# Patient Record
Sex: FEMALE | Race: WHITE | NOT HISPANIC OR LATINO | Employment: FULL TIME | URBAN - METROPOLITAN AREA
[De-identification: names, ages, dates, MRNs, and addresses within clinical notes are randomized per-mention and may not be internally consistent; named-entity substitution may affect disease eponyms.]

---

## 2017-01-25 ENCOUNTER — OFFICE VISIT (OUTPATIENT)
Dept: INTERNAL MEDICINE | Facility: CLINIC | Age: 57
End: 2017-01-25
Payer: COMMERCIAL

## 2017-01-25 VITALS
SYSTOLIC BLOOD PRESSURE: 158 MMHG | DIASTOLIC BLOOD PRESSURE: 86 MMHG | HEIGHT: 64 IN | WEIGHT: 187.19 LBS | HEART RATE: 73 BPM | BODY MASS INDEX: 31.96 KG/M2

## 2017-01-25 DIAGNOSIS — G47.00 INSOMNIA, UNSPECIFIED TYPE: Primary | ICD-10-CM

## 2017-01-25 DIAGNOSIS — F41.9 ANXIETY AND DEPRESSION: ICD-10-CM

## 2017-01-25 DIAGNOSIS — F32.A ANXIETY AND DEPRESSION: ICD-10-CM

## 2017-01-25 PROCEDURE — 99999 PR PBB SHADOW E&M-EST. PATIENT-LVL III: CPT | Mod: PBBFAC,,, | Performed by: INTERNAL MEDICINE

## 2017-01-25 PROCEDURE — 99213 OFFICE O/P EST LOW 20 MIN: CPT | Mod: S$GLB,,, | Performed by: INTERNAL MEDICINE

## 2017-01-25 PROCEDURE — 3079F DIAST BP 80-89 MM HG: CPT | Mod: S$GLB,,, | Performed by: INTERNAL MEDICINE

## 2017-01-25 PROCEDURE — 3077F SYST BP >= 140 MM HG: CPT | Mod: S$GLB,,, | Performed by: INTERNAL MEDICINE

## 2017-01-25 PROCEDURE — 1159F MED LIST DOCD IN RCRD: CPT | Mod: S$GLB,,, | Performed by: INTERNAL MEDICINE

## 2017-01-25 RX ORDER — LORAZEPAM 1 MG/1
1 TABLET ORAL NIGHTLY
Qty: 30 TABLET | Refills: 1 | Status: SHIPPED | OUTPATIENT
Start: 2017-01-25 | End: 2017-03-24 | Stop reason: SDUPTHER

## 2017-01-25 NOTE — PROGRESS NOTES
Subjective:       Patient ID: Marielena Tracy is a 56 y.o. female.    Chief Complaint: Anxiety (discuss anxiety medication)    HPI Comments: Patient here to discuss anxiety and insomnia.  56-year-old female with history of intermittent anxiety and depression who states she has had increasingly difficulty sleeping in part she believes because she has anxiety and her mind is racing at the time of trying to go to bed.  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  She says she was told that she is not personally liable for anything but she said the company assigned her an  and she says that has her anxious.  At this point she said the Ambien 5 mg is not helping with sleep.  She was looking for options.  While I do not want her on medication long-term with an important work case coming up I think it is reasonable to try a benzodiazepine medication.  We discussed risks benefits and side effects and she is willing to try it in lieu of Ambien.     Anxiety   Symptoms include nervous/anxious behavior. Patient reports no chest pain, shortness of breath or suicidal ideas.         Review of Systems   Constitutional: Negative for appetite change, chills and fever.   HENT: Negative for sore throat.    Respiratory: Negative for cough, shortness of breath and wheezing.    Cardiovascular: Negative for chest pain and leg swelling.   Gastrointestinal: Negative for abdominal pain, constipation and diarrhea.   Genitourinary: Negative for difficulty urinating.   Musculoskeletal: Negative for neck pain and neck stiffness.   Skin: Negative for rash.   Psychiatric/Behavioral: Positive for dysphoric mood and sleep disturbance. Negative for self-injury and suicidal ideas. The patient is nervous/anxious.        Objective:      Physical Exam   Constitutional: She is oriented to person, place, and time. She appears well-developed and well-nourished. No distress.   HENT:    Head: Normocephalic and atraumatic.   Mouth/Throat: No oropharyngeal exudate.   Eyes: No scleral icterus.   Neck: Normal range of motion. Neck supple.   Cardiovascular: Normal rate and regular rhythm.    No murmur heard.  Pulmonary/Chest: Effort normal and breath sounds normal. She has no wheezes.   Musculoskeletal: She exhibits no tenderness.   Neurological: She is alert and oriented to person, place, and time.   Skin: No rash noted.   Psychiatric:   Mood is somewhat anxious.  She is somewhat fidgety during the exam.  Minimally anxious about this upcoming trip and court case.  No suicidal homicidal ideation       Assessment:       1. Insomnia, unspecified type    2. Anxiety and depression        Plan:       Marielena was seen today for anxiety.    Diagnoses and all orders for this visit:    Insomnia, unspecified type    Anxiety and depression    Other orders  -     lorazepam (ATIVAN) 1 MG tablet; Take 1 tablet (1 mg total) by mouth every evening.        patient will update me in a few days as to her progress of getting more restful sleep.  Depending on how she is doing after this trip for work we will consider tapering and stopping the meds or seeing a sleep specialist

## 2017-01-25 NOTE — MR AVS SNAPSHOT
[facility]  [address]  [place] [address]  Phone: [phone]  Fax: [phone]                  Marielena Tracy   2017 1:15 PM   Office Visit    Description:  Female : 1960   Provider:  Delroy Gregory MD   Department:  [facility] Medicine           Reason for Visit     Anxiety           Diagnoses this Visit        Comments    Insomnia, unspecified type    -  Primary     Anxiety and depression                To Do List           Goals (5 Years of Data)     None       These Medications        Disp Refills Start End    lorazepam (ATIVAN) 1 MG tablet 30 tablet 1 2017     Take 1 tablet (1 mg total) by mouth every evening. - Oral    Pharmacy: [facility]/pharmacy #[id number] - [place], [place] - [address]  #: [phone]         [facility]     [facility] Nurse Care [facility] -  Assistance  Registered nurses in the [facility] provide clinical advisement, health education, appointment booking, and other advisory services.  Call for this free service at [phone].             Medications           Message regarding Medications     Verify the changes and/or additions to your medication regime listed below are the same as discussed with your clinician today.  If any of these changes or additions are incorrect, please notify your healthcare provider.        START taking these NEW medications        Refills    lorazepam (ATIVAN) 1 MG tablet 1    Sig: Take 1 tablet (1 mg total) by mouth every evening.    Class: Normal    Route: Oral      STOP taking these medications     zolpidem (AMBIEN) 5 MG Tab TAKE 1 TABLET (5 MG TOTAL) BY MOUTH EVERY EVENING.           Verify that the below list of medications is an accurate representation of the medications you are currently taking.  If none reported, the list may be blank. If incorrect, please contact your healthcare provider. Carry this list with you in case of emergency.          "  Current Medications     aspirin (ECOTRIN) 81 MG EC tablet Take 81 mg by mouth once daily.    CALCIUM CARBONATE/VITAMIN D3 (VITAMIN D-3 ORAL) Take 1,000 mg by mouth once daily.    citalopram (CELEXA) 20 MG tablet Take 1 tablet (20 mg total) by mouth once daily.    clobetasol 0.05% (TEMOVATE) 0.05 % Oint AAA bid    fish oil-omega-3 fatty acids 300-1,000 mg capsule Take 2 g by mouth once daily.    fluocinonide (LIDEX) 0.05 % external solution AAA scalp qd - bid prn pruritus    simvastatin (ZOCOR) 40 MG tablet Take 1 tablet (40 mg total) by mouth once daily.    triamcinolone acetonide 0.1% (KENALOG) 0.1 % ointment AAA bid    urea 50 % Crea Apply 1 application topically once daily. Apply to affected area    folic acid (FOLVITE) 1 MG tablet 1 po qd - do not take on same day as taking methotrexate    latanoprost 0.005 % ophthalmic solution Place 1 drop into both eyes every evening.    lorazepam (ATIVAN) 1 MG tablet Take 1 tablet (1 mg total) by mouth every evening.    methotrexate 2.5 MG Tab TAKE 6 TABLETS (15 MG TOTAL) BY MOUTH EVERY 7 DAYS.    peg-electrolyte soln (TRILYTE WITH FLAVOR PACKETS) 420 gram SolR Take 4,000 mLs by mouth as directed.           Clinical Reference Information           Vital Signs - Last Recorded  Most recent update: 1/25/2017  1:21 PM by Sonal Teran MA    BP Pulse Ht Wt BMI    (!) 158/86 73 5' 4" (1.626 m) 84.9 kg (187 lb 3.2 oz) 32.13 kg/m2      Blood Pressure          Most Recent Value    BP  (!)  158/86      Allergies as of 1/25/2017     Sulfa (Sulfonamide Antibiotics)      Immunizations Administered on Date of Encounter - 1/25/2017     None      "

## 2017-01-30 ENCOUNTER — PATIENT MESSAGE (OUTPATIENT)
Dept: INTERNAL MEDICINE | Facility: CLINIC | Age: 57
End: 2017-01-30

## 2017-02-16 ENCOUNTER — PATIENT MESSAGE (OUTPATIENT)
Dept: DERMATOLOGY | Facility: CLINIC | Age: 57
End: 2017-02-16

## 2017-03-24 RX ORDER — LORAZEPAM 1 MG/1
TABLET ORAL
Qty: 30 TABLET | Refills: 1 | Status: SHIPPED | OUTPATIENT
Start: 2017-03-24 | End: 2017-05-26 | Stop reason: SDUPTHER

## 2017-05-03 ENCOUNTER — OFFICE VISIT (OUTPATIENT)
Dept: INTERNAL MEDICINE | Facility: CLINIC | Age: 57
End: 2017-05-03
Payer: COMMERCIAL

## 2017-05-03 VITALS
HEIGHT: 64 IN | HEART RATE: 78 BPM | DIASTOLIC BLOOD PRESSURE: 84 MMHG | SYSTOLIC BLOOD PRESSURE: 120 MMHG | BODY MASS INDEX: 32.74 KG/M2 | WEIGHT: 191.81 LBS

## 2017-05-03 DIAGNOSIS — E78.5 HYPERLIPIDEMIA, UNSPECIFIED HYPERLIPIDEMIA TYPE: ICD-10-CM

## 2017-05-03 DIAGNOSIS — F41.9 ANXIETY AND DEPRESSION: ICD-10-CM

## 2017-05-03 DIAGNOSIS — Z00.00 ROUTINE PHYSICAL EXAMINATION: Primary | ICD-10-CM

## 2017-05-03 DIAGNOSIS — Z12.31 ENCOUNTER FOR SCREENING MAMMOGRAM FOR MALIGNANT NEOPLASM OF BREAST: ICD-10-CM

## 2017-05-03 DIAGNOSIS — I10 ESSENTIAL HYPERTENSION: ICD-10-CM

## 2017-05-03 DIAGNOSIS — F32.A ANXIETY AND DEPRESSION: ICD-10-CM

## 2017-05-03 PROCEDURE — 3079F DIAST BP 80-89 MM HG: CPT | Mod: S$GLB,,, | Performed by: INTERNAL MEDICINE

## 2017-05-03 PROCEDURE — 99999 PR PBB SHADOW E&M-EST. PATIENT-LVL IV: CPT | Mod: PBBFAC,,, | Performed by: INTERNAL MEDICINE

## 2017-05-03 PROCEDURE — 99396 PREV VISIT EST AGE 40-64: CPT | Mod: S$GLB,,, | Performed by: INTERNAL MEDICINE

## 2017-05-03 PROCEDURE — 3074F SYST BP LT 130 MM HG: CPT | Mod: S$GLB,,, | Performed by: INTERNAL MEDICINE

## 2017-05-03 RX ORDER — CITALOPRAM 40 MG/1
40 TABLET, FILM COATED ORAL DAILY
Qty: 30 TABLET | Refills: 12 | Status: SHIPPED | OUTPATIENT
Start: 2017-05-03 | End: 2017-07-14 | Stop reason: SDUPTHER

## 2017-05-03 NOTE — MR AVS SNAPSHOT
[facility]  [address]  [place] [address]  Phone: [phone]  Fax: [phone]                  Marielena Tracy   5/3/2017 3:00 PM   Office Visit    Description:  Female : 1960   Provider:  Delroy Gregory MD   Department:  [facility] - Internal Medicine           Reason for Visit     Annual Exam           Diagnoses this Visit        Comments    Routine physical examination    -  Primary     Hyperlipidemia, unspecified hyperlipidemia type         Essential hypertension         Anxiety and depression         Encounter for screening mammogram for malignant neoplasm of breast                To Do List           Goals (5 Years of Data)     None       These Medications        Disp Refills Start End    citalopram (CELEXA) 40 MG tablet 30 tablet 12 5/3/2017     Take 1 tablet (40 mg total) by mouth once daily. - Oral    Pharmacy: [facility]/pharmacy #[id number] - [place], [place] - [address].  #: [phone]         [facility]     [facility] Nurse Care Line -  Assistance  Unless otherwise directed by your provider, please contact [facility], our nurse care line that is available for  assistance.     Registered nurses in the [facility] Center provide: appointment scheduling, clinical advisement, health education, and other advisory services.  Call: [phone] (toll free)               Medications           Message regarding Medications     Verify the changes and/or additions to your medication regime listed below are the same as discussed with your clinician today.  If any of these changes or additions are incorrect, please notify your healthcare provider.        CHANGE how you are taking these medications     Start Taking Instead of    citalopram (CELEXA) 40 MG tablet citalopram (CELEXA) 20 MG tablet    Dosage:  Take 1 tablet (40 mg total) by mouth once daily. Dosage:  Take 1 tablet (20 mg total) by mouth once daily.    Reason for  "Change:  Reorder       STOP taking these medications     peg-electrolyte soln (TRILYTE WITH FLAVOR PACKETS) 420 gram SolR Take 4,000 mLs by mouth as directed.           Verify that the below list of medications is an accurate representation of the medications you are currently taking.  If none reported, the list may be blank. If incorrect, please contact your healthcare provider. Carry this list with you in case of emergency.           Current Medications     aspirin (ECOTRIN) 81 MG EC tablet Take 81 mg by mouth once daily.    CALCIUM CARBONATE/VITAMIN D3 (VITAMIN D-3 ORAL) Take 1,000 mg by mouth once daily.    citalopram (CELEXA) 40 MG tablet Take 1 tablet (40 mg total) by mouth once daily.    clobetasol 0.05% (TEMOVATE) 0.05 % Oint AAA bid    fish oil-omega-3 fatty acids 300-1,000 mg capsule Take 2 g by mouth once daily.    fluocinonide (LIDEX) 0.05 % external solution AAA scalp qd - bid prn pruritus    folic acid (FOLVITE) 1 MG tablet 1 po qd - do not take on same day as taking methotrexate    latanoprost 0.005 % ophthalmic solution Place 1 drop into both eyes every evening.    lorazepam (ATIVAN) 1 MG tablet TAKE 1 TABLET (1 MG TOTAL) BY MOUTH EVERY EVENING.    methotrexate 2.5 MG Tab TAKE 6 TABLETS (15 MG TOTAL) BY MOUTH EVERY 7 DAYS.    simvastatin (ZOCOR) 40 MG tablet Take 1 tablet (40 mg total) by mouth once daily.    triamcinolone acetonide 0.1% (KENALOG) 0.1 % ointment AAA bid    urea 50 % Crea Apply 1 application topically once daily. Apply to affected area           Clinical Reference Information           Your Vitals Were     BP Pulse Height Weight BMI    120/84 78 5' 4" (1.626 m) 87 kg (191 lb 12.8 oz) 32.92 kg/m2      Blood Pressure          Most Recent Value    BP  120/84      Allergies as of 5/3/2017     Sulfa (Sulfonamide Antibiotics)      Immunizations Administered on Date of Encounter - 5/3/2017     None      Orders Placed During Today's Visit     Future Labs/Procedures Expected by Expires    CBC " auto differential  5/3/2017 5/3/2018    Comprehensive metabolic panel  5/3/2017 5/3/2018    Lipid panel  5/3/2017 5/3/2018    Mammo Digital Screening Bilat with CAD  5/3/2017 7/4/2018    TSH  5/3/2017 5/3/2018    Vitamin D  5/3/2017 (Approximate) 8/1/2017      Language Assistance Services     ATTENTION: Language assistance services are available, free of charge. Please call [phone].      ATENCIÓN: Si habla darrynañol, tiene a purcell disposición servicios gratuitos de asistencia lingüística. Llame al [phone].     CHÚ Ý: N?u b?n nói Ti?ng Vi?t, có các d?ch v? h? tr? ngôn ng? mi?n phí dành cho b?n. G?i s? [phone].         [facility] complies with applicable Federal civil rights laws and does not discriminate on the basis of race, color, national origin, age, disability, or sex.
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
## 2017-05-19 ENCOUNTER — HOSPITAL ENCOUNTER (OUTPATIENT)
Dept: RADIOLOGY | Facility: HOSPITAL | Age: 57
Discharge: HOME | End: 2017-05-19
Attending: INTERNAL MEDICINE
Payer: COMMERCIAL

## 2017-05-19 DIAGNOSIS — Z12.31 ENCOUNTER FOR SCREENING MAMMOGRAM FOR MALIGNANT NEOPLASM OF BREAST: ICD-10-CM

## 2017-05-19 PROCEDURE — 77067 SCR MAMMO BI INCL CAD: CPT | Mod: 26,,, | Performed by: RADIOLOGY

## 2017-05-19 PROCEDURE — 77067 SCR MAMMO BI INCL CAD: CPT | Mod: TC

## 2017-05-24 ENCOUNTER — PATIENT MESSAGE (OUTPATIENT)
Dept: INTERNAL MEDICINE | Facility: CLINIC | Age: 57
End: 2017-05-24

## 2017-05-26 ENCOUNTER — PATIENT MESSAGE (OUTPATIENT)
Dept: INTERNAL MEDICINE | Facility: CLINIC | Age: 57
End: 2017-05-26

## 2017-05-26 RX ORDER — LORAZEPAM 1 MG/1
TABLET ORAL
Qty: 30 TABLET | Refills: 1 | Status: SHIPPED | OUTPATIENT
Start: 2017-05-26 | End: 2017-07-24 | Stop reason: SDUPTHER

## 2017-06-05 RX ORDER — SIMVASTATIN 40 MG/1
40 TABLET, FILM COATED ORAL DAILY
Qty: 90 TABLET | Refills: 1 | Status: SHIPPED | OUTPATIENT
Start: 2017-06-05 | End: 2017-12-02 | Stop reason: SDUPTHER

## 2017-07-14 RX ORDER — CITALOPRAM 40 MG/1
40 TABLET, FILM COATED ORAL DAILY
Qty: 90 TABLET | Refills: 3 | Status: SHIPPED | OUTPATIENT
Start: 2017-07-14 | End: 2018-05-28 | Stop reason: SDUPTHER

## 2017-07-24 RX ORDER — LORAZEPAM 1 MG/1
TABLET ORAL
Qty: 30 TABLET | Refills: 1 | Status: SHIPPED | OUTPATIENT
Start: 2017-07-24 | End: 2017-09-20 | Stop reason: SDUPTHER

## 2017-08-07 ENCOUNTER — PATIENT MESSAGE (OUTPATIENT)
Dept: INTERNAL MEDICINE | Facility: CLINIC | Age: 57
End: 2017-08-07

## 2017-09-20 RX ORDER — LORAZEPAM 1 MG/1
TABLET ORAL
Qty: 30 TABLET | Refills: 1 | Status: SHIPPED | OUTPATIENT
Start: 2017-09-20 | End: 2017-11-20 | Stop reason: SDUPTHER

## 2017-11-20 RX ORDER — LORAZEPAM 1 MG/1
TABLET ORAL
Qty: 30 TABLET | Refills: 1 | Status: SHIPPED | OUTPATIENT
Start: 2017-11-20 | End: 2018-01-20 | Stop reason: SDUPTHER

## 2017-12-03 RX ORDER — SIMVASTATIN 40 MG/1
40 TABLET, FILM COATED ORAL DAILY
Qty: 90 TABLET | Refills: 1 | Status: SHIPPED | OUTPATIENT
Start: 2017-12-03 | End: 2018-05-27 | Stop reason: SDUPTHER

## 2018-01-20 RX ORDER — LORAZEPAM 1 MG/1
TABLET ORAL
Qty: 30 TABLET | Refills: 1 | Status: SHIPPED | OUTPATIENT
Start: 2018-01-20 | End: 2018-03-21 | Stop reason: SDUPTHER

## 2018-03-22 RX ORDER — LORAZEPAM 1 MG/1
TABLET ORAL
Qty: 30 TABLET | Refills: 0 | Status: SHIPPED | OUTPATIENT
Start: 2018-03-22 | End: 2018-04-19 | Stop reason: SDUPTHER

## 2018-04-19 RX ORDER — LORAZEPAM 1 MG/1
TABLET ORAL
Qty: 30 TABLET | Refills: 0 | Status: SHIPPED | OUTPATIENT
Start: 2018-04-19 | End: 2018-05-28 | Stop reason: SDUPTHER

## 2018-05-16 ENCOUNTER — PATIENT MESSAGE (OUTPATIENT)
Dept: INTERNAL MEDICINE | Facility: CLINIC | Age: 58
End: 2018-05-16

## 2018-05-16 RX ORDER — LORAZEPAM 1 MG/1
TABLET ORAL
Qty: 30 TABLET | Refills: 0 | OUTPATIENT
Start: 2018-05-16

## 2018-05-16 NOTE — TELEPHONE ENCOUNTER
Refill of requested medication(s) is not appropriate. It has been a year since she has been seen.  Please assist patient with making an appointment.

## 2018-05-27 RX ORDER — SIMVASTATIN 40 MG/1
40 TABLET, FILM COATED ORAL DAILY
Qty: 90 TABLET | Refills: 1 | Status: SHIPPED | OUTPATIENT
Start: 2018-05-27 | End: 2018-05-28 | Stop reason: SDUPTHER

## 2018-05-28 ENCOUNTER — PATIENT MESSAGE (OUTPATIENT)
Dept: INTERNAL MEDICINE | Facility: CLINIC | Age: 58
End: 2018-05-28

## 2018-05-28 ENCOUNTER — HOSPITAL ENCOUNTER (OUTPATIENT)
Dept: RADIOLOGY | Facility: HOSPITAL | Age: 58
Discharge: HOME | End: 2018-05-28
Attending: INTERNAL MEDICINE
Payer: COMMERCIAL

## 2018-05-28 ENCOUNTER — OFFICE VISIT (OUTPATIENT)
Dept: INTERNAL MEDICINE | Facility: CLINIC | Age: 58
End: 2018-05-28
Payer: COMMERCIAL

## 2018-05-28 VITALS
OXYGEN SATURATION: 96 % | DIASTOLIC BLOOD PRESSURE: 71 MMHG | BODY MASS INDEX: 32.9 KG/M2 | HEIGHT: 64 IN | HEART RATE: 85 BPM | WEIGHT: 192.69 LBS | SYSTOLIC BLOOD PRESSURE: 128 MMHG

## 2018-05-28 VITALS — HEIGHT: 64 IN | WEIGHT: 192 LBS | BODY MASS INDEX: 32.78 KG/M2

## 2018-05-28 DIAGNOSIS — Z12.31 ENCOUNTER FOR SCREENING MAMMOGRAM FOR MALIGNANT NEOPLASM OF BREAST: ICD-10-CM

## 2018-05-28 DIAGNOSIS — F41.9 ANXIETY AND DEPRESSION: ICD-10-CM

## 2018-05-28 DIAGNOSIS — I10 ESSENTIAL HYPERTENSION: ICD-10-CM

## 2018-05-28 DIAGNOSIS — Z00.00 ROUTINE PHYSICAL EXAMINATION: Primary | ICD-10-CM

## 2018-05-28 DIAGNOSIS — E78.5 HYPERLIPIDEMIA, UNSPECIFIED HYPERLIPIDEMIA TYPE: ICD-10-CM

## 2018-05-28 DIAGNOSIS — H40.9 GLAUCOMA, UNSPECIFIED GLAUCOMA TYPE, UNSPECIFIED LATERALITY: ICD-10-CM

## 2018-05-28 DIAGNOSIS — F32.A ANXIETY AND DEPRESSION: ICD-10-CM

## 2018-05-28 PROCEDURE — 77067 SCR MAMMO BI INCL CAD: CPT | Mod: 26,,, | Performed by: RADIOLOGY

## 2018-05-28 PROCEDURE — 77067 SCR MAMMO BI INCL CAD: CPT | Mod: TC

## 2018-05-28 PROCEDURE — 99999 PR PBB SHADOW E&M-EST. PATIENT-LVL III: CPT | Mod: PBBFAC,,, | Performed by: INTERNAL MEDICINE

## 2018-05-28 PROCEDURE — 99396 PREV VISIT EST AGE 40-64: CPT | Mod: S$GLB,,, | Performed by: INTERNAL MEDICINE

## 2018-05-28 PROCEDURE — 3078F DIAST BP <80 MM HG: CPT | Mod: CPTII,S$GLB,, | Performed by: INTERNAL MEDICINE

## 2018-05-28 PROCEDURE — 3074F SYST BP LT 130 MM HG: CPT | Mod: CPTII,S$GLB,, | Performed by: INTERNAL MEDICINE

## 2018-05-28 RX ORDER — LORAZEPAM 1 MG/1
TABLET ORAL
Qty: 30 TABLET | Refills: 5 | Status: SHIPPED | OUTPATIENT
Start: 2018-05-28 | End: 2018-11-13 | Stop reason: SDUPTHER

## 2018-05-28 RX ORDER — SIMVASTATIN 40 MG/1
40 TABLET, FILM COATED ORAL DAILY
Qty: 90 TABLET | Refills: 4 | Status: SHIPPED | OUTPATIENT
Start: 2018-05-28 | End: 2018-11-15 | Stop reason: SDUPTHER

## 2018-05-28 RX ORDER — CITALOPRAM 40 MG/1
40 TABLET, FILM COATED ORAL DAILY
Qty: 90 TABLET | Refills: 4 | Status: SHIPPED | OUTPATIENT
Start: 2018-05-28 | End: 2019-05-10 | Stop reason: SDUPTHER

## 2018-05-28 NOTE — PROGRESS NOTES
Subjective:       Patient ID: Marielena Tracy is a 57 y.o. female.    Chief Complaint: Medication Refill    HPI:  Patient here exam.  She is here follow-up dyslipidemia and borderline D. She make a vision check follow-up and gyn.  She is up-to-date with other screening.  We will assist labs and mammogram we discussed habit-forming nature benzodiazepine medications.  She understanding has not escalated the dose.  She states skin symptoms seemed to stem from anxiety and when well controlled those are under control.  She denies any changes in family history.      Medication Refill   Associated symptoms include a rash. Pertinent negatives include no abdominal pain, arthralgias, chest pain, chills, coughing, fatigue, fever, headaches, joint swelling, nausea, neck pain, sore throat, vomiting or weakness.     Review of Systems   Constitutional: Negative for activity change, chills, fatigue, fever and unexpected weight change.   HENT: Negative for hearing loss, nosebleeds, rhinorrhea, sinus pain, sore throat and trouble swallowing.    Eyes: Negative for pain, discharge and visual disturbance.   Respiratory: Negative for apnea, cough, chest tightness, shortness of breath and wheezing.    Cardiovascular: Negative for chest pain, palpitations and leg swelling.   Gastrointestinal: Negative for abdominal pain, blood in stool, constipation, diarrhea, nausea and vomiting.   Endocrine: Negative for polydipsia and polyuria.   Genitourinary: Negative for difficulty urinating, dysuria, frequency, hematuria and menstrual problem.   Musculoskeletal: Negative for arthralgias, joint swelling, neck pain and neck stiffness.   Skin: Positive for rash. Negative for wound.   Neurological: Negative for dizziness, weakness and headaches.   Hematological: Negative for adenopathy.   Psychiatric/Behavioral: Negative for confusion and dysphoric mood. The patient is nervous/anxious.        Objective:      Physical Exam   Constitutional: She is  oriented to person, place, and time. She appears well-developed and well-nourished.   Overweight female     HENT:   Head: Normocephalic and atraumatic.   Right Ear: Tympanic membrane, external ear and ear canal normal.   Left Ear: Tympanic membrane, external ear and ear canal normal.   Nose: Nose normal.   Mouth/Throat: Oropharynx is clear and moist and mucous membranes are normal. No oropharyngeal exudate.   Eyes: Conjunctivae and EOM are normal. Pupils are equal, round, and reactive to light. Right eye exhibits no discharge. Left eye exhibits no discharge.   Neck: Normal range of motion. Neck supple. No JVD present. No thyromegaly present.   Cardiovascular: Normal rate, regular rhythm, normal heart sounds and intact distal pulses.    No murmur heard.  Pulmonary/Chest: Effort normal and breath sounds normal. No respiratory distress. She has no wheezes. She has no rales.   Abdominal: Soft. Bowel sounds are normal. She exhibits no mass. There is no tenderness.   Musculoskeletal: Normal range of motion. She exhibits no edema or tenderness.   Lymphadenopathy:     She has no cervical adenopathy.        Right: No supraclavicular adenopathy present.        Left: No supraclavicular adenopathy present.   Neurological: She is alert and oriented to person, place, and time. She has normal reflexes. No cranial nerve deficit.   Skin: No rash noted.   Psychiatric: She has a normal mood and affect. She does not exhibit a depressed mood.   Vitals reviewed.      Assessment:       1. Routine physical examination    2. Essential hypertension    3. Hyperlipidemia, unspecified hyperlipidemia type    4. Glaucoma, unspecified glaucoma type, unspecified laterality    5. Encounter for screening mammogram for malignant neoplasm of breast    6. Anxiety and depression        Plan:       Marielena was seen today for medication refill.    Diagnoses and all orders for this visit:    Routine physical examination  -     Lipid panel; Future  -      Comprehensive metabolic panel; Future  -     Vitamin D; Future    Essential hypertension  -     Lipid panel; Future  -     Comprehensive metabolic panel; Future  -     Vitamin D; Future    Hyperlipidemia, unspecified hyperlipidemia type  -     Lipid panel; Future  -     Comprehensive metabolic panel; Future  -     Vitamin D; Future    Glaucoma, unspecified glaucoma type, unspecified laterality  -     Lipid panel; Future  -     Comprehensive metabolic panel; Future  -     Vitamin D; Future    Encounter for screening mammogram for malignant neoplasm of breast  -     Mammo Digital Screening Bilat with CAD; Future    Anxiety and depression    Other orders  -     LORazepam (ATIVAN) 1 MG tablet; TAKE 1 TABLET (1 MG TOTAL) BY MOUTH EVERY EVENING.  -     citalopram (CELEXA) 40 MG tablet; Take 1 tablet (40 mg total) by mouth once daily.  -     simvastatin (ZOCOR) 40 MG tablet; Take 1 tablet (40 mg total) by mouth once daily.

## 2018-08-06 ENCOUNTER — PATIENT MESSAGE (OUTPATIENT)
Dept: INTERNAL MEDICINE | Facility: CLINIC | Age: 58
End: 2018-08-06

## 2018-08-14 ENCOUNTER — TELEPHONE (OUTPATIENT)
Dept: OBSTETRICS AND GYNECOLOGY | Facility: CLINIC | Age: 58
End: 2018-08-14

## 2018-08-14 ENCOUNTER — OFFICE VISIT (OUTPATIENT)
Dept: OBSTETRICS AND GYNECOLOGY | Facility: CLINIC | Age: 58
End: 2018-08-14
Payer: COMMERCIAL

## 2018-08-14 VITALS
BODY MASS INDEX: 32.52 KG/M2 | WEIGHT: 190.5 LBS | SYSTOLIC BLOOD PRESSURE: 126 MMHG | DIASTOLIC BLOOD PRESSURE: 80 MMHG | HEIGHT: 64 IN

## 2018-08-14 DIAGNOSIS — N95.2 VAGINAL ATROPHY: ICD-10-CM

## 2018-08-14 DIAGNOSIS — Z78.0 POSTMENOPAUSE: ICD-10-CM

## 2018-08-14 DIAGNOSIS — N94.10 DYSPAREUNIA IN FEMALE: ICD-10-CM

## 2018-08-14 DIAGNOSIS — Z01.419 WELL WOMAN EXAM WITH ROUTINE GYNECOLOGICAL EXAM: Primary | ICD-10-CM

## 2018-08-14 PROCEDURE — 99386 PREV VISIT NEW AGE 40-64: CPT | Mod: S$GLB,,, | Performed by: NURSE PRACTITIONER

## 2018-08-14 PROCEDURE — 99999 PR PBB SHADOW E&M-EST. PATIENT-LVL III: CPT | Mod: PBBFAC,,, | Performed by: NURSE PRACTITIONER

## 2018-08-14 PROCEDURE — 88175 CYTOPATH C/V AUTO FLUID REDO: CPT

## 2018-08-14 PROCEDURE — 3074F SYST BP LT 130 MM HG: CPT | Mod: CPTII,S$GLB,, | Performed by: NURSE PRACTITIONER

## 2018-08-14 PROCEDURE — 3079F DIAST BP 80-89 MM HG: CPT | Mod: CPTII,S$GLB,, | Performed by: NURSE PRACTITIONER

## 2018-08-29 ENCOUNTER — OFFICE VISIT (OUTPATIENT)
Dept: OPTOMETRY | Facility: CLINIC | Age: 58
End: 2018-08-29
Payer: COMMERCIAL

## 2018-08-29 DIAGNOSIS — H52.4 PRESBYOPIA OF BOTH EYES: ICD-10-CM

## 2018-08-29 DIAGNOSIS — H52.223 REGULAR ASTIGMATISM OF BOTH EYES: ICD-10-CM

## 2018-08-29 DIAGNOSIS — H40.003 GLAUCOMA SUSPECT OF BOTH EYES: Primary | ICD-10-CM

## 2018-08-29 PROCEDURE — 92004 COMPRE OPH EXAM NEW PT 1/>: CPT | Mod: S$GLB,,, | Performed by: OPTOMETRIST

## 2018-08-29 PROCEDURE — 99999 PR PBB SHADOW E&M-EST. PATIENT-LVL III: CPT | Mod: PBBFAC,,, | Performed by: OPTOMETRIST

## 2018-08-29 PROCEDURE — 92015 DETERMINE REFRACTIVE STATE: CPT | Mod: S$GLB,,, | Performed by: OPTOMETRIST

## 2018-08-29 NOTE — PATIENT INSTRUCTIONS
Prior diagnosis of low tension glaucoma in the right eye and low-tension glaucoma suspect in the left eye.  No evidence overtly glaucomatous visual field defect in either eye, per repeat visual field test done in 2015.   Question prior diagnosis of low tension glaucoma in the right eye.    Ms. Tracy was using eyedrops for IOP control in each eye, but discontinued use approximately two years ago.  IOP today well within normal range in each eye today (15 mm Hg) in each eye, without the use of drops for IOP control/reduction.  C/D ratio greater than average in each eye, but appears stablish in each eye.    Remain off drops for IOP control, at least temporarily.    Assistant to call to schedule repeat Johansen Visual Field (HVF) 24-2 LULÚ Standard or OCT retinal nerve fiber layer (RNFL) thickness analysis, and follow-up appointment with me (Dr. Milligan) on the same date.      Simple hyperopic astigmatism in each eye, with satisfactory best-corrected VA in each eye.  Presbyopia consistent with age.   New spectacle lens Rx issued for use as desired.     Repeat refraction in 12 - 18 months.

## 2018-08-29 NOTE — PROGRESS NOTES
HPI     Concerns About Ocular Health      Additional comments: General eye examination and refraction.  No longer using drops for IOP control.  Had been using Lumigan drops in   both eyes every evening, but has stopped using two years ago.               Comments     DLS:  2/16/15    Pt here for check of ocular health.  Pt states a slight decrease in VA OU   at near.  Pt states she needs a new Rx for eyeglasses.     Patient's age: 57 y.o.  Occupation: Clerical work  Approximate date of last eye examination:  2/16/15  Name of last eye doctor seen: Dr Milligan  City/State: [place]  Wears glasses? yes     If yes, wears  Full-time or part-time?  Full time  Present glasses are: Bifocal, SV Distance, SV Reading?  bifocal  Approximate age of present glasses:  3 years   Got new glasses following last exam, or subsequently?:  Following last   exam   Any problem with VA with glasses?  Yes  Wears CLs?:  no                  Headaches?  no  Eye pain/discomfort?  no                                                                                     Flashes?  no  Floaters?  no  Diplopia/Double vision?  no  Patient's Ocular History:         Any eye surgeries? no         Any eye injury?  no         Any treatment for eye disease?  glaucoma  Family history of eye disease?  Yes, maternal uncle-glaucoma  Significant patient medical history:         1. Diabetes?  no       If yes, IDDM or NIDDM? n/a   2. HBP?  no              3. Other (describe):  High cholesterol   ! OTC eyedrops currently using:  OTC drops   ! Prescription eye meds currently using:  Not using anything at this   time-ran out of Lumigan   ! Any history of allergy/adverse reaction to any eye meds used   previously?  no    ! Any history of allergy/adverse reaction to eyedrops used during prior   eye exam(s)? no    ! Any history of allergy/adverse reaction to Novacaine or similar meds?   no   ! Any history of allergy/adverse reaction to Epinephrine or similar meds?  "  no    ! Patient okay with use of anesthetic eyedrops to check eye pressure?    yes        ! Patient okay with use of eyedrops to dilate pupils today?  yes   !  Allergies/Medications/Medical History/Family History reviewed today?    yes      PD =   60/57  Desired reading distance =  14"                                                                      Last edited by Robert Milligan, OD on 8/29/2018  4:02 PM. (History)            Assessment /Plan     For exam results, see Encounter Report.    1. Glaucoma suspect of both eyes  Johansen Visual Field - OU - Extended - Both Eyes    OCT, Optic Nerve - OU - Both Eyes   2. Regular astigmatism of both eyes     3. Presbyopia of both eyes                  Prior diagnosis of low tension glaucoma in the right eye and low-tension glaucoma suspect in the left eye.  No evidence overtly glaucomatous visual field defect in either eye, per repeat visual field test done in 2015.   Question prior diagnosis of low tension glaucoma in the right eye.    Ms. Tracy was using eyedrops for IOP control in each eye, but discontinued use approximately two years ago.  IOP today well within normal range in each eye today (15 mm Hg) in each eye, without the use of drops for IOP control/reduction.  C/D ratio greater than average in each eye, but appears stablish in each eye.    Remain off drops for IOP control, at least temporarily.    Assistant to call to schedule repeat Johansen Visual Field (HVF) 24-2 LULÚ Standard or OCT retinal nerve fiber layer (RNFL) thickness analysis, and follow-up appointment with me (Dr. Milligan) on the same date.    Simple hyperopic astigmatism in each eye, with satisfactory best-corrected VA in each eye.  Presbyopia consistent with age.   New spectacle lens Rx issued for use as desired.     Repeat refraction in 12 - 18 months.     "

## 2018-09-27 ENCOUNTER — OFFICE VISIT (OUTPATIENT)
Dept: OPTOMETRY | Facility: CLINIC | Age: 58
End: 2018-09-27
Payer: COMMERCIAL

## 2018-09-27 ENCOUNTER — CLINICAL SUPPORT (OUTPATIENT)
Dept: OPHTHALMOLOGY | Facility: CLINIC | Age: 58
End: 2018-09-27
Payer: COMMERCIAL

## 2018-09-27 DIAGNOSIS — H40.003 GLAUCOMA SUSPECT OF BOTH EYES: Primary | ICD-10-CM

## 2018-09-27 DIAGNOSIS — H40.003 GLAUCOMA SUSPECT OF BOTH EYES: ICD-10-CM

## 2018-09-27 PROCEDURE — 92133 CPTRZD OPH DX IMG PST SGM ON: CPT | Mod: S$GLB,,, | Performed by: OPTOMETRIST

## 2018-09-27 PROCEDURE — 92012 INTRM OPH EXAM EST PATIENT: CPT | Mod: S$GLB,,, | Performed by: OPTOMETRIST

## 2018-09-27 PROCEDURE — 92083 EXTENDED VISUAL FIELD XM: CPT | Mod: S$GLB,,, | Performed by: OPTOMETRIST

## 2018-09-27 PROCEDURE — 99999 PR PBB SHADOW E&M-EST. PATIENT-LVL III: CPT | Mod: PBBFAC,,, | Performed by: OPTOMETRIST

## 2018-09-27 NOTE — PROGRESS NOTES
"HPI     Patient in for progress check. Pt returns for glaucoma follow up. Pt   denies any noticeable changes since last visit.    Being followed for (diagnosis):       Date last seen:  08/29/18    Doctor last seen:      Prescribed eye medications(s) using:      OTC eye medication(s) using:      Signs/symptoms of condition resolved/better/stable/worse?: intermittent   mucus in the morning, no flashes.    Allergies/Medications reviewed today Sulf           Last edited by Hany Barkley MA on 9/27/2018  8:50 AM. (History)            Assessment /Plan     For exam results, see Encounter Report.    1. Glaucoma suspect of both eyes                    Prior diagnosis of low tension glaucoma in the right eye and low-tension glaucoma suspect in the left eye.  No evidence overtly glaucomatous visual field defect in either eye, per repeat visual field test done in 2015.   Question prior diagnosis of low tension glaucoma in the right eye.     Ms. Tracy was using eyedrops for IOP control in each eye, but discontinued use approximately two years ago.  IOP today remains well within normal range in each eye today (15 mm Hg in the right eye and 13 mm Hg in the left eye) without the use of drops for IOP control/reduction.  C/D ratio greater than average in each eye, but appears stablish in each eye,compared to previous descriptio/photos.    Repeat HVF test done today.  Focal defect superotemporal quadrant of the right eye, suggestive of enlarged blind spot, but no other overtly glaucomatous visual field defect noted.  Visual field of the left eye appears normal.  Discussed with Ms. Tracy.     Remain off drops for IOP control, and continue to monitor regularly - at least annually.     Ms. Tracy reports awareness of transient "film" in vision of the right eye.  Dilated fundus exam reveals presence of minimal vitreous floaters in the right eye, but no other problem noted.  Discussed and reassured.    Repeat general eye " exam in one year.

## 2018-09-27 NOTE — PATIENT INSTRUCTIONS
"Prior diagnosis of low tension glaucoma in the right eye and low-tension glaucoma suspect in the left eye.  No evidence overtly glaucomatous visual field defect in either eye, per repeat visual field test done in 2015.   Question prior diagnosis of low tension glaucoma in the right eye.     Ms. Tracy was using eyedrops for IOP control in each eye, but discontinued use approximately two years ago.  IOP today remains well within normal range in each eye today (15 mm Hg in the right eye and 13 mm Hg in the left eye) without the use of drops for IOP control/reduction.  C/D ratio greater than average in each eye, but appears stablish in each eye,compared to previous descriptio/photos.    Repeat HVF test done today.  Focal defect superotemporal quadrant of the right eye, suggestive of enlarged blind spot, but no other overtly glaucomatous visual field defect noted.  Visual field of the left eye appears normal.  Discussed with Ms. Tracy.     Remain off drops for IOP control, and continue to monitor regularly - at least annually.     Ms. Tracy reports awareness of transient "film" in vision of the right eye.  Dilated fundus exam reveals presence of minimal vitreous floaters in the right eye, but no other problem noted.  Discussed and reassured.    Repeat general eye exam in one year.   "

## 2018-11-13 ENCOUNTER — OFFICE VISIT (OUTPATIENT)
Dept: INTERNAL MEDICINE | Facility: CLINIC | Age: 58
End: 2018-11-13
Payer: COMMERCIAL

## 2018-11-13 VITALS
WEIGHT: 195.31 LBS | OXYGEN SATURATION: 98 % | HEART RATE: 70 BPM | HEIGHT: 64 IN | SYSTOLIC BLOOD PRESSURE: 138 MMHG | DIASTOLIC BLOOD PRESSURE: 84 MMHG | BODY MASS INDEX: 33.34 KG/M2

## 2018-11-13 DIAGNOSIS — I10 ESSENTIAL HYPERTENSION: ICD-10-CM

## 2018-11-13 DIAGNOSIS — E78.5 HYPERLIPIDEMIA, UNSPECIFIED HYPERLIPIDEMIA TYPE: ICD-10-CM

## 2018-11-13 DIAGNOSIS — F41.9 ANXIETY AND DEPRESSION: Primary | ICD-10-CM

## 2018-11-13 DIAGNOSIS — F32.A ANXIETY AND DEPRESSION: Primary | ICD-10-CM

## 2018-11-13 PROCEDURE — 90662 IIV NO PRSV INCREASED AG IM: CPT | Mod: S$GLB,,, | Performed by: INTERNAL MEDICINE

## 2018-11-13 PROCEDURE — 90471 IMMUNIZATION ADMIN: CPT | Mod: S$GLB,,, | Performed by: INTERNAL MEDICINE

## 2018-11-13 PROCEDURE — 3075F SYST BP GE 130 - 139MM HG: CPT | Mod: CPTII,S$GLB,, | Performed by: INTERNAL MEDICINE

## 2018-11-13 PROCEDURE — 3008F BODY MASS INDEX DOCD: CPT | Mod: CPTII,S$GLB,, | Performed by: INTERNAL MEDICINE

## 2018-11-13 PROCEDURE — 99214 OFFICE O/P EST MOD 30 MIN: CPT | Mod: 25,S$GLB,, | Performed by: INTERNAL MEDICINE

## 2018-11-13 PROCEDURE — 3079F DIAST BP 80-89 MM HG: CPT | Mod: CPTII,S$GLB,, | Performed by: INTERNAL MEDICINE

## 2018-11-13 PROCEDURE — 99999 PR PBB SHADOW E&M-EST. PATIENT-LVL IV: CPT | Mod: PBBFAC,,, | Performed by: INTERNAL MEDICINE

## 2018-11-13 RX ORDER — LORAZEPAM 1 MG/1
TABLET ORAL
Qty: 30 TABLET | Refills: 5 | Status: SHIPPED | OUTPATIENT
Start: 2018-11-13 | End: 2019-05-10 | Stop reason: SDUPTHER

## 2018-11-13 NOTE — PROGRESS NOTES
Subjective:       Patient ID: Marielena Tracy is a 58 y.o. female.    Chief Complaint: Medication Refill    HPI:  Patient comes in for interval follow-up of anxiety with medication refill lorazepam and she would like a flu shot.  Overall she is doing very well.  Mood is good.  Full of energy.  She is trying to lose weight.  She denies any active or acute complaints or problems with side effects.  NO Chest pain shortness of breath fevers or chills.      Review of Systems   Constitutional: Negative for appetite change, chills and fever.   HENT: Negative for nosebleeds, sinus pain and sore throat.    Eyes: Negative for visual disturbance.   Respiratory: Negative for cough, shortness of breath and wheezing.    Cardiovascular: Negative for chest pain and leg swelling.   Gastrointestinal: Negative for abdominal pain, constipation and diarrhea.   Genitourinary: Negative for difficulty urinating and hematuria.   Musculoskeletal: Negative for neck pain and neck stiffness.   Skin: Negative for pallor and rash.   Neurological: Negative for headaches.   Psychiatric/Behavioral: Positive for dysphoric mood ( improved) and sleep disturbance (Stable). Negative for suicidal ideas. The patient is nervous/anxious ( stable).        Objective:      Physical Exam   Constitutional: She is oriented to person, place, and time. She appears well-developed and well-nourished. No distress.   HENT:   Head: Normocephalic and atraumatic.   Mouth/Throat: Oropharynx is clear and moist. No oropharyngeal exudate.   Eyes: Conjunctivae are normal. Pupils are equal, round, and reactive to light. No scleral icterus.   Neck: Normal range of motion. Neck supple.   Cardiovascular: Normal rate and regular rhythm.   No murmur heard.  Pulmonary/Chest: Effort normal and breath sounds normal. She has no wheezes.   Abdominal: Soft. Bowel sounds are normal.   Musculoskeletal: She exhibits no tenderness.   Neurological: She is alert and oriented to person,  place, and time.   Skin: No rash noted.   Psychiatric: She has a normal mood and affect. Her behavior is normal. Thought content normal.   Vitals reviewed.      Assessment:       1. Anxiety and depression    2. Essential hypertension    3. Hyperlipidemia, unspecified hyperlipidemia type        Plan:       Marielena was seen today for medication refill.    Diagnoses and all orders for this visit:    Anxiety and depression    Essential hypertension    Hyperlipidemia, unspecified hyperlipidemia type    Other orders  -     LORazepam (ATIVAN) 1 MG tablet; TAKE 1 TABLET (1 MG TOTAL) BY MOUTH EVERY EVENING.  -     Influenza - High Dose (65+) (PF) (IM)

## 2018-11-15 RX ORDER — SIMVASTATIN 40 MG/1
40 TABLET, FILM COATED ORAL DAILY
Qty: 90 TABLET | Refills: 4 | Status: SHIPPED | OUTPATIENT
Start: 2018-11-15 | End: 2019-05-10 | Stop reason: SDUPTHER

## 2018-11-15 NOTE — TELEPHONE ENCOUNTER
----- Message from Jas Paz sent at 11/15/2018 10:19 AM CST -----  Contact: [facility]/ [place] [phone]  Is this a refill or new RX:  Refill    RX name and strength: simvastatin (ZOCOR) 40 MG tablet    Is this a 30 day or 90 day RX:  90      Pharmacy name and phone # [facility]/pharmacy #[id number] - [place] - [address] [phone] (Phone) [phone] (Fax)

## 2019-04-15 ENCOUNTER — PATIENT MESSAGE (OUTPATIENT)
Dept: INTERNAL MEDICINE | Facility: CLINIC | Age: 59
End: 2019-04-15

## 2019-04-15 DIAGNOSIS — M79.671 RIGHT FOOT PAIN: Primary | ICD-10-CM

## 2019-04-30 ENCOUNTER — HOSPITAL ENCOUNTER (OUTPATIENT)
Dept: RADIOLOGY | Facility: HOSPITAL | Age: 59
Discharge: HOME | End: 2019-04-30
Attending: PODIATRIST
Payer: COMMERCIAL

## 2019-04-30 ENCOUNTER — OFFICE VISIT (OUTPATIENT)
Dept: PODIATRY | Facility: CLINIC | Age: 59
End: 2019-04-30
Payer: COMMERCIAL

## 2019-04-30 VITALS
DIASTOLIC BLOOD PRESSURE: 88 MMHG | WEIGHT: 195 LBS | HEIGHT: 64 IN | BODY MASS INDEX: 33.29 KG/M2 | HEART RATE: 74 BPM | SYSTOLIC BLOOD PRESSURE: 150 MMHG

## 2019-04-30 DIAGNOSIS — Q66.70 CAVUS DEFORMITY: ICD-10-CM

## 2019-04-30 DIAGNOSIS — M79.671 RIGHT FOOT PAIN: Primary | ICD-10-CM

## 2019-04-30 DIAGNOSIS — M79.671 RIGHT FOOT PAIN: ICD-10-CM

## 2019-04-30 PROCEDURE — 99999 PR PBB SHADOW E&M-EST. PATIENT-LVL III: ICD-10-PCS | Mod: PBBFAC,,, | Performed by: PODIATRIST

## 2019-04-30 PROCEDURE — 99203 PR OFFICE/OUTPT VISIT, NEW, LEVL III, 30-44 MIN: ICD-10-PCS | Mod: S$GLB,,, | Performed by: PODIATRIST

## 2019-04-30 PROCEDURE — 3008F PR BODY MASS INDEX (BMI) DOCUMENTED: ICD-10-PCS | Mod: CPTII,S$GLB,, | Performed by: PODIATRIST

## 2019-04-30 PROCEDURE — 73630 XR FOOT COMPLETE 3 VIEW RIGHT: ICD-10-PCS | Mod: 26,RT,, | Performed by: RADIOLOGY

## 2019-04-30 PROCEDURE — 3079F DIAST BP 80-89 MM HG: CPT | Mod: CPTII,S$GLB,, | Performed by: PODIATRIST

## 2019-04-30 PROCEDURE — 99203 OFFICE O/P NEW LOW 30 MIN: CPT | Mod: S$GLB,,, | Performed by: PODIATRIST

## 2019-04-30 PROCEDURE — 3079F PR MOST RECENT DIASTOLIC BLOOD PRESSURE 80-89 MM HG: ICD-10-PCS | Mod: CPTII,S$GLB,, | Performed by: PODIATRIST

## 2019-04-30 PROCEDURE — 3077F SYST BP >= 140 MM HG: CPT | Mod: CPTII,S$GLB,, | Performed by: PODIATRIST

## 2019-04-30 PROCEDURE — 73630 X-RAY EXAM OF FOOT: CPT | Mod: TC,RT

## 2019-04-30 PROCEDURE — 3077F PR MOST RECENT SYSTOLIC BLOOD PRESSURE >= 140 MM HG: ICD-10-PCS | Mod: CPTII,S$GLB,, | Performed by: PODIATRIST

## 2019-04-30 PROCEDURE — 99999 PR PBB SHADOW E&M-EST. PATIENT-LVL III: CPT | Mod: PBBFAC,,, | Performed by: PODIATRIST

## 2019-04-30 PROCEDURE — 73630 X-RAY EXAM OF FOOT: CPT | Mod: 26,RT,, | Performed by: RADIOLOGY

## 2019-04-30 PROCEDURE — 3008F BODY MASS INDEX DOCD: CPT | Mod: CPTII,S$GLB,, | Performed by: PODIATRIST

## 2019-05-07 NOTE — PROGRESS NOTES
Subjective:      Patient ID: Marielena Tracy is a 58 y.o. female.    Chief Complaint: Foot Pain (right foot )    Marielena is a 58 y.o. female who presents to the podiatry clinic  with complaint of  right foot pain. Onset of the symptoms was several months ago. Precipitating event: none known. Current symptoms include: ability to bear weight, but with some pain. Aggravating factors: any weight bearing. Symptoms have progressed to a point and plateaued. Patient has had no prior foot problems. Evaluation to date: none. Treatment to date: none. Patients rates pain 5/10 on pain scale.        Review of Systems   Constitution: Negative for chills, fever and malaise/fatigue.   HENT: Negative for hearing loss.    Cardiovascular: Negative for claudication.   Respiratory: Negative for shortness of breath.    Skin: Negative for flushing and rash.   Musculoskeletal: Positive for joint pain. Negative for myalgias.   Neurological: Negative for loss of balance, numbness, paresthesias and sensory change.   Psychiatric/Behavioral: Negative for altered mental status.           Objective:      Physical Exam   Cardiovascular:   Pulses:       Dorsalis pedis pulses are 2+ on the right side, and 2+ on the left side.        Posterior tibial pulses are 2+ on the right side, and 2+ on the left side.   No edema noted to b/L LEs   Musculoskeletal:   Muscle strength is 5/5 in all groups bilaterally.  Increased height of medial arches noted b/L  POP to midfoot and dorsal MPJ, right  Hammertoes noted, digits 2-5 b/L    with adductovarus rotation of b/L fifth digit.       Feet:   Right Foot:   Protective Sensation: 5 sites tested. 5 sites sensed.   Left Foot:   Protective Sensation: 5 sites tested. 5 sites sensed.   Neurological:   Intact gross sensation noted to b/L LEs   Skin:   Normal skin tugor noted.   No open lesion noted b/L  Skin temp is warm to warm from proximal to distal b/L.  Webspaces clean, dry, and intact  Nails x10 short              Assessment:       Encounter Diagnoses   Name Primary?    Right foot pain Yes    Cavus deformity          Plan:       Marielena was seen today for foot pain.    Diagnoses and all orders for this visit:    Right foot pain  -     X-Ray Foot Complete 3 view Right; Future  -     ORTHOTIC DEVICE (DME)    Cavus deformity  -     ORTHOTIC DEVICE (DME)      I counseled the patient on her conditions, their implications and medical management.      X-rays taken and reviewed with pt, no fractures or dislocations noted.   Rx custom orthotics  Pt advised on RICE and OTC NSAIDs for associated pain.     Pt advised that her pain was likely due to her cavus foot type  Shoe modification advised for the pt. Pt was advised to obtain shoes will accommodate foot deformities.     Call or return to clinic prn if these symptoms worsen or fail to improve as anticipated.    .

## 2019-05-10 ENCOUNTER — LAB VISIT (OUTPATIENT)
Dept: LAB | Facility: HOSPITAL | Age: 59
End: 2019-05-10
Attending: INTERNAL MEDICINE
Payer: COMMERCIAL

## 2019-05-10 ENCOUNTER — OFFICE VISIT (OUTPATIENT)
Dept: INTERNAL MEDICINE | Facility: CLINIC | Age: 59
End: 2019-05-10
Payer: COMMERCIAL

## 2019-05-10 VITALS
WEIGHT: 190.5 LBS | DIASTOLIC BLOOD PRESSURE: 86 MMHG | BODY MASS INDEX: 32.7 KG/M2 | OXYGEN SATURATION: 98 % | SYSTOLIC BLOOD PRESSURE: 134 MMHG | HEART RATE: 83 BPM

## 2019-05-10 DIAGNOSIS — F41.9 ANXIETY AND DEPRESSION: ICD-10-CM

## 2019-05-10 DIAGNOSIS — E78.5 HYPERLIPIDEMIA, UNSPECIFIED HYPERLIPIDEMIA TYPE: ICD-10-CM

## 2019-05-10 DIAGNOSIS — F32.A ANXIETY AND DEPRESSION: ICD-10-CM

## 2019-05-10 DIAGNOSIS — Z00.00 ROUTINE PHYSICAL EXAMINATION: ICD-10-CM

## 2019-05-10 DIAGNOSIS — Z00.00 ROUTINE PHYSICAL EXAMINATION: Primary | ICD-10-CM

## 2019-05-10 DIAGNOSIS — I10 ESSENTIAL HYPERTENSION: ICD-10-CM

## 2019-05-10 LAB
25(OH)D3+25(OH)D2 SERPL-MCNC: 27 NG/ML (ref 30–96)
ALBUMIN SERPL BCP-MCNC: 4.3 G/DL (ref 3.5–5.2)
ALP SERPL-CCNC: 93 U/L (ref 55–135)
ALT SERPL W/O P-5'-P-CCNC: 13 U/L (ref 10–44)
ANION GAP SERPL CALC-SCNC: 7 MMOL/L (ref 8–16)
AST SERPL-CCNC: 16 U/L (ref 10–40)
BASOPHILS # BLD AUTO: 0.02 K/UL (ref 0–0.2)
BASOPHILS NFR BLD: 0.2 % (ref 0–1.9)
BILIRUB SERPL-MCNC: 0.4 MG/DL (ref 0.1–1)
BUN SERPL-MCNC: 18 MG/DL (ref 6–20)
CALCIUM SERPL-MCNC: 9.6 MG/DL (ref 8.7–10.5)
CHLORIDE SERPL-SCNC: 105 MMOL/L (ref 95–110)
CHOLEST SERPL-MCNC: 134 MG/DL (ref 120–199)
CHOLEST/HDLC SERPL: 2.6 {RATIO} (ref 2–5)
CO2 SERPL-SCNC: 31 MMOL/L (ref 23–29)
CREAT SERPL-MCNC: 0.8 MG/DL (ref 0.5–1.4)
DIFFERENTIAL METHOD: ABNORMAL
EOSINOPHIL # BLD AUTO: 0.2 K/UL (ref 0–0.5)
EOSINOPHIL NFR BLD: 2.4 % (ref 0–8)
ERYTHROCYTE [DISTWIDTH] IN BLOOD BY AUTOMATED COUNT: 13.3 % (ref 11.5–14.5)
EST. GFR  (AFRICAN AMERICAN): >60 ML/MIN/1.73 M^2
EST. GFR  (NON AFRICAN AMERICAN): >60 ML/MIN/1.73 M^2
GLUCOSE SERPL-MCNC: 85 MG/DL (ref 70–110)
HCT VFR BLD AUTO: 44.4 % (ref 37–48.5)
HDLC SERPL-MCNC: 52 MG/DL (ref 40–75)
HDLC SERPL: 38.8 % (ref 20–50)
HGB BLD-MCNC: 14.1 G/DL (ref 12–16)
LDLC SERPL CALC-MCNC: 58.4 MG/DL (ref 63–159)
LYMPHOCYTES # BLD AUTO: 2.1 K/UL (ref 1–4.8)
LYMPHOCYTES NFR BLD: 24.7 % (ref 18–48)
MCH RBC QN AUTO: 29.9 PG (ref 27–31)
MCHC RBC AUTO-ENTMCNC: 31.8 G/DL (ref 32–36)
MCV RBC AUTO: 94 FL (ref 82–98)
MONOCYTES # BLD AUTO: 0.7 K/UL (ref 0.3–1)
MONOCYTES NFR BLD: 8.6 % (ref 4–15)
NEUTROPHILS # BLD AUTO: 5.4 K/UL (ref 1.8–7.7)
NEUTROPHILS NFR BLD: 64 % (ref 38–73)
NONHDLC SERPL-MCNC: 82 MG/DL
PLATELET # BLD AUTO: 294 K/UL (ref 150–350)
PMV BLD AUTO: 11.2 FL (ref 9.2–12.9)
POTASSIUM SERPL-SCNC: 3.8 MMOL/L (ref 3.5–5.1)
PROT SERPL-MCNC: 6.8 G/DL (ref 6–8.4)
RBC # BLD AUTO: 4.71 M/UL (ref 4–5.4)
SODIUM SERPL-SCNC: 143 MMOL/L (ref 136–145)
TRIGL SERPL-MCNC: 118 MG/DL (ref 30–150)
TSH SERPL DL<=0.005 MIU/L-ACNC: 0.86 UIU/ML (ref 0.4–4)
WBC # BLD AUTO: 8.47 K/UL (ref 3.9–12.7)

## 2019-05-10 PROCEDURE — 82306 VITAMIN D 25 HYDROXY: CPT

## 2019-05-10 PROCEDURE — 99999 PR PBB SHADOW E&M-EST. PATIENT-LVL III: ICD-10-PCS | Mod: PBBFAC,,, | Performed by: INTERNAL MEDICINE

## 2019-05-10 PROCEDURE — 3075F PR MOST RECENT SYSTOLIC BLOOD PRESS GE 130-139MM HG: ICD-10-PCS | Mod: CPTII,S$GLB,, | Performed by: INTERNAL MEDICINE

## 2019-05-10 PROCEDURE — 99396 PR PREVENTIVE VISIT,EST,40-64: ICD-10-PCS | Mod: S$GLB,,, | Performed by: INTERNAL MEDICINE

## 2019-05-10 PROCEDURE — 80053 COMPREHEN METABOLIC PANEL: CPT

## 2019-05-10 PROCEDURE — 3079F PR MOST RECENT DIASTOLIC BLOOD PRESSURE 80-89 MM HG: ICD-10-PCS | Mod: CPTII,S$GLB,, | Performed by: INTERNAL MEDICINE

## 2019-05-10 PROCEDURE — 3075F SYST BP GE 130 - 139MM HG: CPT | Mod: CPTII,S$GLB,, | Performed by: INTERNAL MEDICINE

## 2019-05-10 PROCEDURE — 36415 COLL VENOUS BLD VENIPUNCTURE: CPT

## 2019-05-10 PROCEDURE — 84443 ASSAY THYROID STIM HORMONE: CPT

## 2019-05-10 PROCEDURE — 99396 PREV VISIT EST AGE 40-64: CPT | Mod: S$GLB,,, | Performed by: INTERNAL MEDICINE

## 2019-05-10 PROCEDURE — 80061 LIPID PANEL: CPT

## 2019-05-10 PROCEDURE — 85025 COMPLETE CBC W/AUTO DIFF WBC: CPT

## 2019-05-10 PROCEDURE — 3079F DIAST BP 80-89 MM HG: CPT | Mod: CPTII,S$GLB,, | Performed by: INTERNAL MEDICINE

## 2019-05-10 PROCEDURE — 99999 PR PBB SHADOW E&M-EST. PATIENT-LVL III: CPT | Mod: PBBFAC,,, | Performed by: INTERNAL MEDICINE

## 2019-05-10 RX ORDER — RIBOFLAVIN (VITAMIN B2) 100 MG
1000 TABLET ORAL 2 TIMES DAILY
COMMUNITY

## 2019-05-10 RX ORDER — CITALOPRAM 40 MG/1
40 TABLET, FILM COATED ORAL DAILY
Qty: 90 TABLET | Refills: 4 | Status: SHIPPED | OUTPATIENT
Start: 2019-05-10 | End: 2020-05-04 | Stop reason: SDUPTHER

## 2019-05-10 RX ORDER — LORAZEPAM 1 MG/1
TABLET ORAL
Qty: 30 TABLET | Refills: 5 | Status: SHIPPED | OUTPATIENT
Start: 2019-05-10 | End: 2019-11-20 | Stop reason: SDUPTHER

## 2019-05-10 RX ORDER — SIMVASTATIN 40 MG/1
40 TABLET, FILM COATED ORAL DAILY
Qty: 90 TABLET | Refills: 4 | Status: SHIPPED | OUTPATIENT
Start: 2019-05-10 | End: 2020-05-04 | Stop reason: SDUPTHER

## 2019-05-10 NOTE — PROGRESS NOTES
Subjective:       Patient ID: Marielena Tracy is a 58 y.o. female.    Chief Complaint: Medication Refill    Patient here for annual exam and refill of anxiety medicine.  She would like to get labs done.  She is tolerating anxiety medication well.  She has not escalated her does.  No side effects.  Understands habit-forming nature.  She is feeling much better about her feet now that she has inserts in place.  She recently saw Podiatry and this is going well    Review of Systems   Constitutional: Negative for activity change and unexpected weight change.   HENT: Negative for hearing loss, rhinorrhea and trouble swallowing.    Eyes: Negative for discharge and visual disturbance.   Respiratory: Negative for chest tightness and wheezing.    Cardiovascular: Negative for chest pain and palpitations.   Gastrointestinal: Negative for blood in stool, constipation, diarrhea and vomiting.   Endocrine: Negative for polydipsia and polyuria.   Genitourinary: Negative for difficulty urinating, dysuria, hematuria and menstrual problem.   Musculoskeletal: Positive for arthralgias (Feet pain improving). Negative for joint swelling and neck pain.   Neurological: Negative for weakness and headaches.   Psychiatric/Behavioral: Positive for sleep disturbance. Negative for confusion and dysphoric mood. The patient is nervous/anxious.        Objective:      Physical Exam   Constitutional: She is oriented to person, place, and time. She appears well-developed and well-nourished. No distress.   HENT:   Head: Normocephalic and atraumatic.   Right Ear: External ear normal.   Left Ear: External ear normal.   Mouth/Throat: Oropharynx is clear and moist. No oropharyngeal exudate.   Eyes: Pupils are equal, round, and reactive to light. Conjunctivae are normal. No scleral icterus.   Neck: Normal range of motion. Neck supple. No thyromegaly present.   Cardiovascular: Normal rate and regular rhythm.   No murmur heard.  Pulmonary/Chest: Effort  normal and breath sounds normal. She has no wheezes.   Abdominal: Soft. Bowel sounds are normal. She exhibits no distension. There is no tenderness.   Musculoskeletal: She exhibits tenderness (Bilateral arches improving).   Lymphadenopathy:     She has no cervical adenopathy.   Neurological: She is alert and oriented to person, place, and time.   Skin: No rash noted.   Psychiatric: She has a normal mood and affect.       Assessment:       1. Routine physical examination    2. Essential hypertension    3. Hyperlipidemia, unspecified hyperlipidemia type    4. Anxiety and depression        Plan:       Marielena was seen today for medication refill.    Diagnoses and all orders for this visit:    Routine physical examination  -     Vitamin D; Future  -     TSH; Future  -     Lipid panel; Future  -     Comprehensive metabolic panel; Future  -     CBC auto differential; Future    Essential hypertension    Hyperlipidemia, unspecified hyperlipidemia type    Anxiety and depression    Other orders  -     LORazepam (ATIVAN) 1 MG tablet; TAKE 1 TABLET (1 MG TOTAL) BY MOUTH EVERY EVENING.  -     citalopram (CELEXA) 40 MG tablet; Take 1 tablet (40 mg total) by mouth once daily.  -     simvastatin (ZOCOR) 40 MG tablet; Take 1 tablet (40 mg total) by mouth once daily.

## 2019-05-13 ENCOUNTER — PATIENT MESSAGE (OUTPATIENT)
Dept: INTERNAL MEDICINE | Facility: CLINIC | Age: 59
End: 2019-05-13

## 2019-09-13 ENCOUNTER — TELEPHONE (OUTPATIENT)
Dept: INTERNAL MEDICINE | Facility: CLINIC | Age: 59
End: 2019-09-13

## 2019-09-13 ENCOUNTER — PATIENT MESSAGE (OUTPATIENT)
Dept: INTERNAL MEDICINE | Facility: CLINIC | Age: 59
End: 2019-09-13

## 2019-09-13 DIAGNOSIS — Z12.39 BREAST CANCER SCREENING: Primary | ICD-10-CM

## 2019-09-15 ENCOUNTER — HOSPITAL ENCOUNTER (EMERGENCY)
Facility: HOSPITAL | Age: 59
Discharge: HOME | End: 2019-09-15
Attending: EMERGENCY MEDICINE
Payer: COMMERCIAL

## 2019-09-15 VITALS
OXYGEN SATURATION: 99 % | RESPIRATION RATE: 16 BRPM | TEMPERATURE: 98 F | DIASTOLIC BLOOD PRESSURE: 88 MMHG | BODY MASS INDEX: 32.61 KG/M2 | SYSTOLIC BLOOD PRESSURE: 135 MMHG | WEIGHT: 190 LBS | HEART RATE: 68 BPM
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
## 2019-09-15 DIAGNOSIS — S82.042A CLOSED DISPLACED COMMINUTED FRACTURE OF LEFT PATELLA, INITIAL ENCOUNTER: Primary | ICD-10-CM

## 2019-09-15 DIAGNOSIS — M25.562 LEFT KNEE PAIN: ICD-10-CM

## 2019-09-15 PROBLEM — S82.032A DISPLACED TRANSVERSE FRACTURE OF LEFT PATELLA, INITIAL ENCOUNTER FOR CLOSED FRACTURE: Status: ACTIVE | Noted: 2019-09-15

## 2019-09-15 LAB
ALBUMIN SERPL BCP-MCNC: 4.3 G/DL (ref 3.5–5.2)
ALP SERPL-CCNC: 94 U/L (ref 55–135)
ALT SERPL W/O P-5'-P-CCNC: 16 U/L (ref 10–44)
ANION GAP SERPL CALC-SCNC: 11 MMOL/L (ref 8–16)
AST SERPL-CCNC: 18 U/L (ref 10–40)
BASOPHILS # BLD AUTO: 0.05 K/UL (ref 0–0.2)
BASOPHILS NFR BLD: 0.6 % (ref 0–1.9)
BILIRUB SERPL-MCNC: 0.5 MG/DL (ref 0.1–1)
BUN SERPL-MCNC: 12 MG/DL (ref 6–20)
CALCIUM SERPL-MCNC: 9.6 MG/DL (ref 8.7–10.5)
CHLORIDE SERPL-SCNC: 106 MMOL/L (ref 95–110)
CO2 SERPL-SCNC: 24 MMOL/L (ref 23–29)
CREAT SERPL-MCNC: 0.8 MG/DL (ref 0.5–1.4)
DIFFERENTIAL METHOD: ABNORMAL
EOSINOPHIL # BLD AUTO: 0.2 K/UL (ref 0–0.5)
EOSINOPHIL NFR BLD: 1.8 % (ref 0–8)
ERYTHROCYTE [DISTWIDTH] IN BLOOD BY AUTOMATED COUNT: 12.4 % (ref 11.5–14.5)
EST. GFR  (AFRICAN AMERICAN): >60 ML/MIN/1.73 M^2
EST. GFR  (NON AFRICAN AMERICAN): >60 ML/MIN/1.73 M^2
GLUCOSE SERPL-MCNC: 102 MG/DL (ref 70–110)
HCT VFR BLD AUTO: 43.3 % (ref 37–48.5)
HGB BLD-MCNC: 14 G/DL (ref 12–16)
IMM GRANULOCYTES # BLD AUTO: 0.04 K/UL (ref 0–0.04)
IMM GRANULOCYTES NFR BLD AUTO: 0.4 % (ref 0–0.5)
INR PPP: 1.1 (ref 0.8–1.2)
LYMPHOCYTES # BLD AUTO: 1.6 K/UL (ref 1–4.8)
LYMPHOCYTES NFR BLD: 17.3 % (ref 18–48)
MCH RBC QN AUTO: 30.2 PG (ref 27–31)
MCHC RBC AUTO-ENTMCNC: 32.3 G/DL (ref 32–36)
MCV RBC AUTO: 94 FL (ref 82–98)
MONOCYTES # BLD AUTO: 0.6 K/UL (ref 0.3–1)
MONOCYTES NFR BLD: 6.1 % (ref 4–15)
NEUTROPHILS # BLD AUTO: 6.7 K/UL (ref 1.8–7.7)
NEUTROPHILS NFR BLD: 73.8 % (ref 38–73)
NRBC BLD-RTO: 0 /100 WBC
PLATELET # BLD AUTO: 265 K/UL (ref 150–350)
PMV BLD AUTO: 10.4 FL (ref 9.2–12.9)
POTASSIUM SERPL-SCNC: 3.9 MMOL/L (ref 3.5–5.1)
PROT SERPL-MCNC: 6.5 G/DL (ref 6–8.4)
PROTHROMBIN TIME: 11.1 SEC (ref 9–12.5)
RBC # BLD AUTO: 4.63 M/UL (ref 4–5.4)
SODIUM SERPL-SCNC: 141 MMOL/L (ref 136–145)
WBC # BLD AUTO: 9.01 K/UL (ref 3.9–12.7)

## 2019-09-15 PROCEDURE — 93010 ELECTROCARDIOGRAM REPORT: CPT | Mod: ,,, | Performed by: INTERNAL MEDICINE

## 2019-09-15 PROCEDURE — 85610 PROTHROMBIN TIME: CPT

## 2019-09-15 PROCEDURE — 27520 TREAT KNEECAP FRACTURE: CPT | Mod: 54,LT,, | Performed by: EMERGENCY MEDICINE

## 2019-09-15 PROCEDURE — 25000003 PHARM REV CODE 250: Performed by: EMERGENCY MEDICINE

## 2019-09-15 PROCEDURE — 93005 ELECTROCARDIOGRAM TRACING: CPT | Mod: 59

## 2019-09-15 PROCEDURE — 93010 EKG 12-LEAD: ICD-10-PCS | Mod: ,,, | Performed by: INTERNAL MEDICINE

## 2019-09-15 PROCEDURE — 85025 COMPLETE CBC W/AUTO DIFF WBC: CPT

## 2019-09-15 PROCEDURE — 99285 EMERGENCY DEPT VISIT HI MDM: CPT | Mod: 25

## 2019-09-15 PROCEDURE — 20610 DRAIN/INJ JOINT/BURSA W/O US: CPT | Mod: LT

## 2019-09-15 PROCEDURE — 99284 EMERGENCY DEPT VISIT MOD MDM: CPT | Mod: 57,,, | Performed by: EMERGENCY MEDICINE

## 2019-09-15 PROCEDURE — 80053 COMPREHEN METABOLIC PANEL: CPT

## 2019-09-15 PROCEDURE — 99284 PR EMERGENCY DEPT VISIT,LEVEL IV: ICD-10-PCS | Mod: 57,,, | Performed by: EMERGENCY MEDICINE

## 2019-09-15 PROCEDURE — 27520 PR CLOSED RX PATELLA FX: ICD-10-PCS | Mod: 54,LT,, | Performed by: EMERGENCY MEDICINE

## 2019-09-15 PROCEDURE — 25500020 PHARM REV CODE 255: Performed by: EMERGENCY MEDICINE

## 2019-09-15 PROCEDURE — 29505 APPLICATION LONG LEG SPLINT: CPT

## 2019-09-15 RX ORDER — OXYCODONE AND ACETAMINOPHEN 5; 325 MG/1; MG/1
1 TABLET ORAL EVERY 4 HOURS PRN
Qty: 18 TABLET | Refills: 0 | Status: SHIPPED | OUTPATIENT
Start: 2019-09-15 | End: 2019-09-16 | Stop reason: SDUPTHER

## 2019-09-15 RX ORDER — ONDANSETRON 4 MG/1
4 TABLET, ORALLY DISINTEGRATING ORAL ONCE
Status: COMPLETED | OUTPATIENT
Start: 2019-09-15 | End: 2019-09-15

## 2019-09-15 RX ORDER — OXYCODONE AND ACETAMINOPHEN 5; 325 MG/1; MG/1
1 TABLET ORAL
Status: COMPLETED | OUTPATIENT
Start: 2019-09-15 | End: 2019-09-15

## 2019-09-15 RX ORDER — ONDANSETRON 4 MG/1
4 TABLET, ORALLY DISINTEGRATING ORAL EVERY 6 HOURS PRN
Qty: 18 TABLET | Refills: 0 | Status: SHIPPED | OUTPATIENT
Start: 2019-09-15 | End: 2019-09-16 | Stop reason: SDUPTHER

## 2019-09-15 RX ADMIN — IOHEXOL 100 ML: 350 INJECTION, SOLUTION INTRAVENOUS at 04:09

## 2019-09-15 RX ADMIN — ONDANSETRON 4 MG: 4 TABLET, ORALLY DISINTEGRATING ORAL at 05:09

## 2019-09-15 RX ADMIN — OXYCODONE HYDROCHLORIDE AND ACETAMINOPHEN 1 TABLET: 5; 325 TABLET ORAL at 05:09

## 2019-09-15 NOTE — ED NOTES
LOC: The patient is awake and alert; oriented x 3 and speaking appropriately.  APPEARANCE: Patient resting comfortably, patient is clean and well groomed  SKIN: warm and dry, normal skin turgor & moist mucus membranes, skin intact, no breakdown noted.Swelling and bruising to left knee  MUSCULOSKELETAL: Patient moving all extremities well, bruising and swelling to left knee.   RESPIRATORY: Airway is open and patent, ; respirations are spontaneous, normal effort and rate  CARDIAC: Patient has a normal rate, no peripheral edema noted, capillary refill < 3 seconds; No complaints of chest pain   ABDOMEN: Soft and non tender to palpation, no distention noted.

## 2019-09-15 NOTE — HPI
Marielena Tracy is a 58 y.o. female with no PMHx presenting with R knee pain. Pt had a mechanical fall onto L knee. [place] immediate pain and swelling over L knee. Couldn't ambulate or extend her knee after injury so EMS brought her to ED. Severe pain during any activity. Patient denies any head trauma or LOC. The patient denies prior hx of falls. Patient denies numbness and tingling. Denies pain elsewhere. Walks w/out assistance at baseline. No previous ortho hx. Not on any bloodthinners.

## 2019-09-15 NOTE — ED TRIAGE NOTES
Slipped on a wet puppy pad  And fell onto her left knee. Denies striking head or  Loc.left knee swollen and bruised

## 2019-09-15 NOTE — ED PROVIDER NOTES
Encounter Date: 9/15/2019       History     Chief Complaint   Patient presents with    Fall     arrived [facility] from home with c/o slip and fall onto knees, c/o left knee pain, swelling noted, no obvious deformity, leg splinted and ice applied to side per EMS, given 100mcg fentanyl by EMS     58-year-old female with a history of hyperlipidemia presents after a ground level fall at home prior to arrival.  Patient states she slipped on a dog mat and went down on her left knee.  She had immediate pain and swelling. She was unable to get off ground.  EMS had to straighten her leg and lift her out of the house.  She denies hitting her head, neck, or losing consciousness.  She denies any other traumatic injuries.  Patient denies nausea, vomiting, diarrhea, fever, cough, shortness of breath, chest pain, abdominal pain, or dysuria.  A ten point review of systems was completed and is negative except as documented above.  Patient denies any other acute medical complaint.  The patients available PMH, PSH, Social History, medications, allergies, and triage vital signs were reviewed just prior to their medical evaluation.        Review of patient's allergies indicates:   Allergen Reactions    Sulfa (sulfonamide antibiotics)      Past Medical History:   Diagnosis Date    Anxiety and depression 2014    Colon polyps     Glaucoma     Hyperlipidemia     Hypertension     Keloid cicatrix     Obesity (BMI 30.0-34.9)      Past Surgical History:   Procedure Laterality Date     SECTION      x2    COLONOSCOPY N/A 2016    Performed by SHANNON Nj MD at [facility] (4TH FLR)     Family History   Problem Relation Age of Onset    Dementia Father     Breast cancer Sister     Cancer Paternal Grandmother     Cancer Brother     Glaucoma Maternal Uncle     Colon cancer Neg Hx     Ovarian cancer Neg Hx     Blindness Neg Hx     Macular degeneration Neg Hx     Retinal detachment Neg Hx      Social History      Tobacco Use    Smoking status: Former Smoker    Smokeless tobacco: Never Used   Substance Use Topics    Alcohol use: Yes     Frequency: 2-3 times a week     Drinks per session: 1 or 2     Comment: few times a week     Drug use: No     Review of Systems   Constitutional: Negative for fever.   HENT: Negative for sore throat.    Eyes: Negative for visual disturbance.   Respiratory: Negative for cough and shortness of breath.    Cardiovascular: Negative for chest pain.   Gastrointestinal: Negative for abdominal pain, diarrhea, nausea and vomiting.   Genitourinary: Negative for dysuria.   Musculoskeletal: Positive for arthralgias, gait problem and joint swelling. Negative for neck pain.   Skin: Negative for rash and wound.   Allergic/Immunologic: Negative for immunocompromised state.   Neurological: Negative for syncope.   Psychiatric/Behavioral: Negative for confusion.   All other systems reviewed and are negative.      Physical Exam     Initial Vitals [09/15/19 1451]   BP Pulse Resp Temp SpO2   (!) 166/90 90 18 97.9 °F (36.6 °C) 99 %      MAP       --         Physical Exam    Nursing note and vitals reviewed.  Constitutional: She appears well-developed and well-nourished. She is not diaphoretic. No distress.   HENT:   Head: Normocephalic and atraumatic.   Nose: Nose normal.   Eyes: Conjunctivae are normal. Right eye exhibits no discharge. Left eye exhibits no discharge.   Neck: Normal range of motion. Neck supple.   No ttp   Cardiovascular: Normal rate, regular rhythm, normal heart sounds and intact distal pulses. Exam reveals no gallop and no friction rub.    No murmur heard.  Pulmonary/Chest: Breath sounds normal. No respiratory distress. She has no wheezes. She has no rhonchi. She has no rales.   Abdominal: Soft. She exhibits no distension. There is no tenderness. There is no rebound and no guarding.   Musculoskeletal: She exhibits edema and tenderness.   Left knee with effusion and bruising over patella,  cannot flex   Neurological: She is alert and oriented to person, place, and time. She has normal strength. GCS score is 15. GCS eye subscore is 4. GCS verbal subscore is 5. GCS motor subscore is 6.   Skin: Skin is warm and dry. No rash noted. No erythema.   Psychiatric: She has a normal mood and affect. Her behavior is normal. Judgment and thought content normal.         ED Course   Procedures  Labs Reviewed   CBC W/ AUTO DIFFERENTIAL - Abnormal; Notable for the following components:       Result Value    Gran% 73.8 (*)     Lymph% 17.3 (*)     All other components within normal limits   PROTIME-INR   COMPREHENSIVE METABOLIC PANEL     EKG Readings: (Independently Interpreted)   Initial Reading: No STEMI. Rhythm: Normal Sinus Rhythm. Heart Rate: 69. Ectopy: No Ectopy. Conduction: Normal. ST Segments: Normal ST Segments. T Waves: Normal.       Imaging Results          CT Knee With Contrast Left (Final result)  Result time 09/15/19 16:43:53    Final result by Davide Spivey DO (09/15/19 16:43:53)                 Impression:      Redemonstrated comminuted transverse fracture inferior 3rd of the left patella with distraction of the fracture elements.  No evidence for dislocation left knee.    Soft tissue swelling induration overlies the prepatellar soft tissues.      Electronically signed by: Davide Spivey DO  Date:    09/15/2019  Time:    16:43             Narrative:    EXAMINATION:  CT KNEE WITH CONTRAST LEFT    CLINICAL HISTORY:  Fracture, knee;    TECHNIQUE:  2.0 mm axial CT images of the left knee postcontrast.  Coronal and sagittal reformatted imaging from the axial acquisition.  100 cc of Omnipaque 350 contrast was infused intravenously.    COMPARISON:  Radiograph right knee earlier same day    FINDINGS:  There is a comminuted transverse fracture deformity of the inferior 3rd of the patella with distraction of the fracture elements of approximate 1.8 cm anteriorly and 1.4 cm posteriorly.  Multiple punctate  fracture fragments within the soft tissues along the fracture gap ventrally.  There is overlying soft tissue swelling induration in the prepatellar soft tissues.  There is no evidence for peripheral enhancing collection.    The popliteal artery and tibial peroneal trunk is patent without focal stenosis.  There is no evidence for fracture or dislocation of the femoral tibial joints with mild degenerative changes with slight joint space loss and articular sclerosis.                               X-Ray Knee 1 or 2 View Left (Final result)  Result time 09/15/19 16:27:34    Final result by Davide Spivey DO (09/15/19 16:27:34)                 Impression:      Please see above      Electronically signed by: Davide Spivey DO  Date:    09/15/2019  Time:    16:27             Narrative:    EXAMINATION:  XR KNEE 1 OR 2 VIEW LEFT    CLINICAL HISTORY:  Pain in left knee    TECHNIQUE:  AP and cross-table lateral views left knee    COMPARISON:  None    FINDINGS:  Comminuted transverse fracture inferior pole of left patella with slight distraction of the fracture components.  There is overlying diffuse soft tissue swelling and induration within the prepatellar soft tissues.  No evidence for dislocation.  Clinical correlation and orthopedic surgical evaluation recommended.                              X-Rays:   Independently Interpreted Readings:   Other Readings:  Reviewed X-ray image, patellar fracture    Medical Decision Making:   History:   I obtained history from: EMS provider and someone other than patient.  Old Medical Records: I decided to obtain old medical records.  Independently Interpreted Test(s):   I have ordered and independently interpreted X-rays - see prior notes.  I have ordered and independently interpreted EKG Reading(s) - see prior notes  Clinical Tests:   Lab Tests: Ordered and Reviewed  Radiological Study: Ordered and Reviewed  Medical Tests: Ordered and Reviewed  ED Management:  58-year-old female presents  after a ground level fall at home with left knee pain and swelling.  Vitals with hypertension.  Extensive physical exam otherwise benign.  X-ray with fractured patella.  CT confirms.  Labs unremarkable. EKG unremarkable. Discussed with Orthopedics who evaluated bedside and completed arthrocentesis.  Please see their note.  Placed in knee immobilizer.  Will discharge with Percocet and Zofran.  She will follow up with Orthopedics early next week for surgical evaluation.  Patient will return to ED for worsening symptoms, inability to eat/drink, fever greater than 100.4, or any other concerns.  Did bedside teaching with return precautions.  All questions answered.  The patient acknowledges understanding.  Gave verbal discharge instructions.  Other:   I have discussed this case with another health care provider.       <> Summary of the Discussion: ortho                      Clinical Impression:   Final diagnoses:  [W19.XXXA] Fall  [M25.562] Left knee pain  [S82.042A] Closed displaced comminuted fracture of left patella, initial encounter (Primary)      Disposition:   Disposition: Discharged  Condition: Stable       Level of Complexity:  High, level 5.                 Janes Gregory MD  09/15/19 1240

## 2019-09-15 NOTE — ASSESSMENT & PLAN NOTE
Marielena Tracy is a 58 y.o. female with no PMHx presents with L displaced transverse patella fracture. Closed, NVI, non-intact extensor mechanism.  - Symptomatic aspiration performed at the bedside in ED - provided mild relief  - Compressive wrap, Ice, and elevation to help swelling  - Knee immobilizer  - TTWB LLE  - Pain control per ED  - F/u in ortho clinic in 1 week

## 2019-09-15 NOTE — SUBJECTIVE & OBJECTIVE
Past Medical History:   Diagnosis Date    Anxiety and depression 2014    Colon polyps     Glaucoma     Hyperlipidemia     Hypertension     Keloid cicatrix     Obesity (BMI 30.0-34.9)        Past Surgical History:   Procedure Laterality Date     SECTION      x2    COLONOSCOPY N/A 2016    Performed by SHANNON Nj MD at [facility] ([address] [place] [address])       Review of patient's allergies indicates:   Allergen Reactions    Sulfa (sulfonamide antibiotics)        No current facility-administered medications for this encounter.      Current Outpatient Medications   Medication Sig    ascorbic acid, vitamin C, (VITAMIN C) 100 MG tablet Take 100 mg by mouth 2 (two) times daily.     aspirin (ECOTRIN) 81 MG EC tablet Take 81 mg by mouth once daily.    CALCIUM CARBONATE/VITAMIN D3 (VITAMIN D-3 ORAL) Take 1,000 mg by mouth once daily.    citalopram (CELEXA) 40 MG tablet Take 1 tablet (40 mg total) by mouth once daily.    fish oil-omega-3 fatty acids 300-1,000 mg capsule Take 2 g by mouth once daily.    LORazepam (ATIVAN) 1 MG tablet TAKE 1 TABLET (1 MG TOTAL) BY MOUTH EVERY EVENING.    simvastatin (ZOCOR) 40 MG tablet Take 1 tablet (40 mg total) by mouth once daily.    urea 50 % Crea Apply 1 application topically once daily. Apply to affected area    clobetasol 0.05% (TEMOVATE) 0.05 % Oint AAA bid    conjugated estrogens (PREMARIN) vaginal cream 1/2 applicator vaginally twice weekly H.S.    latanoprost 0.005 % ophthalmic solution Place 1 drop into both eyes every evening.    triamcinolone acetonide 0.1% (KENALOG) 0.1 % ointment AAA bid     Family History     Problem Relation (Age of Onset)    Breast cancer Sister    Cancer Paternal Grandmother, Brother    Dementia Father    Glaucoma Maternal Uncle        Tobacco Use    Smoking status: Former Smoker    Smokeless tobacco: Never Used   Substance and Sexual Activity    Alcohol use: Yes     Frequency: 2-3 times a week     Drinks per session: 1 or 2      Comment: few times a week     Drug use: No    Sexual activity: Yes     Partners: Male     Birth control/protection: Post-menopausal     ROS     Per ED 9/15/19    Objective:     Vital Signs (Most Recent):  Temp: 97.9 °F (36.6 °C) (09/15/19 1451)  Pulse: 90 (09/15/19 1451)  Resp: 18 (09/15/19 1451)  BP: (!) 166/90 (09/15/19 1451)  SpO2: 99 % (09/15/19 1451) Vital Signs (24h Range):  Temp:  [97.9 °F (36.6 °C)] 97.9 °F (36.6 °C)  Pulse:  [90] 90  Resp:  [18] 18  SpO2:  [99 %] 99 %  BP: (166)/(90) 166/90     Weight: 86.2 kg (190 lb)     Body mass index is 32.61 kg/m².    No intake or output data in the 24 hours ending 09/15/19 1659    Ortho/SPM Exam     Gen:  No acute distress  CV:  Peripherally well-perfused.    Lungs:  Normal respiratory effort.  Head/Neck:  Normocephalic.  Atraumatic.    MSK:  LLE:  - Skin intact throughout, no scars present  - Significant suprapatellar knee effusion with erythema and ecchymosis  - No signs of cellulitis  - TTP throughout anterior knee. NonTTP over medial and lateral joint lines  - AROM and PROM of ankle and hip intact w/out pain  - Not able to actively extend her knee. Able to flex her knee, but with significant pain.  - TA/EHL/Gastroc/FHL assessed in isolation without deficit  - SILT throughout  - Compartments soft  - DP and PT palpated  2+  - Capillary Refill <3s  - Negative Log roll  - Negative Stinchfield    All other joints (shoulder/elbow/wrist/hip/knee/ankle) were examined and had full ROM and were non-tender to palpation.    Significant Labs: All pertinent labs within the past 24 hours have been reviewed.    Significant Imaging: I have reviewed all pertinent imaging results/findings.   Xray and CT L knee: comminuted transverse displaced inferior 1/3 patella fracture, no other fracures    Procedure Note: L knee aspiration  Patient was explained risks, benefits, and alternatives to treatment and verbalized consent to proceed. Time out was performed and patient name, ,  site, and procedure were confirmed. Skin was sterilely prepared with alcohol solution. 18 gauge needle on a 60 cc syringe was used for aspiration through superolateral approach. 10 cc of blood colored fluid collected. Aspiration site was covered with a bandaid.

## 2019-09-15 NOTE — DISCHARGE INSTRUCTIONS
Do not drink or drive on narcotic pain medication.    Our goal in the emergency department is to always give you outstanding care and exceptional service. You may receive a survey by mail or e-mail in the next week regarding your experience in our ED. We would greatly appreciate your completing and returning the survey. Your feedback provides us with a way to recognize our staff who give very good care and it helps us learn how to improve when your experience was below our aspiration of excellence.

## 2019-09-15 NOTE — CONSULTS
[facility]  Orthopedics  Consult Note    Patient Name: Marielena Tracy  MRN: [id number]  Admission Date: 9/15/2019  Hospital Length of Stay: 0 days  Attending Provider: Janes Gregory MD  Primary Care Provider: Delroy Gregory MD    Inpatient consult to Orthopedic Surgery  Consult performed by: Gage Kendall MD  Consult ordered by: Janes Gregory MD        Subjective:     Principal Problem:Displaced transverse fracture of left patella, initial encounter for closed fracture    Chief Complaint:   Chief Complaint   Patient presents with    Fall     arrived [facility] from home with c/o slip and fall onto knees, c/o left knee pain, swelling noted, no obvious deformity, leg splinted and ice applied to side per EMS, given 100mcg fentanyl by EMS        HPI: Marielena Tracy is a 58 y.o. female with no PMHx presenting with R knee pain. Pt had a mechanical fall onto L knee. [place] immediate pain and swelling over L knee. Couldn't ambulate or extend her knee after injury so EMS brought her to ED. Severe pain during any activity. Patient denies any head trauma or LOC. The patient denies prior hx of falls. Patient denies numbness and tingling. Denies pain elsewhere. Walks w/out assistance at baseline. No previous ortho hx. Not on any bloodthinners.    Past Medical History:   Diagnosis Date    Anxiety and depression 2014    Colon polyps     Glaucoma     Hyperlipidemia     Hypertension     Keloid cicatrix     Obesity (BMI 30.0-34.9)        Past Surgical History:   Procedure Laterality Date     SECTION      x2    COLONOSCOPY N/A 2016    Performed by SHANNON Nj MD at [facility] ([address] [place] [address])       Review of patient's allergies indicates:   Allergen Reactions    Sulfa (sulfonamide antibiotics)        No current facility-administered medications for this encounter.      Current Outpatient Medications   Medication Sig    ascorbic acid, vitamin C, (VITAMIN C) 100 MG  tablet Take 100 mg by mouth 2 (two) times daily.     aspirin (ECOTRIN) 81 MG EC tablet Take 81 mg by mouth once daily.    CALCIUM CARBONATE/VITAMIN D3 (VITAMIN D-3 ORAL) Take 1,000 mg by mouth once daily.    citalopram (CELEXA) 40 MG tablet Take 1 tablet (40 mg total) by mouth once daily.    fish oil-omega-3 fatty acids 300-1,000 mg capsule Take 2 g by mouth once daily.    LORazepam (ATIVAN) 1 MG tablet TAKE 1 TABLET (1 MG TOTAL) BY MOUTH EVERY EVENING.    simvastatin (ZOCOR) 40 MG tablet Take 1 tablet (40 mg total) by mouth once daily.    urea 50 % Crea Apply 1 application topically once daily. Apply to affected area    clobetasol 0.05% (TEMOVATE) 0.05 % Oint AAA bid    conjugated estrogens (PREMARIN) vaginal cream 1/2 applicator vaginally twice weekly H.S.    latanoprost 0.005 % ophthalmic solution Place 1 drop into both eyes every evening.    triamcinolone acetonide 0.1% (KENALOG) 0.1 % ointment AAA bid     Family History     Problem Relation (Age of Onset)    Breast cancer Sister    Cancer Paternal Grandmother, Brother    Dementia Father    Glaucoma Maternal Uncle        Tobacco Use    Smoking status: Former Smoker    Smokeless tobacco: Never Used   Substance and Sexual Activity    Alcohol use: Yes     Frequency: 2-3 times a week     Drinks per session: 1 or 2     Comment: few times a week     Drug use: No    Sexual activity: Yes     Partners: Male     Birth control/protection: Post-menopausal     ROS     Per ED 9/15/19    Objective:     Vital Signs (Most Recent):  Temp: 97.9 °F (36.6 °C) (09/15/19 1451)  Pulse: 90 (09/15/19 1451)  Resp: 18 (09/15/19 1451)  BP: (!) 166/90 (09/15/19 1451)  SpO2: 99 % (09/15/19 1451) Vital Signs (24h Range):  Temp:  [97.9 °F (36.6 °C)] 97.9 °F (36.6 °C)  Pulse:  [90] 90  Resp:  [18] 18  SpO2:  [99 %] 99 %  BP: (166)/(90) 166/90     Weight: 86.2 kg (190 lb)     Body mass index is 32.61 kg/m².    No intake or output data in the 24 hours ending 09/15/19  1659    Ortho/SPM Exam     Gen:  No acute distress  CV:  Peripherally well-perfused.    Lungs:  Normal respiratory effort.  Head/Neck:  Normocephalic.  Atraumatic.    MSK:  LLE:  - Skin intact throughout, no scars present  - Significant suprapatellar knee effusion with erythema and ecchymosis  - No signs of cellulitis  - TTP throughout anterior knee. NonTTP over medial and lateral joint lines  - AROM and PROM of ankle and hip intact w/out pain  - Not able to actively extend her knee. Able to flex her knee, but with significant pain.  - TA/EHL/Gastroc/FHL assessed in isolation without deficit  - SILT throughout  - Compartments soft  - DP and PT palpated  2+  - Capillary Refill <3s  - Negative Log roll  - Negative Stinchfield    All other joints (shoulder/elbow/wrist/hip/knee/ankle) were examined and had full ROM and were non-tender to palpation.    Significant Labs: All pertinent labs within the past 24 hours have been reviewed.    Significant Imaging: I have reviewed all pertinent imaging results/findings.   Xray and CT L knee: comminuted transverse displaced inferior 1/3 patella fracture, no other fracures    Procedure Note: L knee aspiration  Patient was explained risks, benefits, and alternatives to treatment and verbalized consent to proceed. Time out was performed and patient name, , site, and procedure were confirmed. Skin was sterilely prepared with alcohol solution. 18 gauge needle on a 60 cc syringe was used for aspiration through superolateral approach. 10 cc of blood colored fluid collected. Aspiration site was covered with a bandaid.      Assessment/Plan:     * Displaced transverse fracture of left patella, initial encounter for closed fracture  Marielena Tracy is a 58 y.o. female with no PMHx presents with L displaced transverse patella fracture. Closed, NVI, non-intact extensor mechanism.  - L knee aspiration performed at the bedside in ED - provided mild relief  - Compressive wrap, Ice, and  elevation to help swelling  - Knee immobilizer  - WABT LLE so long as locked in full extension in knee immobilizer  - Pain control per ED  - Okay to discharge from ortho perspective  - F/u in ortho clinic in 1 week          Gage Kendall MD  Orthopedics  [facility]

## 2019-09-16 ENCOUNTER — TELEPHONE (OUTPATIENT)
Dept: INTERNAL MEDICINE | Facility: CLINIC | Age: 59
End: 2019-09-16

## 2019-09-16 ENCOUNTER — OFFICE VISIT (OUTPATIENT)
Dept: INTERNAL MEDICINE | Facility: CLINIC | Age: 59
End: 2019-09-16
Payer: COMMERCIAL

## 2019-09-16 ENCOUNTER — PATIENT MESSAGE (OUTPATIENT)
Dept: INTERNAL MEDICINE | Facility: CLINIC | Age: 59
End: 2019-09-16

## 2019-09-16 ENCOUNTER — OFFICE VISIT (OUTPATIENT)
Dept: SPORTS MEDICINE | Facility: CLINIC | Age: 59
End: 2019-09-16
Payer: COMMERCIAL

## 2019-09-16 VITALS
DIASTOLIC BLOOD PRESSURE: 81 MMHG | HEART RATE: 73 BPM | SYSTOLIC BLOOD PRESSURE: 126 MMHG | BODY MASS INDEX: 32.44 KG/M2 | WEIGHT: 190 LBS | HEIGHT: 64 IN

## 2019-09-16 VITALS
WEIGHT: 192 LBS | HEIGHT: 64 IN | SYSTOLIC BLOOD PRESSURE: 102 MMHG | HEART RATE: 76 BPM | BODY MASS INDEX: 32.78 KG/M2 | OXYGEN SATURATION: 97 % | DIASTOLIC BLOOD PRESSURE: 68 MMHG

## 2019-09-16 DIAGNOSIS — I10 ESSENTIAL HYPERTENSION: ICD-10-CM

## 2019-09-16 DIAGNOSIS — S82.032A DISPLACED TRANSVERSE FRACTURE OF LEFT PATELLA, INITIAL ENCOUNTER FOR CLOSED FRACTURE: Primary | ICD-10-CM

## 2019-09-16 DIAGNOSIS — Z01.810 PREOP CARDIOVASCULAR EXAM: Primary | ICD-10-CM

## 2019-09-16 DIAGNOSIS — S82.032A DISPLACED TRANSVERSE FRACTURE OF LEFT PATELLA, INITIAL ENCOUNTER FOR CLOSED FRACTURE: ICD-10-CM

## 2019-09-16 PROCEDURE — 3078F PR MOST RECENT DIASTOLIC BLOOD PRESSURE < 80 MM HG: ICD-10-PCS | Mod: CPTII,S$GLB,, | Performed by: INTERNAL MEDICINE

## 2019-09-16 PROCEDURE — 99999 PR PBB SHADOW E&M-EST. PATIENT-LVL IV: CPT | Mod: PBBFAC,,, | Performed by: INTERNAL MEDICINE

## 2019-09-16 PROCEDURE — 3078F DIAST BP <80 MM HG: CPT | Mod: CPTII,S$GLB,, | Performed by: INTERNAL MEDICINE

## 2019-09-16 PROCEDURE — 3074F PR MOST RECENT SYSTOLIC BLOOD PRESSURE < 130 MM HG: ICD-10-PCS | Mod: CPTII,S$GLB,, | Performed by: INTERNAL MEDICINE

## 2019-09-16 PROCEDURE — 99214 PR OFFICE/OUTPT VISIT, EST, LEVL IV, 30-39 MIN: ICD-10-PCS | Mod: S$GLB,,, | Performed by: INTERNAL MEDICINE

## 2019-09-16 PROCEDURE — 3008F BODY MASS INDEX DOCD: CPT | Mod: CPTII,S$GLB,, | Performed by: INTERNAL MEDICINE

## 2019-09-16 PROCEDURE — 99203 PR OFFICE/OUTPT VISIT, NEW, LEVL III, 30-44 MIN: ICD-10-PCS | Mod: S$GLB,,, | Performed by: ORTHOPAEDIC SURGERY

## 2019-09-16 PROCEDURE — 99203 OFFICE O/P NEW LOW 30 MIN: CPT | Mod: S$GLB,,, | Performed by: ORTHOPAEDIC SURGERY

## 2019-09-16 PROCEDURE — 99214 OFFICE O/P EST MOD 30 MIN: CPT | Mod: S$GLB,,, | Performed by: INTERNAL MEDICINE

## 2019-09-16 PROCEDURE — 99999 PR PBB SHADOW E&M-EST. PATIENT-LVL IV: ICD-10-PCS | Mod: PBBFAC,,, | Performed by: INTERNAL MEDICINE

## 2019-09-16 PROCEDURE — 3008F PR BODY MASS INDEX (BMI) DOCUMENTED: ICD-10-PCS | Mod: CPTII,S$GLB,, | Performed by: ORTHOPAEDIC SURGERY

## 2019-09-16 PROCEDURE — 3008F BODY MASS INDEX DOCD: CPT | Mod: CPTII,S$GLB,, | Performed by: ORTHOPAEDIC SURGERY

## 2019-09-16 PROCEDURE — 3074F SYST BP LT 130 MM HG: CPT | Mod: CPTII,S$GLB,, | Performed by: INTERNAL MEDICINE

## 2019-09-16 PROCEDURE — 99999 PR PBB SHADOW E&M-EST. PATIENT-LVL III: CPT | Mod: PBBFAC,,, | Performed by: ORTHOPAEDIC SURGERY

## 2019-09-16 PROCEDURE — 3079F PR MOST RECENT DIASTOLIC BLOOD PRESSURE 80-89 MM HG: ICD-10-PCS | Mod: CPTII,S$GLB,, | Performed by: ORTHOPAEDIC SURGERY

## 2019-09-16 PROCEDURE — 3079F DIAST BP 80-89 MM HG: CPT | Mod: CPTII,S$GLB,, | Performed by: ORTHOPAEDIC SURGERY

## 2019-09-16 PROCEDURE — 3074F SYST BP LT 130 MM HG: CPT | Mod: CPTII,S$GLB,, | Performed by: ORTHOPAEDIC SURGERY

## 2019-09-16 PROCEDURE — 3074F PR MOST RECENT SYSTOLIC BLOOD PRESSURE < 130 MM HG: ICD-10-PCS | Mod: CPTII,S$GLB,, | Performed by: ORTHOPAEDIC SURGERY

## 2019-09-16 PROCEDURE — 3008F PR BODY MASS INDEX (BMI) DOCUMENTED: ICD-10-PCS | Mod: CPTII,S$GLB,, | Performed by: INTERNAL MEDICINE

## 2019-09-16 PROCEDURE — 99999 PR PBB SHADOW E&M-EST. PATIENT-LVL III: ICD-10-PCS | Mod: PBBFAC,,, | Performed by: ORTHOPAEDIC SURGERY

## 2019-09-16 RX ORDER — OXYCODONE AND ACETAMINOPHEN 5; 325 MG/1; MG/1
1 TABLET ORAL EVERY 4 HOURS PRN
Qty: 18 TABLET | Refills: 0 | Status: SHIPPED | OUTPATIENT
Start: 2019-09-19 | End: 2019-09-23 | Stop reason: HOSPADM

## 2019-09-16 RX ORDER — ONDANSETRON 4 MG/1
4 TABLET, ORALLY DISINTEGRATING ORAL EVERY 6 HOURS PRN
Qty: 18 TABLET | Refills: 0 | Status: SHIPPED | OUTPATIENT
Start: 2019-09-19 | End: 2020-10-20

## 2019-09-16 NOTE — TELEPHONE ENCOUNTER
Pt is needing a letter for work letting them know she will not be able to work for the next 2 weeks due to her condition.

## 2019-09-16 NOTE — PROGRESS NOTES
CC:  Left knee pain    Occupation:  Desk work    Marielena Tracy is a 58 y.o. female presents for L knee evaluation. She sustained a fall yesterday and was seen in the emergency room by Orthopedics [facility].  She was diagnosed with a transverse patella fracture and placed in a knee immobilizer.  There is an attempt at aspiration of the knee which was unsuccessful.  Otherwise was no open injury.    She denies any previous injuries her patella. She denies any pain outside of her knee. Her hip and ankle do not have any pain with range of motion today.  She is in a wheelchair due to her fracture.    Review of Systems   Constitution: Negative. Negative for chills, fever and night sweats.   HENT: Negative for congestion and headaches.    Eyes: Negative for blurred vision, left vision loss and right vision loss.   Cardiovascular: Negative for chest pain and syncope.   Respiratory: Negative for cough and shortness of breath.    Endocrine: Negative for polydipsia, polyphagia and polyuria.   Hematologic/Lymphatic: Negative for bleeding problem. Does not bruise/bleed easily.   Skin: Negative for dry skin, itching and rash.   Musculoskeletal: Negative for falls. Positive for knee pain and muscle weakness.   Gastrointestinal: Negative for abdominal pain and bowel incontinence.   Genitourinary: Negative for bladder incontinence and nocturia.   Neurological: Negative for disturbances in coordination, loss of balance and seizures.   Psychiatric/Behavioral: Negative for depression. The patient does not have insomnia.    Allergic/Immunologic: Negative for hives and persistent infections.     PAST MEDICAL HISTORY:   Past Medical History:   Diagnosis Date    Anxiety and depression 2014    Colon polyps     Glaucoma     Hyperlipidemia     Hypertension     Keloid cicatrix     Obesity (BMI 30.0-34.9)      PAST SURGICAL HISTORY:   Past Surgical History:   Procedure Laterality Date     SECTION      x2     COLONOSCOPY N/A 8/27/2016    Performed by SHANNON Nj MD at [facility] (4TH FLR)     FAMILY HISTORY:   Family History   Problem Relation Age of Onset    Dementia Father     Breast cancer Sister     Cancer Paternal Grandmother     Cancer Brother     Glaucoma Maternal Uncle     Colon cancer Neg Hx     Ovarian cancer Neg Hx     Blindness Neg Hx     Macular degeneration Neg Hx     Retinal detachment Neg Hx      SOCIAL HISTORY:   Social History     Socioeconomic History    Marital status:      Spouse name: Not on file    Number of children: Not on file    Years of education: Not on file    Highest education level: Not on file   Occupational History    Occupation: Manager     Employer: [employer]   Social Needs    Financial resource strain: Not on file    Food insecurity:     Worry: Not on file     Inability: Not on file    Transportation needs:     Medical: No     Non-medical: No   Tobacco Use    Smoking status: Former Smoker    Smokeless tobacco: Never Used   Substance and Sexual Activity    Alcohol use: Yes     Frequency: 2-3 times a week     Drinks per session: 1 or 2     Comment: few times a week     Drug use: No    Sexual activity: Yes     Partners: Male     Birth control/protection: Post-menopausal   Lifestyle    Physical activity:     Days per week: Not on file     Minutes per session: Not on file    Stress: Only a little   Relationships    Social connections:     Talks on phone: Twice a week     Gets together: Once a week     Attends Shinto service: Not on file     Active member of club or organization: Not on file     Attends meetings of clubs or organizations: Not on file     Relationship status:    Other Topics Concern    Are you pregnant or think you may be? No    Breast-feeding No   Social History Narrative    Not on file       MEDICATIONS:   Current Outpatient Medications:     ascorbic acid, vitamin C, (VITAMIN C) 100 MG tablet, Take 100 mg by mouth 2  "(two) times daily. , Disp: , Rfl:     aspirin (ECOTRIN) 81 MG EC tablet, Take 81 mg by mouth once daily., Disp: , Rfl:     CALCIUM CARBONATE/VITAMIN D3 (VITAMIN D-3 ORAL), Take 1,000 mg by mouth once daily., Disp: , Rfl:     citalopram (CELEXA) 40 MG tablet, Take 1 tablet (40 mg total) by mouth once daily., Disp: 90 tablet, Rfl: 4    clobetasol 0.05% (TEMOVATE) 0.05 % Oint, AAA bid, Disp: 60 g, Rfl: 3    conjugated estrogens (PREMARIN) vaginal cream, 1/2 applicator vaginally twice weekly H.S., Disp: 45 g, Rfl: 12    fish oil-omega-3 fatty acids 300-1,000 mg capsule, Take 2 g by mouth once daily., Disp: , Rfl:     LORazepam (ATIVAN) 1 MG tablet, TAKE 1 TABLET (1 MG TOTAL) BY MOUTH EVERY EVENING., Disp: 30 tablet, Rfl: 5    ondansetron (ZOFRAN-ODT) 4 MG TbDL, Take 1 tablet (4 mg total) by mouth every 6 (six) hours as needed., Disp: 18 tablet, Rfl: 0    oxyCODONE-acetaminophen (PERCOCET) 5-325 mg per tablet, Take 1 tablet by mouth every 4 (four) hours as needed for Pain., Disp: 18 tablet, Rfl: 0    simvastatin (ZOCOR) 40 MG tablet, Take 1 tablet (40 mg total) by mouth once daily., Disp: 90 tablet, Rfl: 4    triamcinolone acetonide 0.1% (KENALOG) 0.1 % ointment, AAA bid, Disp: 454 g, Rfl: 3    urea 50 % Crea, Apply 1 application topically once daily. Apply to affected area, Disp: 255 g, Rfl: 3    latanoprost 0.005 % ophthalmic solution, Place 1 drop into both eyes every evening., Disp: 1 Bottle, Rfl: 5  No current facility-administered medications for this visit.   ALLERGIES:   Review of patient's allergies indicates:   Allergen Reactions    Sulfa (sulfonamide antibiotics)        VITAL SIGNS: /81   Pulse 73   Ht 5' 4" (1.626 m)   Wt 86.2 kg (190 lb)   BMI 32.61 kg/m²      PHYSICAL EXAMINATION  VITAL SIGNS: /81   Pulse 73   Ht 5' 4" (1.626 m)   Wt 86.2 kg (190 lb)   BMI 32.61 kg/m²    General:  The patient is alert and oriented x 3.  Mood is pleasant.  Observation of ears, eyes and nose " reveal no gross abnormalities.  HEENT: NCAT, sclera nonicteric  Lungs: Respirations are equal and unlabored.    Left lower extremity examination  5/5 EHL/FHL/TA/GS, 2+ DP/PT pulses, SILT in all distributions   No open injuries noted  Ecchymosis anteriorly over the knee  Straight leg raise is not intact    EXTREMITY NEURO-VASCULAR EXAMINATION:   Sensation:  Grossly intact to light touch all dermatomal regions.   Motor Function:  Fully intact motor function at hip, knee, foot and ankle    DTRs;  quadriceps and  achilles 2+.  No clonus and downgoing Babinski.    Vascular status:  DP and PT pulses 2+, brisk capillary refill, symmetric.     Other Findings:       X-rays:  CT scan: including standing, weight bearing AP and flexion bilateral knees, lateral and merchant views ordered and images reviewed by me show:  Transverse patella fracture at the distal pole with significant comminution noted in the inferior pole of the patella     ASSESSMENT:    Left transverse inferior pole patella fracture with significant comminution     PLAN: We have discussed the surgery and recovery of arthroscopic knee surgery. she understands that there may be limited weightbearing up to several weeks after surgery depending on procedures that are performed at the time of surgery.    The spectrum of treatment options were discussed with the patient, including nonoperative and operative options.  After thorough discussion, the patient has elected to undergo surgical treatment to include:  left   a.  Patella open versus closed reduction internal fixation, possible inferior pole excision and patellar tendon advancement, auto vs allograft, any other indicated procedures    The details of the surgical procedure were explained, including the location of probable incisions and a description of likely hardware and/or grafts to be used.  The patient understands the likely convalescence after surgery.  Also, we have thoroughly discussed the risks,  benefits and alternatives to surgery, including, but not limited to, the risk of infection, joint stiffness, blood clot (including DVT and/or pulmonary embolus), neurologic and vascular injury.  It was explained that, if tissue has been repaired or reconstructed, there is a chance of failure, which may require further management.

## 2019-09-16 NOTE — PROGRESS NOTES
Subjective:       Patient ID: Marielena Tracy is a 58 y.o. female.    Chief Complaint: Pre-op Exam (Left knee surgery September 24th )    HPI:  Patient fell and fractured her left kneecap yesterday.  She was seen in the emergency room and had an orthopedic evaluation.  Surgery is planned for next week.  She is immobilized.  She is here for preop.  Labs were done and were stable.  I have reviewed those in detail.  She denies any abnormal bleeding bruising or clotting.  No history of problems with anesthesia.  Did suggest she hold her aspirin and fish oil and any vitamins for 1 week prior.  She will run out of her pain medication on the weekend so I will give her another small supply to be filled then.  She is presently in a knee immobilizer.  She stable on her current meds not needing refills.    Review of Systems   Constitutional: Negative for appetite change, chills and fever.   HENT: Negative for nosebleeds, sinus pain and sore throat.    Eyes: Negative for visual disturbance.   Respiratory: Negative for cough, shortness of breath and wheezing.    Cardiovascular: Negative for chest pain and leg swelling.   Gastrointestinal: Negative for abdominal pain, constipation and diarrhea.   Genitourinary: Negative for difficulty urinating and hematuria.   Musculoskeletal: Positive for arthralgias, gait problem and joint swelling (Left knee). Negative for neck pain and neck stiffness.   Skin: Negative for pallor and rash.   Neurological: Negative for headaches.   Psychiatric/Behavioral: Negative for dysphoric mood and suicidal ideas. The patient is not nervous/anxious.        Objective:      Physical Exam   Constitutional: She is oriented to person, place, and time. She appears well-developed and well-nourished. No distress.   HENT:   Head: Normocephalic and atraumatic.   Mouth/Throat: Oropharynx is clear and moist. No oropharyngeal exudate.   Eyes: Pupils are equal, round, and reactive to light. Conjunctivae are  normal. No scleral icterus.   Neck: Normal range of motion. Neck supple. No thyromegaly present.   Cardiovascular: Normal rate, regular rhythm and intact distal pulses.   No murmur heard.  Pulmonary/Chest: Effort normal and breath sounds normal. She has no wheezes.   Abdominal: Soft. Bowel sounds are normal. She exhibits no distension. There is no tenderness.   Musculoskeletal: She exhibits tenderness and deformity (Swelling bruising and tenderness of the left knee cap area).   Lymphadenopathy:     She has no cervical adenopathy.   Neurological: She is alert and oriented to person, place, and time.   Skin: No rash noted.   Psychiatric: She has a normal mood and affect.       Assessment:       1. Preop cardiovascular exam    2. Displaced transverse fracture of left patella, initial encounter for closed fracture    3. Essential hypertension        Plan:       Marielena was seen today for pre-op exam.    Diagnoses and all orders for this visit:    Preop cardiovascular exam    Displaced transverse fracture of left patella, initial encounter for closed fracture  -     oxyCODONE-acetaminophen (PERCOCET) 5-325 mg per tablet; Take 1 tablet by mouth every 4 (four) hours as needed for Pain.  -     ondansetron (ZOFRAN-ODT) 4 MG TbDL; Take 1 tablet (4 mg total) by mouth every 6 (six) hours as needed.    Essential hypertension          Patient is cleared for anesthesia and surgery from an internal medicine standpoint.

## 2019-09-17 ENCOUNTER — TELEPHONE (OUTPATIENT)
Dept: INTERNAL MEDICINE | Facility: CLINIC | Age: 59
End: 2019-09-17

## 2019-09-17 DIAGNOSIS — S82.032A CLOSED DISPLACED TRANSVERSE FRACTURE OF LEFT PATELLA, INITIAL ENCOUNTER: Primary | ICD-10-CM

## 2019-09-17 NOTE — TELEPHONE ENCOUNTER
----- Message from Kiarra Garrido sent at 9/17/2019 10:06 AM CDT -----  Contact: Pt self Mobile/Home [phone]  Patient is returning a phone call.  Who left a message for the patient:   Does patient know what this is regarding:    Comments: Patient said she received a call this morning and she would like a call back please.

## 2019-09-18 ENCOUNTER — PATIENT OUTREACH (OUTPATIENT)
Dept: ADMINISTRATIVE | Facility: OTHER | Age: 59
End: 2019-09-18

## 2019-09-18 ENCOUNTER — TELEPHONE (OUTPATIENT)
Dept: SPORTS MEDICINE | Facility: CLINIC | Age: 59
End: 2019-09-18

## 2019-09-18 NOTE — TELEPHONE ENCOUNTER
Received disability and leave forms from [place]. Forms completed and faxed to them at [phone].    Colleen Amedee MA [facility]

## 2019-09-18 NOTE — PRE ADMISSION SCREENING
Anesthesiology {review:24506} Case Review for Triage    Planned Procedure: Procedure(s) (LRB):  ORIF, FRACTURE, PATELLA (Left)  REPAIR, TENDON, PATELLAR (Left) (Left  Left)  Requested Anesthesia Type: General  Surgeon: Danielle Cloud MD  Known or anticipated Date of Surgery: 2019    Accessible information regarding current medical status:  Surgeon notes: {surgeon notes:}  Anesthesia questionnaire completed by patient: {anes questionnaire:}  Previous anesthesia records: {prev anes:}  Last PCP note: {PCP note age:72406}  Subspecialty notes: {subspecialty note age:}, {subspecialty:}  Subspecialty evaluation: {subspecialty note:}  Records from recent hospitalization: {recent hospitalization:}  Outside medical records: {medical records:}  RN preliminary phone screening: {EARLY SCREENING CALL:}  Medication list: {MEDICATION LIST:}    Risk analysis from chart review  Planned surgery / procedure risk classification:  {surgery risk:27010}  Functional Capacity, (based on chart review): {functional capacity:04217}  Predicted ASA Classification: {ASA class:56022}  Cardiac Status: {risk analysis:17212}  Other important medical co-morbidities: {non-cardiac morbidity:77207}  Testing Status:  {testin}    Plan  Testing:  {test plan:25717}  Phone call:  {phone plan:18083}  Anesthesia Visit:  {ane plan:77962}   Visit focus:  {ane indications:79256}  Consult:  {consults:62506}  Indications:  {med consults:83849}  Sub-specialist consult:   {subspecialty:86375}  Indications:   {specialty consult:69454}    Grace Mena RN

## 2019-09-19 ENCOUNTER — PATIENT OUTREACH (OUTPATIENT)
Dept: ADMINISTRATIVE | Facility: OTHER | Age: 59
End: 2019-09-19

## 2019-09-19 ENCOUNTER — ANESTHESIA EVENT (OUTPATIENT)
Dept: SURGERY | Facility: HOSPITAL | Age: 59
End: 2019-09-19
Payer: COMMERCIAL

## 2019-09-19 NOTE — PRE ADMISSION SCREENING
Anesthesia Assessment: Preoperative EQUATION    Planned Procedure: Procedure(s) (LRB):  ORIF, FRACTURE, PATELLA (Left)  REPAIR, TENDON, PATELLAR (Left)  Requested Anesthesia Type:General  Surgeon: Danielle Cloud MD  Service: Orthopedics  Known or anticipated Date of Surgery:9/24/2019    Surgeon notes: reviewed    Electronic QUestionnaire Assessment completed via nurse interview with patient.      Triage considerations:     The patient has no apparent active cardiac condition (No unstable coronary Syndrome such as severe unstable angina or recent [<1 month] myocardial infarction, decompensated CHF, severe valvular   disease or significant arrhythmia)    Previous anesthesia records:LMA General and No problems 8/27/16 AT [facility]   FORMER SMOKER    Airway Findings: Mouth Opening: Normal Tongue: Normal  General Airway Assessment: Adult  Mallampati: II  TM Distance: Normal, at least 6 cm  Jaw/Neck Findings:  Neck ROM: Normal ROM       Last PCP note: within 1 month , outside [facility] 9/16   Subspecialty notes: ORTHO    Other important co-morbidities: HTN, HLD ANXIETY     Tests already available:  Available tests,  within 1 month , within [facility] .CBC,CMP.PT/INR EKG 9/15            Instructions given. (See in Nurse's note)    Optimization:  Anesthesia Preop Clinic Assessment NOT Indicated    Medical Opinion Indicated CLEARED        Navigation: Tests RESULTED

## 2019-09-19 NOTE — ANESTHESIA PREPROCEDURE EVALUATION
09/19/2019  Marielena Tracy is a 58 y.o., female.    Anesthesia Evaluation    I have reviewed the Patient Summary Reports.    I have reviewed the Nursing Notes.   I have reviewed the Medications.     Review of Systems  Anesthesia Hx:  No problems with previous Anesthesia  History of prior surgery of interest to airway management or planning: Previous anesthesia: General, MAC reported in [facility] with general anesthesia.   2016 coloscopy as reported in [facility]  with MAC.  Denies Family Hx of Anesthesia complications.   Denies Personal Hx of Anesthesia complications.   Social:  Social Alcohol Use, Former Smoker  Alcohol Use: Pt consumes 2 drinks a week social ,    Hematology/Oncology:  Hematology Normal   Oncology Normal     EENT/Dental:  EENT/Dental Normal Denies Active Dental Problems  Denies Jaw Problems   Cardiovascular:   Exercise tolerance: good Hypertension hyperlipidemia  Functional Capacity 3 METS  Hypertension    Pulmonary:  Pulmonary Normal  Denies Shortness of breath.  Denies Recent URI.  Denies Sleep Apnea.    Renal/:  Renal/ Normal     Hepatic/GI:  Hepatic/GI Normal    Musculoskeletal:  Musculoskeletal Normal Patella fx Musculoskeletal General/Symptoms: joint immobility, limited ROM.    Neurological:  Neurology Normal    Endocrine:  Endocrine Normal    Dermatological:  Skin Normal    Psych:   anxiety depression  Anxiety Disorder. Take prn ativan as prescribed         Physical Exam  General:  Well nourished, Obesity    Airway/Jaw/Neck:  Airway Findings: Mouth Opening: Small, but > 3cm Tongue: Normal  General Airway Assessment: Adult, Average  Mallampati: III  Improves to II with phonation.  TM Distance: 4 - 6 cm        Eyes/Ears/Nose:  EYES/EARS/NOSE FINDINGS: Normal   Dental:  Dental Findings: In tact, Upper front caps   Chest/Lungs:  Chest/Lungs Findings: Clear to auscultation, Normal  Respiratory Rate     Heart/Vascular:  Heart Findings: Rate: Normal  Rhythm: Regular Rhythm  Sounds: Normal  Heart murmur: negative Vascular Findings: Normal    Abdomen:  Abdomen Findings: Normal    Musculoskeletal:  Musculoskeletal Findings: Normal   Skin:  Skin Findings: Normal    Mental Status:  Mental Status Findings:  Cooperative, Alert and Oriented         Anesthesia Plan  Type of Anesthesia, risks & benefits discussed:  Anesthesia Type:  general  Patient's Preference:   Intra-op Monitoring Plan: standard ASA monitors  Intra-op Monitoring Plan Comments:   Post Op Pain Control Plan: peripheral nerve block  Post Op Pain Control Plan Comments:   Induction:   IV  Beta Blocker:  Patient is not currently on a Beta-Blocker (No further documentation required).       Informed Consent: Patient understands risks and agrees with Anesthesia plan.  Questions answered. Anesthesia consent signed with patient.  ASA Score: 2     Day of Surgery Review of History & Physical:            Ready For Surgery From Anesthesia Perspective.

## 2019-09-23 ENCOUNTER — TELEPHONE (OUTPATIENT)
Dept: SPORTS MEDICINE | Facility: CLINIC | Age: 59
End: 2019-09-23

## 2019-09-23 ENCOUNTER — OFFICE VISIT (OUTPATIENT)
Dept: SPORTS MEDICINE | Facility: CLINIC | Age: 59
End: 2019-09-23
Payer: COMMERCIAL

## 2019-09-23 VITALS
HEIGHT: 64 IN | DIASTOLIC BLOOD PRESSURE: 74 MMHG | BODY MASS INDEX: 32.78 KG/M2 | SYSTOLIC BLOOD PRESSURE: 116 MMHG | HEART RATE: 78 BPM | WEIGHT: 192 LBS

## 2019-09-23 DIAGNOSIS — G89.18 POST-OPERATIVE PAIN: ICD-10-CM

## 2019-09-23 DIAGNOSIS — S82.032A CLOSED DISPLACED TRANSVERSE FRACTURE OF LEFT PATELLA, INITIAL ENCOUNTER: Primary | ICD-10-CM

## 2019-09-23 DIAGNOSIS — S82.032A DISPLACED TRANSVERSE FRACTURE OF LEFT PATELLA, INITIAL ENCOUNTER FOR CLOSED FRACTURE: ICD-10-CM

## 2019-09-23 PROCEDURE — 99499 UNLISTED E&M SERVICE: CPT | Mod: S$GLB,,, | Performed by: PHYSICIAN ASSISTANT

## 2019-09-23 PROCEDURE — 99499 NO LOS: ICD-10-PCS | Mod: S$GLB,,, | Performed by: PHYSICIAN ASSISTANT

## 2019-09-23 PROCEDURE — 99999 PR PBB SHADOW E&M-EST. PATIENT-LVL IV: CPT | Mod: PBBFAC,,, | Performed by: PHYSICIAN ASSISTANT

## 2019-09-23 PROCEDURE — 99999 PR PBB SHADOW E&M-EST. PATIENT-LVL IV: ICD-10-PCS | Mod: PBBFAC,,, | Performed by: PHYSICIAN ASSISTANT

## 2019-09-23 RX ORDER — PREGABALIN 25 MG/1
75 CAPSULE ORAL
Status: CANCELLED | OUTPATIENT
Start: 2019-09-23 | End: 2019-09-23

## 2019-09-23 RX ORDER — SODIUM CHLORIDE 9 MG/ML
INJECTION, SOLUTION INTRAVENOUS CONTINUOUS
Status: CANCELLED | OUTPATIENT
Start: 2019-09-23

## 2019-09-23 RX ORDER — TRAMADOL HYDROCHLORIDE 50 MG/1
50-100 TABLET ORAL EVERY 6 HOURS PRN
Qty: 21 TABLET | Refills: 0 | Status: SHIPPED | OUTPATIENT
Start: 2019-09-23 | End: 2019-09-30 | Stop reason: SDUPTHER

## 2019-09-23 RX ORDER — OXYCODONE AND ACETAMINOPHEN 10; 325 MG/1; MG/1
TABLET ORAL
Qty: 21 TABLET | Refills: 0 | Status: SHIPPED | OUTPATIENT
Start: 2019-09-23 | End: 2019-09-30 | Stop reason: SDUPTHER

## 2019-09-23 NOTE — H&P
Marielena Tracy  is here for a completion of her perioperative paperwork. she  Is scheduled to undergo     left              a.  Patella open versus closed reduction internal fixation, possible inferior pole excision and patellar tendon advancement, auto vs allograft, any other indicated procedures on 19.      She is a healthy individual BUT does need clearance for this procedure which she has received from Dr. Gregory.     PAST MEDICAL HISTORY:   Past Medical History:   Diagnosis Date    Anxiety and depression 2014    Colon polyps     Glaucoma     Hyperlipidemia     Hypertension     Keloid cicatrix     Obesity (BMI 30.0-34.9)      PAST SURGICAL HISTORY:   Past Surgical History:   Procedure Laterality Date     SECTION      x2    COLONOSCOPY N/A 2016    Performed by SHANNON Nj MD at [facility] (4TH FLR)     FAMILY HISTORY:   Family History   Problem Relation Age of Onset    Dementia Father     Breast cancer Sister     Cancer Paternal Grandmother     Cancer Brother     Glaucoma Maternal Uncle     Colon cancer Neg Hx     Ovarian cancer Neg Hx     Blindness Neg Hx     Macular degeneration Neg Hx     Retinal detachment Neg Hx      SOCIAL HISTORY:   Social History     Socioeconomic History    Marital status:      Spouse name: Not on file    Number of children: Not on file    Years of education: Not on file    Highest education level: Not on file   Occupational History    Occupation: Manager     Employer: [employer]   Social Needs    Financial resource strain: Not on file    Food insecurity:     Worry: Not on file     Inability: Not on file    Transportation needs:     Medical: No     Non-medical: No   Tobacco Use    Smoking status: Former Smoker    Smokeless tobacco: Never Used   Substance and Sexual Activity    Alcohol use: Yes     Frequency: 2-3 times a week     Drinks per session: 1 or 2     Comment: few times a week     Drug use: No    Sexual  activity: Yes     Partners: Male     Birth control/protection: Post-menopausal   Lifestyle    Physical activity:     Days per week: Not on file     Minutes per session: Not on file    Stress: Only a little   Relationships    Social connections:     Talks on phone: Twice a week     Gets together: Once a week     Attends Spiritism service: Not on file     Active member of club or organization: Not on file     Attends meetings of clubs or organizations: Not on file     Relationship status:    Other Topics Concern    Are you pregnant or think you may be? No    Breast-feeding No   Social History Narrative    Not on file       MEDICATIONS:   Current Outpatient Medications:     citalopram (CELEXA) 40 MG tablet, Take 1 tablet (40 mg total) by mouth once daily., Disp: 90 tablet, Rfl: 4    clobetasol 0.05% (TEMOVATE) 0.05 % Oint, AAA bid, Disp: 60 g, Rfl: 3    LORazepam (ATIVAN) 1 MG tablet, TAKE 1 TABLET (1 MG TOTAL) BY MOUTH EVERY EVENING., Disp: 30 tablet, Rfl: 5    ondansetron (ZOFRAN-ODT) 4 MG TbDL, Take 1 tablet (4 mg total) by mouth every 6 (six) hours as needed., Disp: 18 tablet, Rfl: 0    simvastatin (ZOCOR) 40 MG tablet, Take 1 tablet (40 mg total) by mouth once daily., Disp: 90 tablet, Rfl: 4    triamcinolone acetonide 0.1% (KENALOG) 0.1 % ointment, AAA bid, Disp: 454 g, Rfl: 3    ascorbic acid, vitamin C, (VITAMIN C) 100 MG tablet, Take 100 mg by mouth 2 (two) times daily. , Disp: , Rfl:     aspirin (ECOTRIN) 81 MG EC tablet, Take 81 mg by mouth once daily., Disp: , Rfl:     CALCIUM CARBONATE/VITAMIN D3 (VITAMIN D-3 ORAL), Take 1,000 mg by mouth once daily., Disp: , Rfl:     conjugated estrogens (PREMARIN) vaginal cream, 1/2 applicator vaginally twice weekly H.S., Disp: 45 g, Rfl: 12    fish oil-omega-3 fatty acids 300-1,000 mg capsule, Take 2 g by mouth once daily., Disp: , Rfl:     latanoprost 0.005 % ophthalmic solution, Place 1 drop into both eyes every evening., Disp: 1 Bottle, Rfl:  "5    oxyCODONE-acetaminophen (PERCOCET)  mg per tablet, Take 1 tablet by mouth every 4-6 hours as needed for pain. Take stool softener with this medication., Disp: 21 tablet, Rfl: 0    traMADol (ULTRAM) 50 mg tablet, Take 1-2 tablets ( mg total) by mouth every 6 (six) hours as needed., Disp: 21 tablet, Rfl: 0    urea 50 % Crea, Apply 1 application topically once daily. Apply to affected area, Disp: 255 g, Rfl: 3  ALLERGIES:   Review of patient's allergies indicates:   Allergen Reactions    Sulfa (sulfonamide antibiotics)        VITAL SIGNS: /74   Pulse 78   Ht 5' 4" (1.626 m)   Wt 87.1 kg (192 lb)   BMI 32.96 kg/m²      Risks, indications and benefits of the surgical procedure were discussed with the patient. All questions with regard to surgery, rehab, expected return to functional activities, activities of daily living and recreational endeavors were answered to her satisfaction.    It was explained to the patient that there may be an increase in surgical risks if the patient has certain co-morbidities such as but not limited to: Obesity, Cardiovascular issues (CHF, CAD, Arrhythmias), chronic pulmonary issues, previous or current neurovascular/neurological issues, previous strokes, diabetes mellitus, previous wound healing issues, previous wound or skin infections, PVD, clotting disorders, if the patient uses chronic steroids, if the patient takes or has immune compromising medications or diseases, or has previously or currently used tobacco products.     The patient verbalized that he/she does not have any additional clotting, bleeding, or blood disorders, other than what is list in her chart on today's review.     Then a brief history and physical exam were performed.    Review of Systems   Constitution: Negative. Negative for chills, fever and night sweats.   HENT: Negative for congestion and headaches.    Eyes: Negative for blurred vision, left vision loss and right vision loss. "   Cardiovascular: Negative for chest pain and syncope.   Respiratory: Negative for cough and shortness of breath.    Endocrine: Negative for polydipsia, polyphagia and polyuria.   Hematologic/Lymphatic: Negative for bleeding problem. Does not bruise/bleed easily.   Skin: Negative for dry skin, itching and rash.   Musculoskeletal: Negative for falls and muscle weakness.   Gastrointestinal: Negative for abdominal pain and bowel incontinence.   Genitourinary: Negative for bladder incontinence and nocturia.   Neurological: Negative for disturbances in coordination, loss of balance and seizures.   Psychiatric/Behavioral: Negative for depression. The patient does not have insomnia.    Allergic/Immunologic: Negative for hives and persistent infections.     PHYSICAL EXAM:  GEN: A&Ox3, WD WN NAD  HEENT: WNL  CHEST: CTAB, no W/R/R  HEART: RRR, no M/R/G  ABD: Soft, NT ND, BS x4 QUADS  MS; See Epic  NEURO: CN II-XII intact       The surgical consent was then reviewed with the patient, who agreed with all the contents of the consent form and it was signed. she was then given the [facility] surgery packet to bring with her to [facility] for the anesthesia portion of her perioperative paperwork.   For all physicians except for Dr. Wright, we will email and possibly fax the consent forms and booking sheets to [facility] pre-admit.    The patient was given the opportunity to ask questions about the surgical plan and consent form, and once no other questions were asked, I proceeded with the pre-op appointment.    PHYSICAL THERAPY:  She was also instructed regarding physical therapy and will begin on  2 weeks post-op. She was given a copy of the original prescription to schedule. Another copy of this prescription was also faxed to [facility].    POST OP CARE:instructions were reviewed including care of the wound and dressing after surgery and when she can shower.     PAIN MANAGEMENT: Marielena Tracy was also given her  pain management regime, which includes the TENS unit given to her by lisa along with the education required for its use. She was also instructed regarding the Polar ice unit that will be in place after surgery and her postoperative pain medications.     PAIN MEDICATION:  Percocet 10/325mg 1 po q 4-6 hours prn pain  Ultram 50 mg Take 1-2 p.o. q.6 hours p.r.n. breakthrough pain,   Phenergan 25 mg one p.o. q.6 hours p.r.n. nausea and vomiting.    DVT prophylaxis was discussed with the patient today including risk factors for developing DVTs and history of DVTs. The patient was asked if any specific recommendations were given from the doctor/s that did pre-operative surgical clearance. The patient was then given an education sheet about DVTs and PE with warning signs and symptoms of both and steps to take if they suspect either of these.    This along with the Modified Caprini risk assessment model for VTE in general surgical patients was used to determine the patient's DVT risk.     From: Vida HUERTA, Ever DA, Emily SM, et al. Prevention of VTE in nonorthopedic surgical patients: antithrombotic therapy and prevention of thrombosis, 9th ed: American College of Chest Physicians evidence-based clinical practical guidelines. Chest 2012; 141:e227S. Copyright © 2012. Reproduced with permission from the American College of Chest Physicians.    The below listed DVT prophylaxis regimen along with bilateral JEREMY compression stockings will be used post-op. Length of treatment has been determined to be 10-42 days post-op by the above noted Caprini assessment model.     The patient was instructed to buy and take:  Aspirin 81mg QD x 8 weeks for DVT prophylaxis starting on the morning after surgery.  Patient will also use bilateral TEDs on lower extremities, SCDs during surgery, and early ambulation post-op. If the patient was previously taking 81mg baby aspirin, they were told to not take it starting 5 days prior to surgery and to  restart the 81mg aspirin after surgery.       Patient was also told to buy over the counter Prilosec medication if needed and take it once daily for GI protection as long as they are taking NSAIDs or Aspirin.    Patient denies history of seizures.     Patient was asked if they were taking or using OCP pills or devices. If they answered yes, then they were instructed to stop using OCPs at this pre-operative appointment until 2 months post-op to help prevent DVT development. They understand that there are other forms of birth control that do not involve hormones. They expressed understanding that ignoring/not following this instruction could result in a DVT which could turn into a deadly pulmonary embolism.       The patient was told that narcotic pain medications may make them drowsy and instructions were given to not sign legal documents, drive or operate heavy machinery, cars, or equipment while under the influence of narcotic medications. The patient was told and understands that narcotic pain medications should only be used as needed to control pain and that other options of pain control include TENs unit and ice packs/unit.     As there were no other questions to be asked, she was given my business card along with Danielle Cloud MD business card if she has any questions or concerns prior to surgery or in the postop period.

## 2019-09-23 NOTE — H&P (VIEW-ONLY)
Marielena Tracy  is here for a completion of her perioperative paperwork. she  Is scheduled to undergo     left              a.  Patella open versus closed reduction internal fixation, possible inferior pole excision and patellar tendon advancement, auto vs allograft, any other indicated procedures on 19.      She is a healthy individual BUT does need clearance for this procedure which she has received from Dr. Gregory.     PAST MEDICAL HISTORY:   Past Medical History:   Diagnosis Date    Anxiety and depression 2014    Colon polyps     Glaucoma     Hyperlipidemia     Hypertension     Keloid cicatrix     Obesity (BMI 30.0-34.9)      PAST SURGICAL HISTORY:   Past Surgical History:   Procedure Laterality Date     SECTION      x2    COLONOSCOPY N/A 2016    Performed by SHANNON Nj MD at [facility] (4TH FLR)     FAMILY HISTORY:   Family History   Problem Relation Age of Onset    Dementia Father     Breast cancer Sister     Cancer Paternal Grandmother     Cancer Brother     Glaucoma Maternal Uncle     Colon cancer Neg Hx     Ovarian cancer Neg Hx     Blindness Neg Hx     Macular degeneration Neg Hx     Retinal detachment Neg Hx      SOCIAL HISTORY:   Social History     Socioeconomic History    Marital status:      Spouse name: Not on file    Number of children: Not on file    Years of education: Not on file    Highest education level: Not on file   Occupational History    Occupation: Manager     Employer: [employer]   Social Needs    Financial resource strain: Not on file    Food insecurity:     Worry: Not on file     Inability: Not on file    Transportation needs:     Medical: No     Non-medical: No   Tobacco Use    Smoking status: Former Smoker    Smokeless tobacco: Never Used   Substance and Sexual Activity    Alcohol use: Yes     Frequency: 2-3 times a week     Drinks per session: 1 or 2     Comment: few times a week     Drug use: No    Sexual  activity: Yes     Partners: Male     Birth control/protection: Post-menopausal   Lifestyle    Physical activity:     Days per week: Not on file     Minutes per session: Not on file    Stress: Only a little   Relationships    Social connections:     Talks on phone: Twice a week     Gets together: Once a week     Attends Sabianism service: Not on file     Active member of club or organization: Not on file     Attends meetings of clubs or organizations: Not on file     Relationship status:    Other Topics Concern    Are you pregnant or think you may be? No    Breast-feeding No   Social History Narrative    Not on file       MEDICATIONS:   Current Outpatient Medications:     citalopram (CELEXA) 40 MG tablet, Take 1 tablet (40 mg total) by mouth once daily., Disp: 90 tablet, Rfl: 4    clobetasol 0.05% (TEMOVATE) 0.05 % Oint, AAA bid, Disp: 60 g, Rfl: 3    LORazepam (ATIVAN) 1 MG tablet, TAKE 1 TABLET (1 MG TOTAL) BY MOUTH EVERY EVENING., Disp: 30 tablet, Rfl: 5    ondansetron (ZOFRAN-ODT) 4 MG TbDL, Take 1 tablet (4 mg total) by mouth every 6 (six) hours as needed., Disp: 18 tablet, Rfl: 0    simvastatin (ZOCOR) 40 MG tablet, Take 1 tablet (40 mg total) by mouth once daily., Disp: 90 tablet, Rfl: 4    triamcinolone acetonide 0.1% (KENALOG) 0.1 % ointment, AAA bid, Disp: 454 g, Rfl: 3    ascorbic acid, vitamin C, (VITAMIN C) 100 MG tablet, Take 100 mg by mouth 2 (two) times daily. , Disp: , Rfl:     aspirin (ECOTRIN) 81 MG EC tablet, Take 81 mg by mouth once daily., Disp: , Rfl:     CALCIUM CARBONATE/VITAMIN D3 (VITAMIN D-3 ORAL), Take 1,000 mg by mouth once daily., Disp: , Rfl:     conjugated estrogens (PREMARIN) vaginal cream, 1/2 applicator vaginally twice weekly H.S., Disp: 45 g, Rfl: 12    fish oil-omega-3 fatty acids 300-1,000 mg capsule, Take 2 g by mouth once daily., Disp: , Rfl:     latanoprost 0.005 % ophthalmic solution, Place 1 drop into both eyes every evening., Disp: 1 Bottle, Rfl:  "5    oxyCODONE-acetaminophen (PERCOCET)  mg per tablet, Take 1 tablet by mouth every 4-6 hours as needed for pain. Take stool softener with this medication., Disp: 21 tablet, Rfl: 0    traMADol (ULTRAM) 50 mg tablet, Take 1-2 tablets ( mg total) by mouth every 6 (six) hours as needed., Disp: 21 tablet, Rfl: 0    urea 50 % Crea, Apply 1 application topically once daily. Apply to affected area, Disp: 255 g, Rfl: 3  ALLERGIES:   Review of patient's allergies indicates:   Allergen Reactions    Sulfa (sulfonamide antibiotics)        VITAL SIGNS: /74   Pulse 78   Ht 5' 4" (1.626 m)   Wt 87.1 kg (192 lb)   BMI 32.96 kg/m²      Risks, indications and benefits of the surgical procedure were discussed with the patient. All questions with regard to surgery, rehab, expected return to functional activities, activities of daily living and recreational endeavors were answered to her satisfaction.    It was explained to the patient that there may be an increase in surgical risks if the patient has certain co-morbidities such as but not limited to: Obesity, Cardiovascular issues (CHF, CAD, Arrhythmias), chronic pulmonary issues, previous or current neurovascular/neurological issues, previous strokes, diabetes mellitus, previous wound healing issues, previous wound or skin infections, PVD, clotting disorders, if the patient uses chronic steroids, if the patient takes or has immune compromising medications or diseases, or has previously or currently used tobacco products.     The patient verbalized that he/she does not have any additional clotting, bleeding, or blood disorders, other than what is list in her chart on today's review.     Then a brief history and physical exam were performed.    Review of Systems   Constitution: Negative. Negative for chills, fever and night sweats.   HENT: Negative for congestion and headaches.    Eyes: Negative for blurred vision, left vision loss and right vision loss. "   Cardiovascular: Negative for chest pain and syncope.   Respiratory: Negative for cough and shortness of breath.    Endocrine: Negative for polydipsia, polyphagia and polyuria.   Hematologic/Lymphatic: Negative for bleeding problem. Does not bruise/bleed easily.   Skin: Negative for dry skin, itching and rash.   Musculoskeletal: Negative for falls and muscle weakness.   Gastrointestinal: Negative for abdominal pain and bowel incontinence.   Genitourinary: Negative for bladder incontinence and nocturia.   Neurological: Negative for disturbances in coordination, loss of balance and seizures.   Psychiatric/Behavioral: Negative for depression. The patient does not have insomnia.    Allergic/Immunologic: Negative for hives and persistent infections.     PHYSICAL EXAM:  GEN: A&Ox3, WD WN NAD  HEENT: WNL  CHEST: CTAB, no W/R/R  HEART: RRR, no M/R/G  ABD: Soft, NT ND, BS x4 QUADS  MS; See Epic  NEURO: CN II-XII intact       The surgical consent was then reviewed with the patient, who agreed with all the contents of the consent form and it was signed. she was then given the [facility] surgery packet to bring with her to [facility] for the anesthesia portion of her perioperative paperwork.   For all physicians except for Dr. Wright, we will email and possibly fax the consent forms and booking sheets to [facility] pre-admit.    The patient was given the opportunity to ask questions about the surgical plan and consent form, and once no other questions were asked, I proceeded with the pre-op appointment.    PHYSICAL THERAPY:  She was also instructed regarding physical therapy and will begin on  2 weeks post-op. She was given a copy of the original prescription to schedule. Another copy of this prescription was also faxed to [facility].    POST OP CARE:instructions were reviewed including care of the wound and dressing after surgery and when she can shower.     PAIN MANAGEMENT: Marielena Tracy was also given her  pain management regime, which includes the TENS unit given to her by lisa along with the education required for its use. She was also instructed regarding the Polar ice unit that will be in place after surgery and her postoperative pain medications.     PAIN MEDICATION:  Percocet 10/325mg 1 po q 4-6 hours prn pain  Ultram 50 mg Take 1-2 p.o. q.6 hours p.r.n. breakthrough pain,   Phenergan 25 mg one p.o. q.6 hours p.r.n. nausea and vomiting.    DVT prophylaxis was discussed with the patient today including risk factors for developing DVTs and history of DVTs. The patient was asked if any specific recommendations were given from the doctor/s that did pre-operative surgical clearance. The patient was then given an education sheet about DVTs and PE with warning signs and symptoms of both and steps to take if they suspect either of these.    This along with the Modified Caprini risk assessment model for VTE in general surgical patients was used to determine the patient's DVT risk.     From: Vida HUERTA, Ever DA, Emily SM, et al. Prevention of VTE in nonorthopedic surgical patients: antithrombotic therapy and prevention of thrombosis, 9th ed: American College of Chest Physicians evidence-based clinical practical guidelines. Chest 2012; 141:e227S. Copyright © 2012. Reproduced with permission from the American College of Chest Physicians.    The below listed DVT prophylaxis regimen along with bilateral JEREMY compression stockings will be used post-op. Length of treatment has been determined to be 10-42 days post-op by the above noted Caprini assessment model.     The patient was instructed to buy and take:  Aspirin 81mg QD x 8 weeks for DVT prophylaxis starting on the morning after surgery.  Patient will also use bilateral TEDs on lower extremities, SCDs during surgery, and early ambulation post-op. If the patient was previously taking 81mg baby aspirin, they were told to not take it starting 5 days prior to surgery and to  restart the 81mg aspirin after surgery.       Patient was also told to buy over the counter Prilosec medication if needed and take it once daily for GI protection as long as they are taking NSAIDs or Aspirin.    Patient denies history of seizures.     Patient was asked if they were taking or using OCP pills or devices. If they answered yes, then they were instructed to stop using OCPs at this pre-operative appointment until 2 months post-op to help prevent DVT development. They understand that there are other forms of birth control that do not involve hormones. They expressed understanding that ignoring/not following this instruction could result in a DVT which could turn into a deadly pulmonary embolism.       The patient was told that narcotic pain medications may make them drowsy and instructions were given to not sign legal documents, drive or operate heavy machinery, cars, or equipment while under the influence of narcotic medications. The patient was told and understands that narcotic pain medications should only be used as needed to control pain and that other options of pain control include TENs unit and ice packs/unit.     As there were no other questions to be asked, she was given my business card along with Danielle Cloud MD business card if she has any questions or concerns prior to surgery or in the postop period.

## 2019-09-23 NOTE — TELEPHONE ENCOUNTER
----- Message from Debra Kaminski MA sent at 9/23/2019  1:33 PM CDT -----  Contact: PTs       ----- Message -----  From: Julissa Conde  Sent: 9/23/2019  12:42 PM CDT  To: Martha Tong Staff     called to say that the paperwork that was given regarding ordering a Pain Management device from ([employer]) is incorrect... The fax number on that paperwork is out of date.    Pt called the company and was given a NEW fax number.  PH: [phone]  FX: [phone]    Please resend the order over to [employer] for the device    PT callback: [phone] (Bakari Tracy - )

## 2019-09-24 ENCOUNTER — ANESTHESIA (OUTPATIENT)
Dept: SURGERY | Facility: HOSPITAL | Age: 59
End: 2019-09-24
Payer: COMMERCIAL

## 2019-09-24 ENCOUNTER — HOSPITAL ENCOUNTER (OUTPATIENT)
Facility: HOSPITAL | Age: 59
Discharge: HOME | End: 2019-09-24
Attending: ORTHOPAEDIC SURGERY | Admitting: ORTHOPAEDIC SURGERY
Payer: COMMERCIAL

## 2019-09-24 DIAGNOSIS — S82.009A PATELLA FRACTURE: ICD-10-CM

## 2019-09-24 DIAGNOSIS — S82.032A DISPLACED TRANSVERSE FRACTURE OF LEFT PATELLA, INITIAL ENCOUNTER FOR CLOSED FRACTURE: ICD-10-CM

## 2019-09-24 DIAGNOSIS — Z98.890 S/P ORIF (OPEN REDUCTION INTERNAL FIXATION) FRACTURE: Primary | ICD-10-CM

## 2019-09-24 DIAGNOSIS — Z87.81 S/P ORIF (OPEN REDUCTION INTERNAL FIXATION) FRACTURE: Primary | ICD-10-CM

## 2019-09-24 DIAGNOSIS — S82.032A CLOSED DISPLACED TRANSVERSE FRACTURE OF LEFT PATELLA, INITIAL ENCOUNTER: Primary | ICD-10-CM

## 2019-09-24 PROCEDURE — 27201423 OPTIME MED/SURG SUP & DEVICES STERILE SUPPLY: Mod: PO | Performed by: ORTHOPAEDIC SURGERY

## 2019-09-24 PROCEDURE — 27428 RECONSTRUCTION KNEE: CPT | Mod: LT,,, | Performed by: ORTHOPAEDIC SURGERY

## 2019-09-24 PROCEDURE — 94760 N-INVAS EAR/PLS OXIMETRY 1: CPT | Mod: PO

## 2019-09-24 PROCEDURE — 25000003 PHARM REV CODE 250: Mod: PO | Performed by: ANESTHESIOLOGY

## 2019-09-24 PROCEDURE — 63600175 PHARM REV CODE 636 W HCPCS: Mod: PO | Performed by: ANESTHESIOLOGY

## 2019-09-24 PROCEDURE — 27380 REPAIR OF KNEECAP TENDON: CPT | Mod: 51,22,LT, | Performed by: ORTHOPAEDIC SURGERY

## 2019-09-24 PROCEDURE — D9220A PRA ANESTHESIA: ICD-10-PCS | Mod: CRNA,,, | Performed by: NURSE ANESTHETIST, CERTIFIED REGISTERED

## 2019-09-24 PROCEDURE — 36000710: Mod: PO | Performed by: ORTHOPAEDIC SURGERY

## 2019-09-24 PROCEDURE — 71000039 HC RECOVERY, EACH ADD'L HOUR: Mod: PO | Performed by: ORTHOPAEDIC SURGERY

## 2019-09-24 PROCEDURE — 36000711: Mod: PO | Performed by: ORTHOPAEDIC SURGERY

## 2019-09-24 PROCEDURE — 25000003 PHARM REV CODE 250: Mod: PO | Performed by: NURSE ANESTHETIST, CERTIFIED REGISTERED

## 2019-09-24 PROCEDURE — 63600175 PHARM REV CODE 636 W HCPCS: Mod: PO | Performed by: ORTHOPAEDIC SURGERY

## 2019-09-24 PROCEDURE — D9220A PRA ANESTHESIA: Mod: CRNA,,, | Performed by: NURSE ANESTHETIST, CERTIFIED REGISTERED

## 2019-09-24 PROCEDURE — 64447 PR NERVE BLOCK INJ, ANES/STEROID, FEMORAL, INCL IMAG GUIDANCE: ICD-10-PCS | Mod: 59,LT,, | Performed by: ANESTHESIOLOGY

## 2019-09-24 PROCEDURE — 27200750 HC INSULATED NEEDLE/ STIMUPLEX

## 2019-09-24 PROCEDURE — 63600175 PHARM REV CODE 636 W HCPCS: Mod: PO | Performed by: NURSE ANESTHETIST, CERTIFIED REGISTERED

## 2019-09-24 PROCEDURE — 27380 PR FIX INFRAPATELLA TENDON,PRIMARY: ICD-10-PCS | Mod: 51,22,LT, | Performed by: ORTHOPAEDIC SURGERY

## 2019-09-24 PROCEDURE — 71000033 HC RECOVERY, INTIAL HOUR: Mod: PO | Performed by: ORTHOPAEDIC SURGERY

## 2019-09-24 PROCEDURE — 37000008 HC ANESTHESIA 1ST 15 MINUTES: Mod: PO | Performed by: ORTHOPAEDIC SURGERY

## 2019-09-24 PROCEDURE — 25000003 PHARM REV CODE 250: Mod: PO | Performed by: PHYSICIAN ASSISTANT

## 2019-09-24 PROCEDURE — S0020 INJECTION, BUPIVICAINE HYDRO: HCPCS | Mod: PO | Performed by: ANESTHESIOLOGY

## 2019-09-24 PROCEDURE — 76942 ECHO GUIDE FOR BIOPSY: CPT | Mod: 26,,, | Performed by: ANESTHESIOLOGY

## 2019-09-24 PROCEDURE — 76942 PR U/S GUIDANCE FOR NEEDLE GUIDANCE: ICD-10-PCS | Mod: 26,,, | Performed by: ANESTHESIOLOGY

## 2019-09-24 PROCEDURE — 63600175 PHARM REV CODE 636 W HCPCS: Mod: PO | Performed by: PHYSICIAN ASSISTANT

## 2019-09-24 PROCEDURE — 64447 NJX AA&/STRD FEMORAL NRV IMG: CPT | Mod: PO | Performed by: ANESTHESIOLOGY

## 2019-09-24 PROCEDURE — 27428 PR LIGMT REVISION,KNEE,INTRA-ARTIC: ICD-10-PCS | Mod: LT,,, | Performed by: ORTHOPAEDIC SURGERY

## 2019-09-24 PROCEDURE — 64447 NJX AA&/STRD FEMORAL NRV IMG: CPT | Mod: 59,LT,, | Performed by: ANESTHESIOLOGY

## 2019-09-24 PROCEDURE — 25000003 PHARM REV CODE 250: Mod: PO | Performed by: ORTHOPAEDIC SURGERY

## 2019-09-24 PROCEDURE — D9220A PRA ANESTHESIA: Mod: ANES,,, | Performed by: ANESTHESIOLOGY

## 2019-09-24 PROCEDURE — 37000009 HC ANESTHESIA EA ADD 15 MINS: Mod: PO | Performed by: ORTHOPAEDIC SURGERY

## 2019-09-24 PROCEDURE — D9220A PRA ANESTHESIA: ICD-10-PCS | Mod: ANES,,, | Performed by: ANESTHESIOLOGY

## 2019-09-24 RX ORDER — PROPOFOL 10 MG/ML
VIAL (ML) INTRAVENOUS CONTINUOUS PRN
Status: DISCONTINUED | OUTPATIENT
Start: 2019-09-24 | End: 2019-09-24

## 2019-09-24 RX ORDER — SODIUM CHLORIDE 9 MG/ML
INJECTION, SOLUTION INTRAVENOUS CONTINUOUS
Status: DISCONTINUED | OUTPATIENT
Start: 2019-09-24 | End: 2019-09-24 | Stop reason: HOSPADM

## 2019-09-24 RX ORDER — OXYCODONE HYDROCHLORIDE 5 MG/1
10 TABLET ORAL EVERY 4 HOURS PRN
Status: DISCONTINUED | OUTPATIENT
Start: 2019-09-24 | End: 2019-09-24 | Stop reason: HOSPADM

## 2019-09-24 RX ORDER — MORPHINE SULFATE 2 MG/ML
2 INJECTION, SOLUTION INTRAMUSCULAR; INTRAVENOUS EVERY 10 MIN PRN
Status: DISCONTINUED | OUTPATIENT
Start: 2019-09-24 | End: 2019-09-24 | Stop reason: HOSPADM

## 2019-09-24 RX ORDER — LORAZEPAM 2 MG/ML
0.25 INJECTION INTRAMUSCULAR ONCE AS NEEDED
Status: DISCONTINUED | OUTPATIENT
Start: 2019-09-24 | End: 2019-09-24 | Stop reason: HOSPADM

## 2019-09-24 RX ORDER — LIDOCAINE HCL/PF 100 MG/5ML
SYRINGE (ML) INTRAVENOUS
Status: DISCONTINUED | OUTPATIENT
Start: 2019-09-24 | End: 2019-09-24

## 2019-09-24 RX ORDER — ONDANSETRON 2 MG/ML
4 INJECTION INTRAMUSCULAR; INTRAVENOUS EVERY 12 HOURS PRN
Status: DISCONTINUED | OUTPATIENT
Start: 2019-09-24 | End: 2019-09-24 | Stop reason: HOSPADM

## 2019-09-24 RX ORDER — PROMETHAZINE HYDROCHLORIDE 25 MG/1
25 TABLET ORAL EVERY 6 HOURS PRN
Status: DISCONTINUED | OUTPATIENT
Start: 2019-09-24 | End: 2019-09-24 | Stop reason: HOSPADM

## 2019-09-24 RX ORDER — FENTANYL CITRATE 50 UG/ML
100 INJECTION, SOLUTION INTRAMUSCULAR; INTRAVENOUS ONCE
Status: COMPLETED | OUTPATIENT
Start: 2019-09-24 | End: 2019-09-24

## 2019-09-24 RX ORDER — DIPHENHYDRAMINE HYDROCHLORIDE 50 MG/ML
25 INJECTION INTRAMUSCULAR; INTRAVENOUS EVERY 6 HOURS PRN
Status: DISCONTINUED | OUTPATIENT
Start: 2019-09-24 | End: 2019-09-24 | Stop reason: HOSPADM

## 2019-09-24 RX ORDER — KETOROLAC TROMETHAMINE 30 MG/ML
INJECTION, SOLUTION INTRAMUSCULAR; INTRAVENOUS
Status: DISCONTINUED | OUTPATIENT
Start: 2019-09-24 | End: 2019-09-24

## 2019-09-24 RX ORDER — GLYCOPYRROLATE 0.2 MG/ML
INJECTION INTRAMUSCULAR; INTRAVENOUS
Status: DISCONTINUED | OUTPATIENT
Start: 2019-09-24 | End: 2019-09-24

## 2019-09-24 RX ORDER — MIDAZOLAM HYDROCHLORIDE 1 MG/ML
INJECTION, SOLUTION INTRAMUSCULAR; INTRAVENOUS
Status: DISCONTINUED | OUTPATIENT
Start: 2019-09-24 | End: 2019-09-24

## 2019-09-24 RX ORDER — CEFAZOLIN SODIUM 1 G/3ML
2 INJECTION, POWDER, FOR SOLUTION INTRAMUSCULAR; INTRAVENOUS
Status: COMPLETED | OUTPATIENT
Start: 2019-09-24 | End: 2019-09-24

## 2019-09-24 RX ORDER — DEXAMETHASONE SODIUM PHOSPHATE 4 MG/ML
INJECTION, SOLUTION INTRA-ARTICULAR; INTRALESIONAL; INTRAMUSCULAR; INTRAVENOUS; SOFT TISSUE
Status: DISCONTINUED | OUTPATIENT
Start: 2019-09-24 | End: 2019-09-24

## 2019-09-24 RX ORDER — ONDANSETRON 2 MG/ML
INJECTION INTRAMUSCULAR; INTRAVENOUS
Status: DISCONTINUED | OUTPATIENT
Start: 2019-09-24 | End: 2019-09-24

## 2019-09-24 RX ORDER — PREGABALIN 75 MG/1
75 CAPSULE ORAL
Status: COMPLETED | OUTPATIENT
Start: 2019-09-24 | End: 2019-09-24

## 2019-09-24 RX ORDER — HYDROMORPHONE HYDROCHLORIDE 1 MG/ML
0.2 INJECTION, SOLUTION INTRAMUSCULAR; INTRAVENOUS; SUBCUTANEOUS EVERY 5 MIN PRN
Status: DISCONTINUED | OUTPATIENT
Start: 2019-09-24 | End: 2019-09-24 | Stop reason: HOSPADM

## 2019-09-24 RX ORDER — PROPOFOL 10 MG/ML
VIAL (ML) INTRAVENOUS
Status: DISCONTINUED | OUTPATIENT
Start: 2019-09-24 | End: 2019-09-24

## 2019-09-24 RX ORDER — MIDAZOLAM HYDROCHLORIDE 1 MG/ML
0.5 INJECTION INTRAMUSCULAR; INTRAVENOUS
Status: DISCONTINUED | OUTPATIENT
Start: 2019-09-24 | End: 2019-09-24 | Stop reason: HOSPADM

## 2019-09-24 RX ORDER — NEOSTIGMINE METHYLSULFATE 1 MG/ML
INJECTION, SOLUTION INTRAVENOUS
Status: DISCONTINUED | OUTPATIENT
Start: 2019-09-24 | End: 2019-09-24

## 2019-09-24 RX ORDER — ROCURONIUM BROMIDE 10 MG/ML
INJECTION, SOLUTION INTRAVENOUS
Status: DISCONTINUED | OUTPATIENT
Start: 2019-09-24 | End: 2019-09-24

## 2019-09-24 RX ORDER — KETAMINE HYDROCHLORIDE 100 MG/ML
INJECTION, SOLUTION INTRAMUSCULAR; INTRAVENOUS
Status: DISCONTINUED | OUTPATIENT
Start: 2019-09-24 | End: 2019-09-24

## 2019-09-24 RX ORDER — BUPIVACAINE HYDROCHLORIDE 5 MG/ML
INJECTION, SOLUTION EPIDURAL; INTRACAUDAL
Status: DISCONTINUED | OUTPATIENT
Start: 2019-09-24 | End: 2019-09-24

## 2019-09-24 RX ADMIN — ROCURONIUM BROMIDE 30 MG: 10 INJECTION, SOLUTION INTRAVENOUS at 07:09

## 2019-09-24 RX ADMIN — KETAMINE HYDROCHLORIDE 20 MG: 100 INJECTION, SOLUTION, CONCENTRATE INTRAMUSCULAR; INTRAVENOUS at 08:09

## 2019-09-24 RX ADMIN — PROPOFOL 30 MG: 10 INJECTION, EMULSION INTRAVENOUS at 08:09

## 2019-09-24 RX ADMIN — KETAMINE HYDROCHLORIDE 30 MG: 100 INJECTION, SOLUTION, CONCENTRATE INTRAMUSCULAR; INTRAVENOUS at 07:09

## 2019-09-24 RX ADMIN — HYDROMORPHONE HYDROCHLORIDE 0.2 MG: 1 INJECTION, SOLUTION INTRAMUSCULAR; INTRAVENOUS; SUBCUTANEOUS at 09:09

## 2019-09-24 RX ADMIN — DEXAMETHASONE SODIUM PHOSPHATE 8 MG: 4 INJECTION, SOLUTION INTRAMUSCULAR; INTRAVENOUS at 07:09

## 2019-09-24 RX ADMIN — SODIUM CHLORIDE: 0.9 INJECTION, SOLUTION INTRAVENOUS at 05:09

## 2019-09-24 RX ADMIN — MORPHINE SULFATE 2 MG: 2 INJECTION, SOLUTION INTRAMUSCULAR; INTRAVENOUS at 09:09

## 2019-09-24 RX ADMIN — PREGABALIN 75 MG: 25 CAPSULE ORAL at 06:09

## 2019-09-24 RX ADMIN — MIDAZOLAM 1 MG: 1 INJECTION INTRAMUSCULAR; INTRAVENOUS at 07:09

## 2019-09-24 RX ADMIN — SODIUM CHLORIDE, SODIUM GLUCONATE, SODIUM ACETATE, POTASSIUM CHLORIDE, MAGNESIUM CHLORIDE, SODIUM PHOSPHATE, DIBASIC, AND POTASSIUM PHOSPHATE: .53; .5; .37; .037; .03; .012; .00082 INJECTION, SOLUTION INTRAVENOUS at 08:09

## 2019-09-24 RX ADMIN — OXYCODONE HYDROCHLORIDE 10 MG: 5 TABLET ORAL at 09:09

## 2019-09-24 RX ADMIN — PROPOFOL 40 MG: 10 INJECTION, EMULSION INTRAVENOUS at 08:09

## 2019-09-24 RX ADMIN — NEOSTIGMINE METHYLSULFATE 4 MG: 1 INJECTION INTRAVENOUS at 08:09

## 2019-09-24 RX ADMIN — HYDROMORPHONE HYDROCHLORIDE 0.2 MG: 1 INJECTION, SOLUTION INTRAMUSCULAR; INTRAVENOUS; SUBCUTANEOUS at 10:09

## 2019-09-24 RX ADMIN — LIDOCAINE HYDROCHLORIDE 100 MG: 20 INJECTION, SOLUTION INTRAVENOUS at 07:09

## 2019-09-24 RX ADMIN — BUPIVACAINE HYDROCHLORIDE 10 ML: 5 INJECTION, SOLUTION EPIDURAL; INTRACAUDAL; PERINEURAL at 11:09

## 2019-09-24 RX ADMIN — KETOROLAC TROMETHAMINE 30 MG: 30 INJECTION, SOLUTION INTRAMUSCULAR at 08:09

## 2019-09-24 RX ADMIN — ROCURONIUM BROMIDE 20 MG: 10 INJECTION, SOLUTION INTRAVENOUS at 07:09

## 2019-09-24 RX ADMIN — CEFAZOLIN 2 G: 330 INJECTION, POWDER, FOR SOLUTION INTRAMUSCULAR; INTRAVENOUS at 07:09

## 2019-09-24 RX ADMIN — PROPOFOL 200 MG: 10 INJECTION, EMULSION INTRAVENOUS at 07:09

## 2019-09-24 RX ADMIN — PROPOFOL 50 MG: 10 INJECTION, EMULSION INTRAVENOUS at 09:09

## 2019-09-24 RX ADMIN — PROPOFOL 80 MG: 10 INJECTION, EMULSION INTRAVENOUS at 07:09

## 2019-09-24 RX ADMIN — PROPOFOL 50 MCG/KG/MIN: 10 INJECTION, EMULSION INTRAVENOUS at 07:09

## 2019-09-24 RX ADMIN — GLYCOPYRROLATE 0.4 MG: 0.2 INJECTION, SOLUTION INTRAMUSCULAR; INTRAVENOUS at 08:09

## 2019-09-24 RX ADMIN — ONDANSETRON 4 MG: 2 INJECTION INTRAMUSCULAR; INTRAVENOUS at 08:09

## 2019-09-24 RX ADMIN — KETAMINE HYDROCHLORIDE 20 MG: 100 INJECTION, SOLUTION, CONCENTRATE INTRAMUSCULAR; INTRAVENOUS at 07:09

## 2019-09-24 RX ADMIN — MIDAZOLAM HYDROCHLORIDE 2 MG: 1 INJECTION, SOLUTION INTRAMUSCULAR; INTRAVENOUS at 06:09

## 2019-09-24 RX ADMIN — FENTANYL CITRATE 100 MCG: 50 INJECTION INTRAMUSCULAR; INTRAVENOUS at 06:09

## 2019-09-24 RX ADMIN — PROPOFOL 80 MG: 10 INJECTION, EMULSION INTRAVENOUS at 08:09

## 2019-09-24 NOTE — PROGRESS NOTES
"Pt states " I  Feel uncoordinated and not  comfortable with my crutches." Pt instructed on crutch training via demonstration and demonstrated in return. Pt also verabalized understanding. Pt ready for discharge  "

## 2019-09-24 NOTE — OP NOTE
DATE OF PROCEDURE: 9/24/2019      SURGEON: Danielle Cloud M.D.     ASSISTANT: Lokesh Delgado MD PGY 6  [facility]    PREOPERATIVE DIAGNOSES:   Left   medial and lateral retinaculum and capsular tears  comminuted inferior pole patella fracture    POSTOPERATIVE DIAGNOSES:   Left   medial and lateral retinaculum and capsular tears  comminuted inferior pole patella fracture     OPERATION:   Left  1. knee ligament reconstruction, intra-articular (medial, lateral retinaculum and capsule) (CPT 68479)  2. knee patellar tendon reconstruction (CPT 55580) - 22 modifier for complex procedure  3. Knee partial inferior pole patellectomy      ANESTHESIA:   general with endotracheal tube, with adductor block     ESTIMATED BLOOD LOSS:   50 cc     TOURNIQUET TIME:   60 min     DRAINS:  None     COMPLICATIONS:    The patient was moved to the recovery room in stable condition with compartments soft and cap refill less than a second in all digits.    COMPLEX PROCEDURE:  The comminution of the patella and the scarring around the comminution made this case medically more complex than the usual patella fracture fixation. There was an altered surgical field. There was abnormal anatomy. There was major scarring to the area which needed to be dissected.  Complexity of the service was much greater than the normative procedure.  There was increased time, intensity and technical difficulty of the procedure, severity of the patient's condition and mental effort required.  This was a highly complicated repair and tear pattern that required advanced surgical skill in order to safely and technically perform it.  Nevertheless the repair achieved was excellent.    INDICATIONS/MEDICAL NECESSITY: Marielena Tracy is a 58 y.o. female who has history and physical examination findings consistent with the above.  Nonoperative versus operative options were discussed.  The risks and benefits were discussed with the patient.  The patient acknowledged  understanding and wished to proceed with operative intervention.  Informed consent was obtained prior to the procedure.  Reasonable expectations and potential complications were discussed and acknowledged, including but not limited to infection, bleeding, blood clots, (DVT and/or PE), nerve injury, tear, instability, continued pain and stiffness.  They agreed and understood and wished to proceed.     Marielena Tracy is a 58 y.o. female presenting to clinic with a comminuted inferior pole patella fracture. After a fall from standing.    PROCEDURE IN DETAIL:  After the correct operative site was marked by the operating surgeon. The patient was then taken to the operating room and placed supine on the operating room table, where the patient  underwent general anesthesia by the anesthesia team.  All pressure points were carefully padded and checked.  The upper extremities and both lower extremities were placed in comfortable positions and were also well-padded.  The operative upper extremity was then prepped and draped in the usual sterile fashion.    Preoperative antibiotics were given prior to beginning the procedure. A bump was placed over the ipsilateral hip. The knee was flexed to approximately 20-30 degrees. A midline incision about 10 cm in length was carried over the middle of the patella from the quadriceps insertion to the tibial tubercle. The incision was carried to the prepatellar fascia and medial lateral cutaneous flaps were raised.  Careful hemostasis was obtained during this approach.  Fracture site was appreciated and debrided of all tissue.  The fracture edges were cleaned with sharply with a knife and rongeur. The inferior pole of the patella was noted to be significantly comminuted and no significant bone stock remained.  The inferior pole was shelled out and the patellar tendon edges were debrided gently.  Patellar tendon was then whipstitched with a #2 FiberWire suture.  Four suture limbs  were created in the proximal portion the tendon. Bone tunnels were created with a 2.7 mm drill bit in the proximal patella.  Good spread was achieved and the bone tunnels.  A Mujica suture Passer was used to pass the limbs of the FiberWire to the superior pole of the patella. The tendon was advanced with the leg in 0° of extension and tensioned appropriately.  Knots were tied superiorly in the knots were buried underneath the tendon. Then the knee was found to be stable in flexion from 0-9 degrees.    The wound was copiously irrigated with saline. The retinacula were reapproximated with 0 Vicryl. The paratenon was also reapproximated with 0 Vicryl.  Wound was closed in layers with 0 Vicryl, 3-0 Vicryl, 4-0 Monocryl suture. The wound was dressed with Steri-Strips, 4 x 4 gauze, ABD pads, and Dhruv hose.  Sterile TENS unit pads were placed. The leg was placed in a hinged knee brace locked at 0° of extension.  Patient taken to PACU no issues.    Medial and lateral retinaculum (intra-articular) and capsule tearing was repaired with 0 Vicryl in an interrupted fashion.    Quad tendon layer was repaired with 0 Vicryl. The wound was closed in layers with 0 Vicryl, 3-0 Vicryl, 4-0 Monocryl. Wound was dressed with Steri-Strips, 4 x 4 gauze, ABD pads, soft roll, Dhruv hose. Sterile TENS unit pad were applied prior to dressing the wound. The leg was placed in a hinged knee brace locked at 0° of extension. Patient was taken to the recovery room in stable condition with compartments soft and cap refill less than a second in all digits     POSTOPERATIVE PLAN: Remain PWB for 6 weeks, locked out straight x 4 weeks

## 2019-09-24 NOTE — PLAN OF CARE
Pt pain tolerated and pt denies and nausea or vomiting. Family at bedside. Pt instructed on home polar ice care and discharge instructions. Pt verbalized understanding of instructions and family at bedside. Pt consent forms from procedure in chart. Will continue with discharge process and monitoring pt

## 2019-09-24 NOTE — ANESTHESIA PROCEDURE NOTES
Left adductor canal single shot    Patient location during procedure: pre-op   Block not for primary anesthetic.  Reason for block: at surgeon's request and post-op pain management   Post-op Pain Location: Left knee pain  Start time: 9/24/2019 6:32 AM  Timeout: 9/24/2019 6:32 AM   End time: 9/24/2019 6:37 AM    Staffing  Authorizing Provider: Alex Teixeira MD  Performing Provider: Alex Teixeira MD    Preanesthetic Checklist  Completed: patient identified, site marked, surgical consent, pre-op evaluation, timeout performed, IV checked, risks and benefits discussed and monitors and equipment checked  Peripheral Block  Patient position: supine  Prep: ChloraPrep  Patient monitoring: heart rate, cardiac monitor, continuous pulse ox, continuous capnometry and frequent blood pressure checks  Block type: adductor canal  Laterality: left  Injection technique: single shot  Needle  Needle type: Stimuplex   Needle gauge: 21 G  Needle length: 4 in  Needle localization: ultrasound guidance   -ultrasound image captured on disc.  Assessment  Injection assessment: negative aspiration, negative parasthesia and local visualized surrounding nerve  Paresthesia pain: none  Heart rate change: no  Slow fractionated injection: yes  Additional Notes  VSS.  [facility] RN monitoring vitals throughout procedure.  Patient tolerated procedure well.  20ml 0.25% ropivacaine.

## 2019-09-24 NOTE — ANESTHESIA PROCEDURE NOTES
Left adductor canal single shot    Patient location during procedure: pre-op   Block not for primary anesthetic.  Reason for block: at surgeon's request and post-op pain management   Post-op Pain Location: Left leg pain  Start time: 9/24/2019 10:49 AM  Timeout: 9/24/2019 10:49 AM   End time: 9/24/2019 10:55 AM    Staffing  Authorizing Provider: Alex Teixeira MD  Performing Provider: Alex Teixeira MD    Preanesthetic Checklist  Completed: patient identified, site marked, surgical consent, pre-op evaluation, timeout performed, IV checked, risks and benefits discussed and monitors and equipment checked  Peripheral Block  Patient position: supine  Prep: ChloraPrep  Patient monitoring: heart rate, cardiac monitor, continuous pulse ox, continuous capnometry and frequent blood pressure checks  Block type: adductor canal  Laterality: left  Injection technique: single shot  Needle  Needle type: Stimuplex   Needle gauge: 21 G  Needle length: 4 in  Needle localization: anatomical landmarks and ultrasound guidance   -ultrasound image captured on disc.  Assessment  Injection assessment: negative aspiration, negative parasthesia and local visualized surrounding nerve  Paresthesia pain: none  Heart rate change: no  Slow fractionated injection: yes  Additional Notes  VSS.  [facility] RN monitoring vitals throughout procedure.  Patient tolerated procedure well.

## 2019-09-24 NOTE — TRANSFER OF CARE
"Anesthesia Transfer of Care Note    Patient: Marielena Tracy    Procedure(s) Performed: Procedure(s) (LRB):  REPAIR/RECONSTRUCTION TENDON, PATELLAR (Left)    Patient location: PACU    Anesthesia Type: general    Transport from OR: Transported from OR on 6-10 L/min O2 by face mask with adequate spontaneous ventilation    Post pain: pain needs to be addressed    Post assessment: no apparent anesthetic complications and tolerated procedure well    Post vital signs: stable    Level of consciousness: responds to stimulation and sedated    Nausea/Vomiting: no nausea/vomiting    Complications: none    Transfer of care protocol was followed      Last vitals:   Visit Vitals  /64 (BP Location: Right arm, Patient Position: Lying)   Pulse 74   Temp 36.3 °C (97.3 °F) (Oral)   Resp 18   Ht 5' 4" (1.626 m)   Wt 87.1 kg (192 lb)   SpO2 100%   Breastfeeding? No   BMI 32.96 kg/m²     "

## 2019-09-24 NOTE — INTERVAL H&P NOTE
The patient has been examined and the H&P has been reviewed:    I concur with the findings and no changes have occurred since H&P was written.    Anesthesia/Surgery risks, benefits and alternative options discussed and understood by patient/family.          Active Hospital Problems    Diagnosis  POA    Closed displaced transverse fracture of left patella [S82.032A]  Yes      Resolved Hospital Problems   No resolved problems to display.

## 2019-09-24 NOTE — DISCHARGE INSTRUCTIONS
PATIENT INSTRUCTIONS  POST-ANESTHESIA    IMMEDIATELY FOLLOWING SURGERY:  Do not drive or operate machinery for the first twenty four hours after surgery.  Do not make any important decisions for twenty four hours after surgery or while taking narcotic pain medications or sedatives.  If you develop intractable nausea and vomiting or a severe headache please notify your doctor immediately.    FOLLOW-UP:  Please make an appointment with your surgeon as instructed. You do not need to follow up with anesthesia unless specifically instructed to do so.    WOUND CARE INSTRUCTIONS (if applicable):  Keep a dry clean dressing on the anesthesia/puncture wound site if there is drainage.  Once the wound has quit draining you may leave it open to air.  Generally you should leave the bandage intact for twenty four hours unless there is drainage.  If the epidural site drains for more than 36-48 hours please call the anesthesia department.    QUESTIONS?:  Please feel free to call your physician or the hospital  if you have any questions, and they will be happy to assist you.       [facility] Anesthesia Department  [address] [facility]  [phone]          Recovery After Procedural Sedation (Adult)  You have been given medicine by vein to make you sleep during your surgery. This may have included both a pain medicine and sleeping medicine. Most of the effects have worn off. But you may still have some drowsiness for the next 6 to 8 hours.  Home care  Follow these guidelines when you get home:  · For the next 8 hours, you should be watched by a responsible adult. This person should make sure your condition is not getting worse.  · Don't drink any alcohol for the next 24 hours.  · Don't drive, operate dangerous machinery, or make important business or personal decisions during the next 24 hours.  Note: Your healthcare provider may tell you not to take any medicine by mouth for pain or sleep in the next 4  hours. These medicines may react with the medicines you were given in the hospital. This could cause a much stronger response than usual.  Follow-up care  Follow up with your healthcare provider if you are not alert and back to your usual level of activity within 12 hours.  When to seek medical advice  Call your healthcare provider right away if any of these occur:  · Drowsiness gets worse  · Weakness or dizziness gets worse  · Repeated vomiting  · You can't be awakened   Date Last Reviewed: 10/18/2016  © 4079-7197 [employer]. [address] [place] [address], [place] [address]. All rights reserved. This information is not intended as a substitute for professional medical care. Always follow your healthcare professional's instructions.

## 2019-09-24 NOTE — ANESTHESIA POSTPROCEDURE EVALUATION
Anesthesia Post Evaluation    Patient: Marielena Tracy    Procedure(s) Performed: Procedure(s) (LRB):  REPAIR/RECONSTRUCTION TENDON, PATELLAR (Left)    Final Anesthesia Type: general  Patient location during evaluation: PACU  Patient participation: Yes- Able to Participate  Level of consciousness: awake and alert and oriented  Post-procedure vital signs: reviewed and stable  Pain management: adequate  Airway patency: patent  PONV status at discharge: No PONV  Anesthetic complications: no      Cardiovascular status: hemodynamically stable  Respiratory status: unassisted, spontaneous ventilation and room air  Hydration status: euvolemic  Follow-up not needed.          Vitals Value Taken Time   /70 9/24/2019 12:00 PM   Temp 36.8 °C (98.2 °F) 9/24/2019 12:00 PM   Pulse 70 9/24/2019 12:00 PM   Resp 16 9/24/2019 12:00 PM   SpO2 97 % 9/24/2019 12:00 PM         Event Time     Out of Recovery 11:15:00          Pain/Radha Score: Pain Rating Prior to Med Admin: 10 (9/24/2019 10:16 AM)  Pain Rating Post Med Admin: 10 (9/24/2019 10:16 AM)  Radha Score: 10 (9/24/2019 12:00 PM)

## 2019-09-25 ENCOUNTER — PATIENT MESSAGE (OUTPATIENT)
Dept: ADMINISTRATIVE | Facility: OTHER | Age: 59
End: 2019-09-25

## 2019-09-25 ENCOUNTER — PATIENT MESSAGE (OUTPATIENT)
Dept: SPORTS MEDICINE | Facility: CLINIC | Age: 59
End: 2019-09-25

## 2019-09-26 ENCOUNTER — TELEPHONE (OUTPATIENT)
Dept: INTERNAL MEDICINE | Facility: CLINIC | Age: 59
End: 2019-09-26

## 2019-09-26 ENCOUNTER — PATIENT MESSAGE (OUTPATIENT)
Dept: ADMINISTRATIVE | Facility: OTHER | Age: 59
End: 2019-09-26

## 2019-09-26 ENCOUNTER — PATIENT MESSAGE (OUTPATIENT)
Dept: SPORTS MEDICINE | Facility: CLINIC | Age: 59
End: 2019-09-26

## 2019-09-26 NOTE — TELEPHONE ENCOUNTER
Ortho needs to complete this disability form since it seems to relate to her Ortho surgery. I will not know the answers to this info .

## 2019-09-27 ENCOUNTER — PATIENT MESSAGE (OUTPATIENT)
Dept: ADMINISTRATIVE | Facility: OTHER | Age: 59
End: 2019-09-27

## 2019-09-27 ENCOUNTER — PATIENT MESSAGE (OUTPATIENT)
Dept: SPORTS MEDICINE | Facility: CLINIC | Age: 59
End: 2019-09-27

## 2019-09-27 VITALS
BODY MASS INDEX: 32.78 KG/M2 | RESPIRATION RATE: 16 BRPM | HEART RATE: 70 BPM | OXYGEN SATURATION: 97 % | TEMPERATURE: 98 F | SYSTOLIC BLOOD PRESSURE: 125 MMHG | WEIGHT: 192 LBS | DIASTOLIC BLOOD PRESSURE: 70 MMHG | HEIGHT: 64 IN

## 2019-09-30 ENCOUNTER — TELEPHONE (OUTPATIENT)
Dept: SPORTS MEDICINE | Facility: CLINIC | Age: 59
End: 2019-09-30

## 2019-09-30 DIAGNOSIS — S82.032A CLOSED DISPLACED TRANSVERSE FRACTURE OF LEFT PATELLA, INITIAL ENCOUNTER: ICD-10-CM

## 2019-09-30 DIAGNOSIS — G89.18 POST-OPERATIVE PAIN: ICD-10-CM

## 2019-09-30 DIAGNOSIS — S82.032A DISPLACED TRANSVERSE FRACTURE OF LEFT PATELLA, INITIAL ENCOUNTER FOR CLOSED FRACTURE: ICD-10-CM

## 2019-09-30 RX ORDER — OXYCODONE AND ACETAMINOPHEN 10; 325 MG/1; MG/1
TABLET ORAL
Qty: 12 TABLET | Refills: 0 | Status: SHIPPED | OUTPATIENT
Start: 2019-09-30 | End: 2019-10-10 | Stop reason: SDUPTHER

## 2019-09-30 RX ORDER — TRAMADOL HYDROCHLORIDE 50 MG/1
50-100 TABLET ORAL EVERY 6 HOURS PRN
Qty: 12 TABLET | Refills: 0 | Status: SHIPPED | OUTPATIENT
Start: 2019-09-30 | End: 2019-10-10 | Stop reason: SDUPTHER

## 2019-10-01 ENCOUNTER — PATIENT MESSAGE (OUTPATIENT)
Dept: ADMINISTRATIVE | Facility: OTHER | Age: 59
End: 2019-10-01

## 2019-10-03 ENCOUNTER — PATIENT MESSAGE (OUTPATIENT)
Dept: SPORTS MEDICINE | Facility: CLINIC | Age: 59
End: 2019-10-03

## 2019-10-04 ENCOUNTER — TELEPHONE (OUTPATIENT)
Dept: SPORTS MEDICINE | Facility: CLINIC | Age: 59
End: 2019-10-04

## 2019-10-04 NOTE — TELEPHONE ENCOUNTER
Received a request from  [facility] for patients medical records. The request was sent to our medical record department.    Chantell Haider MA  [facility]

## 2019-10-07 NOTE — DISCHARGE SUMMARY
[facility]  Brief Operative Note     SUMMARY     Surgery Date: 9/24/2019     Surgeon(s) and Role:     * Danielle Cloud MD - Primary    Assisting Surgeon: None    Pre-op Diagnosis:  Closed displaced transverse fracture of left patella, initial encounter [S82.032A]    Post-op Diagnosis:  Post-Op Diagnosis Codes:     * Closed displaced transverse fracture of left patella, initial encounter [S82.032A]    Procedure(s) (LRB):  REPAIR/RECONSTRUCTION TENDON, PATELLAR (Left)    Anesthesia: General    Description of the findings of the procedure: see above    Findings/Key Components: see abovd    Estimated Blood Loss: * No values recorded between 9/24/2019  7:43 AM and 9/24/2019  9:15 AM *         Specimens:   Specimen (12h ago, onward)    None          Discharge Note    SUMMARY     Admit Date: 9/24/2019    Discharge Date and Time:  9/24/19    Hospital Course (synopsis of major diagnoses, care, treatment, and services provided during the course of the hospital stay): see above     Final Diagnosis: Post-Op Diagnosis Codes:     * Closed displaced transverse fracture of left patella, initial encounter [S82.032A]    Disposition: Home or Self Care    Follow Up/Patient Instructions:     Medications:  Reconciled Home Medications:      Medication List      CONTINUE taking these medications    aspirin 81 MG EC tablet  Commonly known as:  ECOTRIN  Take 81 mg by mouth once daily.     citalopram 40 MG tablet  Commonly known as:  CELEXA  Take 1 tablet (40 mg total) by mouth once daily.     clobetasol 0.05% 0.05 % Oint  Commonly known as:  TEMOVATE  AAA bid     conjugated estrogens vaginal cream  Commonly known as:  PREMARIN  1/2 applicator vaginally twice weekly H.S.     fish oil-omega-3 fatty acids 300-1,000 mg capsule  Take 2 g by mouth once daily.     latanoprost 0.005 % ophthalmic solution  Place 1 drop into both eyes every evening.     LORazepam 1 MG tablet  Commonly known as:  ATIVAN  TAKE 1 TABLET (1 MG TOTAL) BY  "MOUTH EVERY EVENING.     ondansetron 4 MG Tbdl  Commonly known as:  ZOFRAN-ODT  Take 1 tablet (4 mg total) by mouth every 6 (six) hours as needed.     simvastatin 40 MG tablet  Commonly known as:  ZOCOR  Take 1 tablet (40 mg total) by mouth once daily.     triamcinolone acetonide 0.1% 0.1 % ointment  Commonly known as:  KENALOG  AAA bid     urea 50 % Crea  Apply 1 application topically once daily. Apply to affected area     VITAMIN C 100 MG tablet  Generic drug:  ascorbic acid (vitamin C)  Take 100 mg by mouth 2 (two) times daily.     VITAMIN D-3 ORAL  Take 1,000 mg by mouth once daily.          Discharge Procedure Orders   CRUTCHES FOR HOME USE   Order Comments: Provide if needed     Order Specific Question Answer Comments   Type: Axillary    Height: 5' 4" (1.626 m)    Weight: 87.1 kg (192 lb)    Does patient have medical equipment at home? none    Length of need (1-99 months): 24      Diet general     Prior Authorization Order     Order Specific Question Answer Comments   What medications need authorization? LYRICA [[id number]]    Dose 75mg lyrica    Frequency once pre-op and once post-op      Call MD for:  temperature >100.4     Call MD for:  persistent nausea and vomiting     Call MD for:  severe uncontrolled pain     Call MD for:  difficulty breathing, headache or visual disturbances     Call MD for:  redness, tenderness, or signs of infection (pain, swelling, redness, odor or green/yellow discharge around incision site)     Call MD for:  hives     Call MD for:  persistent dizziness or light-headedness     Leave dressing on - Keep it clean, dry, and intact until clinic visit       "

## 2019-10-08 ENCOUNTER — CLINICAL SUPPORT (OUTPATIENT)
Dept: REHABILITATION | Facility: HOSPITAL | Age: 59
End: 2019-10-08
Payer: COMMERCIAL

## 2019-10-08 DIAGNOSIS — M25.60 RANGE OF MOTION DEFICIT: ICD-10-CM

## 2019-10-08 DIAGNOSIS — R53.1 WEAKNESS: ICD-10-CM

## 2019-10-08 PROCEDURE — 97110 THERAPEUTIC EXERCISES: CPT

## 2019-10-08 PROCEDURE — 97140 MANUAL THERAPY 1/> REGIONS: CPT

## 2019-10-08 PROCEDURE — 97161 PT EVAL LOW COMPLEX 20 MIN: CPT

## 2019-10-08 NOTE — PLAN OF CARE
"[facility]  Physical Therapy Initial Evaluation    Name: Marielena Tracy  Clinic Number: [id number]    Therapy Diagnosis:   Encounter Diagnoses   Name Primary?    Range of motion deficit     Weakness      Physician: Ran Thomas III, *  S82.032A (ICD-10-CM) - Closed displaced transverse fracture of left patella, initial encounter    S82.032A (ICD-10-CM) - Displaced transverse fracture of left patella, initial encounter for closed fracture  Physician Orders: 2    Medical Diagnosis: Eval and treat   Date of Surgery: 19  Evaluation Date: 10/8/2019  Authorization Period Expiration: 19  Plan of Care Certification Period: 20  Visit # / Visits authorized:     Time In: 300pm  Time Out: 400pm  Total Billable Time: 60 minutes    Precautions: Standard    Subjective   Date of onset:  19  History of current condition - Marielena reports: falling at home and landing on her left knee.  She notes just a loss of balance, denies syncope.         Past Medical History:   Diagnosis Date    Anxiety and depression 2014    Colon polyps     Glaucoma     Hyperlipidemia     Hypertension     pt states " i don't have high blood pressure"    Keloid cicatrix     Obesity (BMI 30.0-34.9)      Marielena Tracy  has a past surgical history that includes  section; Colonoscopy (N/A, 2016); and Patellar tendon repair (Left, 2019).    Marielena has a current medication list which includes the following prescription(s): vitamin c, aspirin, calcium carbonate/vitamin d3, citalopram, clobetasol 0.05%, conjugated estrogens, fish oil-omega-3 fatty acids, latanoprost, lorazepam, ondansetron, oxycodone-acetaminophen, simvastatin, tramadol, triamcinolone acetonide 0.1%, and urea.    Review of patient's allergies indicates:   Allergen Reactions    Sulfa (sulfonamide antibiotics)         Imaging, CT scan films:  Redemonstrated comminuted transverse fracture inferior 3rd " of the left patella with distraction of the fracture elements.  No evidence for dislocation left knee.    Soft tissue swelling induration overlies the prepatellar soft tissues.    Prior Therapy: no   Social History:   lives with their spouse  Occupation:  5 steps to go into house  Prior Level of Function:  Independent and negotiating stairs without issues  Current Level of Function: unable to ambulate    Pain:  Current 2/10, worst 10/10, best 2/10   Location: left knee   Description: Aching, Dull, Sharp and Variable  Aggravating Factors: Flexing  Easing Factors: pain medication, ice and rest    Pts goals: get back to walking     Objective             Myotomes:   Myotome  RIGHT    Left     MUSCLE TEST  WNL  Dim.  Pain  WNL  Dim.  Pain    Shoulder Abduction (C5) x   x     Elbow Flex (C6) x   x     Wrist Ext (C7) x   x     Finger Flex (C8) x   x     Finger Abd (TI) x   x     Hip Flexion (L2-L3)  x   x     Knee Extension (L3-L4)  x   x     Dorsiflexion (L4)  x   x     Hallux Extension (L5)  x   x     Ankle Eversion (S1-S2)  x   x            DTR WNL  Dim.  Absent    Right Bicep x     Left Bicep x     Right Tricep x     Left Tricep x     Right Brachiorad x     Left Brachiorad x     Right-Quad  x     Left-Quad  na     Right Ankle  x     Left Ankle  x       Sensation:     Neurologicalc Screening- Sensory  Right    Left      LEVEL  WNL  Dim.  Absent  WNL  Dim.  Absent    L1 (inguinal area)  x   x     L2 (anterior mid-thigh)  x   x     L3 (distal anterior thigh)  x   x     L4 (medial lower leg/foot)  x   x     L5 (lateral leg/ foot)  x   x     S1 (lateral side of foot)  x   x                       Neurologicalc Screening- Sensory        LEVEL  WNL  Dim.  Absent  WNL  Dim.  Absent    C4 (Skin over AC jt) x   x     C5 (radial side of elbow) x   x     C6 (Dorsal surface of thumb) x   x     C7 (Dorsal 3rd digit) x   x     C8 (Dorsal 5th digit) x   x           ROM: Active/PROM        Knee ROM  right  left    flexion 140     extension 0                Strength: MMT not performed on the left LE secondary to surgical restrictions/precautions.  A grade of 2/5 was designated.      Knee MMT  Right  Left    Flexion  4/5 2/5   Extension  4/5 2/5   Hip abduction  4/5 2/5   Hip Adduction  4/5 2/5                 Special Tests:     GAIT: Normal  Squat test: n/a   Single leg squat:n/a  SFMA Results: n/a  Thoracic spine rotational mobility:n/a           27  Therapist reviewed FOTO scores for Marielena Tracy on 10/8/2019.   FOTO documents entered into [employer] - see Media section.    Limitation Score: 73%  Category: Mobility             TREATMENT   Treatment Time In: 330pm  Treatment Time Out: 400pm  Total Treatment time separate from Evaluation time:30    Marielena received therapeutic exercises to develop strength, endurance, ROM, flexibility and core stabilization for 15 minutes including:  Quad sets 50x   Ankle pmps 50x   Quad sets with overpressure into extension 15x       Marielena received the following manual therapy techniques: Joint mobilizations, Myofacial release, Soft tissue Mobilization and Friction Massage were applied to the: left knee for 10 minutes, including:  Light gd I patella mobs in all directions   Gd tibial ER/IR mobs 3x30 sec each   Ankle talus mobs a/p 3x30 sec each   Calcaneal rocking erver/inv 30x eac direction                  See EMR under patient instructions for exercises given.   Assessment   Marielena is a 58 y.o. female referred to outpatient Physical Therapy with a medical diagnosis of left knee pain secondary to falling at home.  She sustained a fractured patella and underwent inferior pole patellectomy of the left patella with patellar tendon advancement/reconstruction.  Pt presents with weakness in the left knee, limited motion in the left knee, and pain in the left knee that hinders her ability to perform ADLs and IADLs.  I have communicated with MD and we will keep her locked into extension for 4 weeks.     Pt  prognosis is Good.   Pt will benefit from skilled outpatient Physical Therapy to address the deficits stated above and in the chart below, provide pt/family education, and to maximize pt's level of independence.     Plan of care discussed with patient: Yes  Pt's spiritual, cultural and educational needs considered and patient is agreeable to the plan of care and goals as stated below:     Anticipated Barriers for therapy: none     Medical Necessity is demonstrated by the following  History  Co-morbidities and personal factors that may impact the plan of care Co-morbidities:   anxiety, depression and HTN    Personal Factors:   no deficits     low   Examination  Body Structures and Functions, activity limitations and participation restrictions that may impact the plan of care Body Regions:   lower extremities    Body Systems:    gross symmetry  ROM  strength  balance  gait    Participation Restrictions:   none    Activity limitations:   Learning and applying knowledge  no deficits    General Tasks and Commands  no deficits    Communication  no deficits    Mobility  lifting and carrying objects  fine hand use (grasping/picking up)  walking  moving around using equipment (WC)  using transportation (bus, train, plane, car)  driving (bike, car, motorcycle)    Self care  washing oneself (bathing, drying, washing hands)    Domestic Life  shopping  cooking  doing house work (cleaning house, washing dishes, laundry)    Interactions/Relationships  no deficits    Life Areas  no deficits    Community and Social Life  no deficits         low   Clinical Presentation stable and uncomplicated low   Decision Making/ Complexity Score: low     Goals:  Short Term GOALS:  In 4-8 weeks, pt. will:  1. Pt will increase ROM to the left knee to 90 deg of flexion,  in order to perform ADLs and IADLs more effectively   2. Decrease overall pain to the left knee to 3-4/10,  on average with daily activities   3. Increase strength to the left LE to  3+/5 grossly throughout,  in order to perform ADLs and IADLs more effectively   4. Improve FOTO intake score to 45 to demonstrate improvement with functional mobility and use  5. Independent with HEP  Long Term GOALS:  In 8-16 weeks, pt. will:  1. Pt will increase ROM to the left knee to WNL,  in order to perform ADLs and IADLs more effectively   2. Decrease overall pain to left knee to 1-2/10,  on average with daily activities   3. Increase strength to the left LE to 4/5,  in order to perform ADLs and IADLs more effectively   4. Improve FOTO intake score to 70 to demonstrate improvement with functional mobility and use  5. Independent with HEP  6. Return to full recreational and community walking  unrestricted       Plan   Certification Period/Plan of care expiration: 10/8/2019 to 1/28/20.    Outpatient Physical Therapy 3 times weekly for 10 -16 weeks to include the following interventions: Aquatic Therapy, Gait Training, Manual Therapy, Moist Heat/ Ice, Neuromuscular Re-ed, Patient Education, Therapeutic Activites and Therapeutic Exercise.     Hamilton Mckeon, PT

## 2019-10-09 ENCOUNTER — HOSPITAL ENCOUNTER (OUTPATIENT)
Dept: RADIOLOGY | Facility: HOSPITAL | Age: 59
Discharge: HOME | End: 2019-10-09
Attending: PHYSICIAN ASSISTANT
Payer: COMMERCIAL

## 2019-10-09 ENCOUNTER — OFFICE VISIT (OUTPATIENT)
Dept: SPORTS MEDICINE | Facility: CLINIC | Age: 59
End: 2019-10-09
Payer: COMMERCIAL

## 2019-10-09 VITALS
HEIGHT: 64 IN | DIASTOLIC BLOOD PRESSURE: 86 MMHG | HEART RATE: 86 BPM | SYSTOLIC BLOOD PRESSURE: 130 MMHG | WEIGHT: 192 LBS | BODY MASS INDEX: 32.78 KG/M2

## 2019-10-09 DIAGNOSIS — M25.562 LEFT KNEE PAIN, UNSPECIFIED CHRONICITY: ICD-10-CM

## 2019-10-09 DIAGNOSIS — S82.032A CLOSED DISPLACED TRANSVERSE FRACTURE OF LEFT PATELLA, INITIAL ENCOUNTER: Primary | ICD-10-CM

## 2019-10-09 PROCEDURE — 99999 PR PBB SHADOW E&M-EST. PATIENT-LVL IV: CPT | Mod: PBBFAC,,, | Performed by: PHYSICIAN ASSISTANT

## 2019-10-09 PROCEDURE — 73560 X-RAY EXAM OF KNEE 1 OR 2: CPT | Mod: TC,LT

## 2019-10-09 PROCEDURE — 73560 X-RAY EXAM OF KNEE 1 OR 2: CPT | Mod: 26,LT,, | Performed by: RADIOLOGY

## 2019-10-09 PROCEDURE — 99999 PR PBB SHADOW E&M-EST. PATIENT-LVL IV: ICD-10-PCS | Mod: PBBFAC,,, | Performed by: PHYSICIAN ASSISTANT

## 2019-10-09 PROCEDURE — 99024 PR POST-OP FOLLOW-UP VISIT: ICD-10-PCS | Mod: S$GLB,,, | Performed by: PHYSICIAN ASSISTANT

## 2019-10-09 PROCEDURE — 99024 POSTOP FOLLOW-UP VISIT: CPT | Mod: S$GLB,,, | Performed by: PHYSICIAN ASSISTANT

## 2019-10-09 PROCEDURE — 73560 XR KNEE 1 OR 2 VIEW LEFT: ICD-10-PCS | Mod: 26,LT,, | Performed by: RADIOLOGY

## 2019-10-09 NOTE — PROGRESS NOTES
HISTORY OF PRESENT ILLNESS:   Pt is here today for first post-operative followup of knee surgery.  she is doing well.  We have reviewed her findings and discussed plan of care and future treatment options.       She is wearing her T-scope brace with no issues. Pain well controlled.      DATE OF PROCEDURE: 9/24/2019      SURGEON: Danielle Cloud M.D.     OPERATION:   1.  Partial inferior pole patellectomy of the left patella with patellar tendon advancement/reconstruction CPT 68001 - 22 modifier                                                                          PHYSICAL EXAMINATION:     Incision sites healed well  No evidence of any erythema, infection or induration  Range of motion Not tested  Minimal effusion  2+ DP pulse  No swelling, no calf tenderness  - Hellen's sign  Negative medial joint line tendernes  Moderate quad atrophy    Xrays:  Xrays of the 2 view left knee were ordered and reviewed by me today. No fracture, subluxation, or other significant bony or joint abnormality is identified. Post-operative changes seen. Patella with normal position today.                                                                                  ASSESSMENT:                                                                                                                                               1. Status post above, doing well.                                                                                                                               PLAN:                                                                                                                                                     1. Continue with PT and HEP. Locked at 0 degrees for another 4-6 weeks per evelyn  Do a better job icing the knee at home.   2. Emphasized quad function.  3. I have discussed return to activity in detail.  4.Patient will see us back at 6 week post-op ilsa.                                    5. All questions were answered  and patient should contact us if she  has any questions or concerns in the interim.

## 2019-10-10 ENCOUNTER — CLINICAL SUPPORT (OUTPATIENT)
Dept: REHABILITATION | Facility: HOSPITAL | Age: 59
End: 2019-10-10
Payer: COMMERCIAL

## 2019-10-10 ENCOUNTER — PATIENT MESSAGE (OUTPATIENT)
Dept: SPORTS MEDICINE | Facility: CLINIC | Age: 59
End: 2019-10-10

## 2019-10-10 DIAGNOSIS — S82.032A CLOSED DISPLACED TRANSVERSE FRACTURE OF LEFT PATELLA, INITIAL ENCOUNTER: ICD-10-CM

## 2019-10-10 DIAGNOSIS — R53.1 WEAKNESS: ICD-10-CM

## 2019-10-10 DIAGNOSIS — M25.60 RANGE OF MOTION DEFICIT: ICD-10-CM

## 2019-10-10 DIAGNOSIS — S82.032A DISPLACED TRANSVERSE FRACTURE OF LEFT PATELLA, INITIAL ENCOUNTER FOR CLOSED FRACTURE: ICD-10-CM

## 2019-10-10 DIAGNOSIS — G89.18 POST-OPERATIVE PAIN: ICD-10-CM

## 2019-10-10 PROCEDURE — 97110 THERAPEUTIC EXERCISES: CPT

## 2019-10-10 PROCEDURE — 97140 MANUAL THERAPY 1/> REGIONS: CPT

## 2019-10-10 RX ORDER — TRAMADOL HYDROCHLORIDE 50 MG/1
50-100 TABLET ORAL EVERY 6 HOURS PRN
Qty: 15 TABLET | Refills: 0 | Status: SHIPPED | OUTPATIENT
Start: 2019-10-10 | End: 2019-11-20

## 2019-10-10 RX ORDER — OXYCODONE AND ACETAMINOPHEN 10; 325 MG/1; MG/1
TABLET ORAL
Qty: 15 TABLET | Refills: 0 | Status: SHIPPED | OUTPATIENT
Start: 2019-10-10 | End: 2019-10-30

## 2019-10-10 NOTE — PROGRESS NOTES
Physical Therapy Daily Treatment Note     Name: Marielena Yang [place]  Clinic Number: [id number]    Therapy Diagnosis:   Encounter Diagnoses   Name Primary?    Range of motion deficit     Weakness      Physician: Ran Thomas III, *    Visit Date: 10/10/2019   Physician: Ran Thomas III, *  S82.032A (ICD-10-CM) - Closed displaced transverse fracture of left patella, initial encounter    S82.032A (ICD-10-CM) - Displaced transverse fracture of left patella, initial encounter for closed fracture  Physician Orders: 2    Medical Diagnosis: Eval and treat   Date of Surgery: 9/24/19  Evaluation Date: 10/8/2019  Authorization Period Expiration: 12/31/19  Plan of Care Certification Period: 1/28/20  Visit # / Visits authorized: 2/ 20    Precautions: Weightbearing  POSTOPERATIVE PLAN: Remain PWB for 6 weeks, locked out straight x 4 weeks  Subjective      Pt reports: she was compliant with home exercise program given last session.   Response to previous treatment: improved quad activation   Functional change:  No change     Pain: 2/10  Location: left knee      Objective           Marielena received therapeutic exercises to develop strength, endurance, ROM, flexibility and core stabilization for 45 minutes including:  Quad sets 30x   Standing hip abd 30x   Quad sets w/ SLR assist with belt 30x   Ankle 4 way green 30x each direction   Standing hip extension, abduction, and flexion in brace 30x each         Marielena received the following manual therapy techniques: Joint mobilizations, Myofacial release and Soft tissue Mobilization were applied to the: left knee for 8 minutes, including:   light patella and tibia mobs, grade one as tolerated       Mraielena participated in neuromuscular re-education activities to improve: Balance, Coordination and Proprioception for   minutes. The following activities were included:       Marielena participated in dynamic functional therapeutic activities to improve  functional performance for    minutes, including:       Marielena participated in gait training to improve functional mobility and safety for    minutes, including:            Home Exercises Provided and Patient Education Provided     Education provided:   - not today    Written Home Exercises Provided: no .        Assessment     Pt tolerated session well.  She was able to move off and on the table without difficulties. We will keep her leg in the knee brace at all times during treatment session.  Overall, pt did very well today with session.    Marielena is progressing well towards her goals.   Pt prognosis is Good.     Pt will continue to benefit from skilled outpatient physical therapy to address the deficits listed in the problem list box on initial evaluation, provide pt/family education and to maximize pt's level of independence in the home and community environment.     Pt's spiritual, cultural and educational needs considered and pt agreeable to plan of care and goals.    Anticipated barriers to physical therapy: none     Goals:    In 4-8 weeks, pt. will:  1. Pt will increase ROM to the left knee to 90 deg of flexion,  in order to perform ADLs and IADLs more effectively   2. Decrease overall pain to the left knee to 3-4/10,  on average with daily activities   3. Increase strength to the left LE to 3+/5 grossly throughout,  in order to perform ADLs and IADLs more effectively   4. Improve FOTO intake score to 45 to demonstrate improvement with functional mobility and use  5. Independent with HEP  Long Term GOALS:  In 8-16 weeks, pt. will:  1. Pt will increase ROM to the left knee to WNL,  in order to perform ADLs and IADLs more effectively   2. Decrease overall pain to left knee to 1-2/10,  on average with daily activities   3. Increase strength to the left LE to 4/5,  in order to perform ADLs and IADLs more effectively   4. Improve FOTO intake score to 70 to demonstrate improvement with functional mobility and  use  5. Independent with HEP  6. Return to full recreational and community walking  unrestricted        Plan     Continue therapy as planned and progress accordingly.      Hamilton Mckeon, PT

## 2019-10-11 ENCOUNTER — PATIENT MESSAGE (OUTPATIENT)
Dept: SPORTS MEDICINE | Facility: CLINIC | Age: 59
End: 2019-10-11

## 2019-10-15 ENCOUNTER — PATIENT MESSAGE (OUTPATIENT)
Dept: SPORTS MEDICINE | Facility: CLINIC | Age: 59
End: 2019-10-15

## 2019-10-15 ENCOUNTER — CLINICAL SUPPORT (OUTPATIENT)
Dept: REHABILITATION | Facility: HOSPITAL | Age: 59
End: 2019-10-15
Payer: COMMERCIAL

## 2019-10-15 DIAGNOSIS — M25.60 RANGE OF MOTION DEFICIT: ICD-10-CM

## 2019-10-15 DIAGNOSIS — R53.1 WEAKNESS: ICD-10-CM

## 2019-10-15 PROCEDURE — 97110 THERAPEUTIC EXERCISES: CPT

## 2019-10-15 PROCEDURE — 97140 MANUAL THERAPY 1/> REGIONS: CPT

## 2019-10-15 PROCEDURE — 97116 GAIT TRAINING THERAPY: CPT

## 2019-10-15 NOTE — PROGRESS NOTES
Physical Therapy Daily Treatment Note     Name: Marielena Yang [place]  Clinic Number: [id number]    Therapy Diagnosis:   Encounter Diagnoses   Name Primary?    Range of motion deficit     Weakness      Physician: Ran Thomas III, *    Visit Date: 10/15/2019   Physician: Ran Thomas III, *  S82.032A (ICD-10-CM) - Closed displaced transverse fracture of left patella, initial encounter    S82.032A (ICD-10-CM) - Displaced transverse fracture of left patella, initial encounter for closed fracture  Physician Orders: 2    Medical Diagnosis: Eval and treat   Date of Surgery: 9/24/19  Evaluation Date: 10/8/2019  Authorization Period Expiration: 12/31/19  Plan of Care Certification Period: 1/28/20  Visit # / Visits authorized: 3/ 20    Precautions: Weightbearing  POSTOPERATIVE PLAN: Remain PWB for 6 weeks, locked out straight x 4 weeks    Time in 1500  Time out 1600  Tx time 50'  Totol tx time 60'      Subjective      Pt reports: min pain in L knee at present time taking tylenol prior to tx today. Pt upset today due to terminated from her job of over 40 years.  Response to previous treatment: min R LE due to SL support in standing  Functional change:  No change    Pain: 2/10  Location: left knee      Objective   Pt entered PT in W/C with Knee brace locked @ 0'    Marielena received therapeutic exercises to develop strength, endurance, ROM, flexibility and core stabilization for 40 minutes including:  Standing w/brace hip abd 3x10  Standing w/brace hip ext 3x10  Standing w/brace weight shifting PWB L LE   Quad set w/brace 3x10  SLR w/brace 3x10  L SL hip abd w/brace 3x10  Prone L hip ext w/brace 3x10  QS w/o brace 15x     Marielena received the following manual therapy techniques: Joint mobilizations, Myofacial release and Soft tissue Mobilization were applied to the: left knee for 10 minutes, including: patella medial/lateral mobs, GSS     Marielena participated in gait training to improve  functional mobility and safety for 10' minutes, including: proper use of crutches, PWB and correct gait pattern.     Home Exercises Provided and Patient Education Provided     Education provided:   PWB with crutches     Written Home Exercises Provided: no .       Assessment     Pt tolerated tx well with min R LE soreness and fatigue. Demonstrating improved quad activation with QS and SLR. VC along with demonstration of PWB with crutches. Pt with good understanding and demonstrated good technique. Continue to progress as tolerated.  Marielena is progressing well towards her goals.   Pt prognosis is Good.     Pt will continue to benefit from skilled outpatient physical therapy to address the deficits listed in the problem list box on initial evaluation, provide pt/family education and to maximize pt's level of independence in the home and community environment.     Pt's spiritual, cultural and educational needs considered and pt agreeable to plan of care and goals.    Anticipated barriers to physical therapy: none     Goals:    In 4-8 weeks, pt. will:  1. Pt will increase ROM to the left knee to 90 deg of flexion,  in order to perform ADLs and IADLs more effectively   2. Decrease overall pain to the left knee to 3-4/10,  on average with daily activities   3. Increase strength to the left LE to 3+/5 grossly throughout,  in order to perform ADLs and IADLs more effectively   4. Improve FOTO intake score to 45 to demonstrate improvement with functional mobility and use  5. Independent with HEP  Long Term GOALS:  In 8-16 weeks, pt. will:  1. Pt will increase ROM to the left knee to WNL,  in order to perform ADLs and IADLs more effectively   2. Decrease overall pain to left knee to 1-2/10,  on average with daily activities   3. Increase strength to the left LE to 4/5,  in order to perform ADLs and IADLs more effectively   4. Improve FOTO intake score to 70 to demonstrate improvement with functional mobility and use  5.  Independent with HEP  6. Return to full recreational and community walking  unrestricted        Plan     Continue therapy as planned and progress accordingly.      Ming Campos, PTA, STS

## 2019-10-17 ENCOUNTER — CLINICAL SUPPORT (OUTPATIENT)
Dept: REHABILITATION | Facility: HOSPITAL | Age: 59
End: 2019-10-17
Payer: COMMERCIAL

## 2019-10-17 DIAGNOSIS — R53.1 WEAKNESS: ICD-10-CM

## 2019-10-17 DIAGNOSIS — M25.60 RANGE OF MOTION DEFICIT: ICD-10-CM

## 2019-10-17 PROCEDURE — 97140 MANUAL THERAPY 1/> REGIONS: CPT

## 2019-10-17 PROCEDURE — 97110 THERAPEUTIC EXERCISES: CPT

## 2019-10-17 NOTE — PROGRESS NOTES
"                            Physical Therapy Daily Treatment Note     Name: Marielena Yang [place]  Clinic Number: [id number]    Therapy Diagnosis:   Encounter Diagnoses   Name Primary?    Range of motion deficit     Weakness      Physician: Ran Thomas III, *    Visit Date: 10/17/2019   Physician: Ran Thomas III, *  S82.032A (ICD-10-CM) - Closed displaced transverse fracture of left patella, initial encounter    S82.032A (ICD-10-CM) - Displaced transverse fracture of left patella, initial encounter for closed fracture  Physician Orders: 2    Medical Diagnosis: Eval and treat   Date of Surgery: 9/24/19  Evaluation Date: 10/8/2019  Authorization Period Expiration: 12/31/19  Plan of Care Certification Period: 1/28/20  Visit # / Visits authorized: 3/ 20    Precautions: Weightbearing  POSTOPERATIVE PLAN: Remain PWB for 6 weeks, locked out straight x 4 weeks    Time in 1050  Time out  1150  Tx time 30'  Totol tx time 60'      Subjective      Pt reports: min pain in L knee along with R knee pain. " I think I did too much standing exercise yesterday".    Response to previous treatment: min R LE due to SL support in standing  Functional change: arriving in W?C    Pain: 2/10  Location: left knee      Objective   Pt entered PT in W/C with Knee brace locked @ 0'    Marielena received therapeutic exercises to develop strength, endurance, ROM, flexibility and core stabilization for 30 minutes including:  Standing w/brace hip abd 3x10  Standing w/brace hip ext 3x10  Standing w/brace weight shifting PWB L LE   SLR w/brace 3x10  L SL hip abd w/brace 3x10  Prone L hip ext w/brace 3x10  QS w/o brace 15x     Marielena received the following manual therapy techniques: Joint mobilizations, Myofacial release and Soft tissue Mobilization were applied to the: left knee for 10 minutes, including: patella medial/lateral mobs, GSS     Marielena participated in gait training to improve functional mobility and safety for 10' minutes, " including: proper use of crutches, PWB and correct gait pattern -ambulating with crutches approx 25' x 2.     RICE for L knee for 10' for decreased pain, edema and circulation   Home Exercises Provided and Patient Education Provided     Education provided:   PWB with crutches     Written Home Exercises Provided: no .       Assessment     Pt tolerated tx well with min R LE soreness and fatigue. Demonstrating improved quad activation with QS and SLR. VC along with demonstration of PWB with crutches. Pt with good understanding and demonstrated good technique. Continue to progress as tolerated.  Marielena is progressing well towards her goals.   Pt prognosis is Good.     Pt will continue to benefit from skilled outpatient physical therapy to address the deficits listed in the problem list box on initial evaluation, provide pt/family education and to maximize pt's level of independence in the home and community environment.     Pt's spiritual, cultural and educational needs considered and pt agreeable to plan of care and goals.    Anticipated barriers to physical therapy: none     Goals:    In 4-8 weeks, pt. will:  1. Pt will increase ROM to the left knee to 90 deg of flexion,  in order to perform ADLs and IADLs more effectively   2. Decrease overall pain to the left knee to 3-4/10,  on average with daily activities   3. Increase strength to the left LE to 3+/5 grossly throughout,  in order to perform ADLs and IADLs more effectively   4. Improve FOTO intake score to 45 to demonstrate improvement with functional mobility and use  5. Independent with HEP  Long Term GOALS:  In 8-16 weeks, pt. will:  1. Pt will increase ROM to the left knee to WNL,  in order to perform ADLs and IADLs more effectively   2. Decrease overall pain to left knee to 1-2/10,  on average with daily activities   3. Increase strength to the left LE to 4/5,  in order to perform ADLs and IADLs more effectively   4. Improve FOTO intake score to 70 to  demonstrate improvement with functional mobility and use  5. Independent with HEP  6. Return to full recreational and community walking  unrestricted        Plan     Continue therapy as planned and progress accordingly.      Ming Campos, PTA, STS

## 2019-10-21 ENCOUNTER — CLINICAL SUPPORT (OUTPATIENT)
Dept: REHABILITATION | Facility: HOSPITAL | Age: 59
End: 2019-10-21
Payer: COMMERCIAL

## 2019-10-21 DIAGNOSIS — R53.1 WEAKNESS: ICD-10-CM

## 2019-10-21 DIAGNOSIS — M25.60 RANGE OF MOTION DEFICIT: ICD-10-CM

## 2019-10-21 PROCEDURE — 97116 GAIT TRAINING THERAPY: CPT

## 2019-10-21 PROCEDURE — 97140 MANUAL THERAPY 1/> REGIONS: CPT

## 2019-10-21 PROCEDURE — 97110 THERAPEUTIC EXERCISES: CPT

## 2019-10-21 NOTE — PROGRESS NOTES
Physical Therapy Daily Treatment Note     Name: Marielena Yang [place]  Clinic Number: [id number]    Therapy Diagnosis:   Encounter Diagnoses   Name Primary?    Range of motion deficit     Weakness      Physician: Ran Thomas III, *    Visit Date: 10/21/2019   Physician: Ran Thomas III, *  S82.032A (ICD-10-CM) - Closed displaced transverse fracture of left patella, initial encounter    S82.032A (ICD-10-CM) - Displaced transverse fracture of left patella, initial encounter for closed fracture  Physician Orders: 2    Medical Diagnosis: Eval and treat   Date of Surgery: 9/24/19  Evaluation Date: 10/8/2019  Authorization Period Expiration: 12/31/19  Plan of Care Certification Period: 1/28/20  Visit # / Visits authorized: 3/ 20    Precautions: Weightbearing  POSTOPERATIVE PLAN: Remain PWB for 6 weeks, locked out straight x 4 weeks    Time in 3:00  Time out  4:00  Tx time 50'  Totol tx time 60'      Subjective      Pt reports: Feels twingy  Response to previous treatment: min R LE due to SL support in standing  Functional change: arriving in W?C    Pain: 2/10  Location: left knee      Objective   Pt entered PT in W/C with Knee brace locked @ 0'    Marielena received therapeutic exercises to develop strength, endurance, ROM, flexibility and core stabilization for 30 minutes including:  Standing w/brace hip abd 3x10  Standing w/brace hip ext 3x10  Standing w/brace weight shifting PWB L LE   SLR w/brace 3x8 w/ QS  L SL hip abd w/brace 3x10  Prone L hip ext w/brace 3x10  QS w/o brace 15x     Marielena received the following manual therapy techniques: Joint mobilizations, Myofacial release and Soft tissue Mobilization were applied to the: left knee for 10 minutes, including: patella medial/lateral mobs, GSS     Marielena participated in gait training to improve functional mobility and safety for 10' minutes, including: proper use of crutches, PWB and correct gait pattern -ambulating with  crutches approx 25' x 2.     RICE for L knee for 10' for decreased pain, edema and circulation   Home Exercises Provided and Patient Education Provided     Education provided:   PWB with crutches     Written Home Exercises Provided: no .       Assessment     Pt shows understanding of PWD w/ crutches when amb. Brace adjusted to prevent sliding. Crutches adjusted to prevent trunk FL. Able transfer I but w/ slight difficulty. MD Visit WED. Continue to progress as tolerated.  Marielena is progressing well towards her goals.   Pt prognosis is Good.     Pt will continue to benefit from skilled outpatient physical therapy to address the deficits listed in the problem list box on initial evaluation, provide pt/family education and to maximize pt's level of independence in the home and community environment.     Pt's spiritual, cultural and educational needs considered and pt agreeable to plan of care and goals.    Anticipated barriers to physical therapy: none     Goals:    In 4-8 weeks, pt. will:  1. Pt will increase ROM to the left knee to 90 deg of flexion,  in order to perform ADLs and IADLs more effectively   2. Decrease overall pain to the left knee to 3-4/10,  on average with daily activities   3. Increase strength to the left LE to 3+/5 grossly throughout,  in order to perform ADLs and IADLs more effectively   4. Improve FOTO intake score to 45 to demonstrate improvement with functional mobility and use  5. Independent with HEP  Long Term GOALS:  In 8-16 weeks, pt. will:  1. Pt will increase ROM to the left knee to WNL,  in order to perform ADLs and IADLs more effectively   2. Decrease overall pain to left knee to 1-2/10,  on average with daily activities   3. Increase strength to the left LE to 4/5,  in order to perform ADLs and IADLs more effectively   4. Improve FOTO intake score to 70 to demonstrate improvement with functional mobility and use  5. Independent with HEP  6. Return to full recreational and community  walking  unrestricted        Plan     Continue therapy as planned and progress accordingly.      Carly Manrique, PTA, STS

## 2019-10-23 ENCOUNTER — OFFICE VISIT (OUTPATIENT)
Dept: SPORTS MEDICINE | Facility: CLINIC | Age: 59
End: 2019-10-23
Payer: COMMERCIAL

## 2019-10-23 VITALS
HEIGHT: 60 IN | SYSTOLIC BLOOD PRESSURE: 127 MMHG | BODY MASS INDEX: 37.69 KG/M2 | WEIGHT: 192 LBS | DIASTOLIC BLOOD PRESSURE: 80 MMHG | HEART RATE: 78 BPM

## 2019-10-23 DIAGNOSIS — S82.032A DISPLACED TRANSVERSE FRACTURE OF LEFT PATELLA, INITIAL ENCOUNTER FOR CLOSED FRACTURE: Primary | ICD-10-CM

## 2019-10-23 PROCEDURE — 99024 PR POST-OP FOLLOW-UP VISIT: ICD-10-PCS | Mod: S$GLB,,, | Performed by: ORTHOPAEDIC SURGERY

## 2019-10-23 PROCEDURE — 99024 POSTOP FOLLOW-UP VISIT: CPT | Mod: S$GLB,,, | Performed by: ORTHOPAEDIC SURGERY

## 2019-10-23 PROCEDURE — 99999 PR PBB SHADOW E&M-EST. PATIENT-LVL III: CPT | Mod: PBBFAC,,, | Performed by: ORTHOPAEDIC SURGERY

## 2019-10-23 PROCEDURE — 99999 PR PBB SHADOW E&M-EST. PATIENT-LVL III: ICD-10-PCS | Mod: PBBFAC,,, | Performed by: ORTHOPAEDIC SURGERY

## 2019-10-23 NOTE — PROGRESS NOTES
HISTORY OF PRESENT ILLNESS:   Pt is here today for post-operative followup of knee surgery.  she is doing well.  We have reviewed her findings and discussed plan of care and future treatment options.       She is wearing her T-scope brace with no issues. Pain well controlled.      DATE OF PROCEDURE: 9/24/2019      SURGEON: Danielle Cloud M.D.     OPERATION:   1.  Partial inferior pole patellectomy of the left patella with patellar tendon advancement/reconstruction CPT 65669 - 22 modifier                                                                          PHYSICAL EXAMINATION:     Incision sites healed well  No evidence of any erythema, infection or induration  Range of motion Not tested  Minimal effusion  2+ DP pulse  No swelling, no calf tenderness  - Hellen's sign  Negative medial joint line tendernes  Moderate quad atrophy    Xrays:  Xrays of the 2 view left knee were ordered and reviewed by me today. No fracture, subluxation, or other significant bony or joint abnormality is identified. Post-operative changes seen. Patella with normal position today.                                                                                  ASSESSMENT:                                                                                                                                               1. Status post above, doing well.                                                                                                                               PLAN:                                                                                                                                                     1. Continue with PT and HEP. Locked at 0 degrees for another 2-4 weeks per evelyn  2. Emphasized quad function.  3. I have discussed return to activity in detail.  4.Patient will see us back at in 6 weeks  5. All questions were answered and patient should contact us if she  has any questions or concerns in the interim.

## 2019-10-24 ENCOUNTER — CLINICAL SUPPORT (OUTPATIENT)
Dept: REHABILITATION | Facility: HOSPITAL | Age: 59
End: 2019-10-24
Payer: COMMERCIAL

## 2019-10-24 ENCOUNTER — PATIENT MESSAGE (OUTPATIENT)
Dept: ADMINISTRATIVE | Facility: OTHER | Age: 59
End: 2019-10-24

## 2019-10-24 DIAGNOSIS — R53.1 WEAKNESS: ICD-10-CM

## 2019-10-24 DIAGNOSIS — M25.60 RANGE OF MOTION DEFICIT: ICD-10-CM

## 2019-10-24 PROCEDURE — 97140 MANUAL THERAPY 1/> REGIONS: CPT

## 2019-10-24 PROCEDURE — 97110 THERAPEUTIC EXERCISES: CPT

## 2019-10-24 NOTE — PROGRESS NOTES
Physical Therapy Daily Treatment Note     Name: Marielena Yang [place]  Clinic Number: [id number]    Therapy Diagnosis:   Encounter Diagnoses   Name Primary?    Range of motion deficit     Weakness      Physician: Ran Thomas III, *    Visit Date: 10/24/2019   Physician: Ran Thomas III, *  S82.032A (ICD-10-CM) - Closed displaced transverse fracture of left patella, initial encounter    S82.032A (ICD-10-CM) - Displaced transverse fracture of left patella, initial encounter for closed fracture  Physician Orders: 2    Medical Diagnosis: Eval and treat   Date of Surgery: 9/24/19  Evaluation Date: 10/8/2019  Authorization Period Expiration: 12/31/19  Plan of Care Certification Period: 1/28/20  Visit # / Visits authorized: 4/ 20    Precautions: Weightbearing  POSTOPERATIVE PLAN: Remain PWB for 6 weeks, locked out straight x 4 weeks    Time in 1455  Time out  1555  Tx time 50'  Totol tx time 60'      Subjective      Pt reports: No pain but c/o bad night sleep do to discomfort. Stated bad day today.   Response to previous treatment: min R LE due to SL support in standing  Functional change: W/C and crutches     Pain: 0/10  Location: left knee      Objective   Pt entered PT in W/C with Knee brace locked @ 0'    Marielena received therapeutic exercises to develop strength, endurance, ROM, flexibility and core stabilization for 30 minutes including:  Standing w/brace hip abd 3x10  Standing w/brace hip ext 3x10  Standing w/brace hip flex 3x10  Standing w/brace weight shifting PWB L LE 5'  SLR w/brace 3x10  L SL hip abd w/brace 3x10  Prone L hip ext w/brace 3x10  QS w/o brace 15x     Marielena received the following manual therapy techniques: Joint mobilizations, Myofacial release and Soft tissue Mobilization were applied to the: left knee for 10 minutes, including: patella medial/lateral mobs, GSS     Marielena participated in gait training to improve functional mobility and safety for 10'  minutes, including: proper use of crutches, PWB and correct gait pattern -ambulating with crutches approx 25' x 2.     RICE for L knee for 10' for decreased pain, edema and circulation       Home Exercises Provided and Patient Education Provided     Education provided:   PWB with crutches     Written Home Exercises Provided: no .       Assessment     Pt shows understanding of PWD w/ crutches when amb. Brace adjusted to prevent sliding. Crutches adjusted to prevent trunk FL. Able transfer I but w/ slight difficulty. MD Visit WED. Continue to progress as tolerated.  Marielena is progressing well towards her goals.   Pt prognosis is Good.     Pt will continue to benefit from skilled outpatient physical therapy to address the deficits listed in the problem list box on initial evaluation, provide pt/family education and to maximize pt's level of independence in the home and community environment.     Pt's spiritual, cultural and educational needs considered and pt agreeable to plan of care and goals.    Anticipated barriers to physical therapy: none     Goals:    In 4-8 weeks, pt. will:  1. Pt will increase ROM to the left knee to 90 deg of flexion,  in order to perform ADLs and IADLs more effectively   2. Decrease overall pain to the left knee to 3-4/10,  on average with daily activities   3. Increase strength to the left LE to 3+/5 grossly throughout,  in order to perform ADLs and IADLs more effectively   4. Improve FOTO intake score to 45 to demonstrate improvement with functional mobility and use  5. Independent with HEP  Long Term GOALS:  In 8-16 weeks, pt. will:  1. Pt will increase ROM to the left knee to WNL,  in order to perform ADLs and IADLs more effectively   2. Decrease overall pain to left knee to 1-2/10,  on average with daily activities   3. Increase strength to the left LE to 4/5,  in order to perform ADLs and IADLs more effectively   4. Improve FOTO intake score to 70 to demonstrate improvement with  functional mobility and use  5. Independent with HEP  6. Return to full recreational and community walking  unrestricted        Plan     Continue therapy as planned and progress accordingly.      Ming Campos, PTA, STS

## 2019-10-28 ENCOUNTER — TELEPHONE (OUTPATIENT)
Dept: SPORTS MEDICINE | Facility: CLINIC | Age: 59
End: 2019-10-28

## 2019-10-28 NOTE — TELEPHONE ENCOUNTER
Received [employer] Disability and Leave forms. Forms completed and faxed back to them at [phone].    [facility]

## 2019-10-29 ENCOUNTER — CLINICAL SUPPORT (OUTPATIENT)
Dept: REHABILITATION | Facility: HOSPITAL | Age: 59
End: 2019-10-29
Payer: COMMERCIAL

## 2019-10-29 DIAGNOSIS — R53.1 WEAKNESS: ICD-10-CM

## 2019-10-29 DIAGNOSIS — M25.60 RANGE OF MOTION DEFICIT: ICD-10-CM

## 2019-10-29 PROCEDURE — 97110 THERAPEUTIC EXERCISES: CPT

## 2019-10-30 ENCOUNTER — PATIENT MESSAGE (OUTPATIENT)
Dept: SPORTS MEDICINE | Facility: CLINIC | Age: 59
End: 2019-10-30

## 2019-10-30 ENCOUNTER — TELEPHONE (OUTPATIENT)
Dept: SPORTS MEDICINE | Facility: CLINIC | Age: 59
End: 2019-10-30

## 2019-10-30 DIAGNOSIS — G89.18 POST-OPERATIVE PAIN: Primary | ICD-10-CM

## 2019-10-30 DIAGNOSIS — S82.032A DISPLACED TRANSVERSE FRACTURE OF LEFT PATELLA, INITIAL ENCOUNTER FOR CLOSED FRACTURE: ICD-10-CM

## 2019-10-30 RX ORDER — OXYCODONE AND ACETAMINOPHEN 5; 325 MG/1; MG/1
1 TABLET ORAL EVERY 12 HOURS PRN
Qty: 14 TABLET | Refills: 0 | Status: SHIPPED | OUTPATIENT
Start: 2019-10-30 | End: 2019-11-20

## 2019-10-30 NOTE — TELEPHONE ENCOUNTER
----- Message from Kaleigh Banda sent at 10/30/2019 12:32 PM CDT -----  Contact: Jenny/Vito negro  Please call Jenny at [phone]    Fax# [phone]    Claim# [id number]    Please send the most recent office notes    Thank you

## 2019-10-31 ENCOUNTER — CLINICAL SUPPORT (OUTPATIENT)
Dept: REHABILITATION | Facility: HOSPITAL | Age: 59
End: 2019-10-31
Payer: COMMERCIAL

## 2019-10-31 DIAGNOSIS — M25.60 RANGE OF MOTION DEFICIT: ICD-10-CM

## 2019-10-31 DIAGNOSIS — R53.1 WEAKNESS: ICD-10-CM

## 2019-10-31 PROCEDURE — 97110 THERAPEUTIC EXERCISES: CPT

## 2019-10-31 PROCEDURE — 97140 MANUAL THERAPY 1/> REGIONS: CPT

## 2019-10-31 NOTE — PROGRESS NOTES
Physical Therapy Daily Treatment Note     Name: Marielena Yang [place]  Clinic Number: [id number]    Therapy Diagnosis:   Encounter Diagnoses   Name Primary?    Range of motion deficit     Weakness      Physician: Ran Thomas III, *    Visit Date: 10/31/2019   Physician: Ran Thomas III, *  S82.032A (ICD-10-CM) - Closed displaced transverse fracture of left patella, initial encounter    S82.032A (ICD-10-CM) - Displaced transverse fracture of left patella, initial encounter for closed fracture  Physician Orders: 2    Medical Diagnosis: Eval and treat   Date of Surgery: 9/24/19  Evaluation Date: 10/8/2019  Authorization Period Expiration: 12/31/19  Plan of Care Certification Period: 1/28/20  Visit # / Visits authorized: 5/ 20    Precautions: Weightbearing  POSTOPERATIVE PLAN: Remain PWB for 6 weeks, locked out straight x 4 weeks    Time in 1450  Time out  1550  Tx time 50'  Totol tx time 60'      Subjective      Pt reports: min pain today R knee.  Ambulating with brace unlocked.  Response to previous treatment: soreness   Functional change: crutches and brace unlocked    Pain: 0/10  Location: left knee      Objective   Pt entered PT in W/C with Knee brace locked 90'    Marielena received therapeutic exercises to develop strength, endurance, ROM, flexibility and core stabilization for 30 minutes including:  Standing w/brace hip abd 3x10  Standing w/brace hip ext 3x10  Standing w/brace hip flex 3x10  Standing w/brace weight shifting PWB L LE 5'  SLR w/brace unlocked  3x10  L SL hip abd w/brace 3x10  Prone L hip ext w/brace 3x10  QS w/o brace 15x     Marielena received the following manual therapy techniques: Joint mobilizations, Myofacial release and Soft tissue Mobilization were applied to the: left knee for 10 minutes, including: patella medial/lateral mobs, GSS, knee flexion      Marielena participated in gait training to improve functional mobility and safety for 10' minutes,  including: proper use of crutches, PWB and correct gait pattern -ambulating with crutches approx 25' x 2.     RICE for L knee for 10' for decreased pain, edema and circulation       Home Exercises Provided and Patient Education Provided     Education provided:   PWB with crutches     Written Home Exercises Provided: no .       Assessment     Pt tolerating tx well. Ambulating with crutches and knee brace unlocked per Conversation with Supervising PT. Continue with Passive knee flex. VC/TC for correcting form/technique with therex. Continue to progress as tolerated.  Marielena is progressing well towards her goals.   Pt prognosis is Good.     Pt will continue to benefit from skilled outpatient physical therapy to address the deficits listed in the problem list box on initial evaluation, provide pt/family education and to maximize pt's level of independence in the home and community environment.     Pt's spiritual, cultural and educational needs considered and pt agreeable to plan of care and goals.    Anticipated barriers to physical therapy: none     Goals:    In 4-8 weeks, pt. will:  1. Pt will increase ROM to the left knee to 90 deg of flexion,  in order to perform ADLs and IADLs more effectively   2. Decrease overall pain to the left knee to 3-4/10,  on average with daily activities   3. Increase strength to the left LE to 3+/5 grossly throughout,  in order to perform ADLs and IADLs more effectively   4. Improve FOTO intake score to 45 to demonstrate improvement with functional mobility and use  5. Independent with HEP  Long Term GOALS:  In 8-16 weeks, pt. will:  1. Pt will increase ROM to the left knee to WNL,  in order to perform ADLs and IADLs more effectively   2. Decrease overall pain to left knee to 1-2/10,  on average with daily activities   3. Increase strength to the left LE to 4/5,  in order to perform ADLs and IADLs more effectively   4. Improve FOTO intake score to 70 to demonstrate improvement with  functional mobility and use  5. Independent with HEP  6. Return to full recreational and community walking  unrestricted        Plan     Continue therapy as planned and progress accordingly.      Ming Campos, PTA, STS

## 2019-11-01 NOTE — ADDENDUM NOTE
Addendum  created 11/01/19 0457 by Alex Teixeira MD    Intraprocedure Blocks edited, Sign clinical note

## 2019-11-05 ENCOUNTER — PATIENT MESSAGE (OUTPATIENT)
Dept: SPORTS MEDICINE | Facility: CLINIC | Age: 59
End: 2019-11-05

## 2019-11-05 ENCOUNTER — CLINICAL SUPPORT (OUTPATIENT)
Dept: REHABILITATION | Facility: HOSPITAL | Age: 59
End: 2019-11-05
Payer: COMMERCIAL

## 2019-11-05 DIAGNOSIS — M25.60 RANGE OF MOTION DEFICIT: ICD-10-CM

## 2019-11-05 DIAGNOSIS — R53.1 WEAKNESS: ICD-10-CM

## 2019-11-05 PROCEDURE — 97110 THERAPEUTIC EXERCISES: CPT

## 2019-11-05 NOTE — PROGRESS NOTES
Physical Therapy Daily Treatment Note     Name: Marielena Yang [place]  Clinic Number: [id number]    Therapy Diagnosis:   Encounter Diagnoses   Name Primary?    Range of motion deficit     Weakness      Physician: Ran Thomas III, *    Visit Date: 11/5/2019   Physician: Ran Thomas III, *  S82.032A (ICD-10-CM) - Closed displaced transverse fracture of left patella, initial encounter    S82.032A (ICD-10-CM) - Displaced transverse fracture of left patella, initial encounter for closed fracture  Physician Orders: 2    Medical Diagnosis: Eval and treat   Date of Surgery: 9/24/19  Evaluation Date: 10/8/2019  Authorization Period Expiration: 12/31/19  Plan of Care Certification Period: 1/28/20  Visit # / Visits authorized: 6/ 20    Precautions: Weightbearing  POSTOPERATIVE PLAN: Remain PWB for 6 weeks, locked out straight x 4 weeks    Time in 1450  Time out  1550  Tx time 50'  Totol tx time 60'      Subjective      Pt reports: min pain today R knee.  Ambulating with brace unlocked.  Pt stated over did it over the weekend causing increased pain.  Response to previous treatment: soreness   Functional change: crutches and brace unlocked    Pain: 0/10  Location: left knee      Objective   Initiated out brace WB and instructed out of brace at home     Marielena received therapeutic exercises to develop strength, endurance, ROM, flexibility and core stabilization for 50 minutes including:  Standing w/brace hip abd 3x10  Standing w/brace hip ext 3x10  Standing w/brace hip flex 3x10  Standing w/brace weight shifting w/o brace    SLR  3x10  L SL hip abd  3x10  Prone L hip ext  3x10  QS 30x   Stationary bike - rocking 10'    Marielena received the following manual therapy techniques: Joint mobilizations, Myofacial release and Soft tissue Mobilization were applied to the: left knee for 00 minutes, including: patella medial/lateral mobs, GSS, knee flexion       RICE for L knee for 10' for decreased  pain, edema and circulation       Home Exercises Provided and Patient Education Provided     Education provided:   PWB with crutches     Written Home Exercises Provided: no .       Assessment     Pt tolerating tx well. Ambulating with crutches w/o knee brace in clinic FWB w/o problem. improved quad activation with SLR. Tolerating rocking on bike well. VC/TC for correcting form/technique with therex. Continue to progress as tolerated.  Marielena is progressing well towards her goals.   Pt prognosis is Good.     Pt will continue to benefit from skilled outpatient physical therapy to address the deficits listed in the problem list box on initial evaluation, provide pt/family education and to maximize pt's level of independence in the home and community environment.     Pt's spiritual, cultural and educational needs considered and pt agreeable to plan of care and goals.    Anticipated barriers to physical therapy: none     Goals:    In 4-8 weeks, pt. will:  1. Pt will increase ROM to the left knee to 90 deg of flexion,  in order to perform ADLs and IADLs more effectively   2. Decrease overall pain to the left knee to 3-4/10,  on average with daily activities   3. Increase strength to the left LE to 3+/5 grossly throughout,  in order to perform ADLs and IADLs more effectively   4. Improve FOTO intake score to 45 to demonstrate improvement with functional mobility and use  5. Independent with HEP  Long Term GOALS:  In 8-16 weeks, pt. will:  1. Pt will increase ROM to the left knee to WNL,  in order to perform ADLs and IADLs more effectively   2. Decrease overall pain to left knee to 1-2/10,  on average with daily activities   3. Increase strength to the left LE to 4/5,  in order to perform ADLs and IADLs more effectively   4. Improve FOTO intake score to 70 to demonstrate improvement with functional mobility and use  5. Independent with HEP  6. Return to full recreational and community walking  unrestricted        Plan      Continue therapy as planned and progress accordingly.      Ming Campos, PTA, STS

## 2019-11-07 ENCOUNTER — CLINICAL SUPPORT (OUTPATIENT)
Dept: REHABILITATION | Facility: HOSPITAL | Age: 59
End: 2019-11-07
Payer: COMMERCIAL

## 2019-11-07 DIAGNOSIS — R53.1 WEAKNESS: ICD-10-CM

## 2019-11-07 DIAGNOSIS — M25.60 RANGE OF MOTION DEFICIT: ICD-10-CM

## 2019-11-07 PROCEDURE — 97110 THERAPEUTIC EXERCISES: CPT

## 2019-11-07 PROCEDURE — 97140 MANUAL THERAPY 1/> REGIONS: CPT

## 2019-11-12 ENCOUNTER — CLINICAL SUPPORT (OUTPATIENT)
Dept: REHABILITATION | Facility: HOSPITAL | Age: 59
End: 2019-11-12
Payer: COMMERCIAL

## 2019-11-12 DIAGNOSIS — M25.60 RANGE OF MOTION DEFICIT: ICD-10-CM

## 2019-11-12 DIAGNOSIS — R53.1 WEAKNESS: ICD-10-CM

## 2019-11-12 PROCEDURE — 97110 THERAPEUTIC EXERCISES: CPT

## 2019-11-12 NOTE — PROGRESS NOTES
"                            Physical Therapy Daily Treatment Note     Name: Marielena Yang [place]  Clinic Number: [id number]    Therapy Diagnosis:   Encounter Diagnoses   Name Primary?    Range of motion deficit     Weakness      Physician: Ran Thomas III, *    Visit Date: 11/12/2019   Physician: Ran Thomas III, *  S82.032A (ICD-10-CM) - Closed displaced transverse fracture of left patella, initial encounter    S82.032A (ICD-10-CM) - Displaced transverse fracture of left patella, initial encounter for closed fracture  Physician Orders: 2    Medical Diagnosis: Eval and treat   Date of Surgery: 9/24/19  Evaluation Date: 10/8/2019  Authorization Period Expiration: 12/31/19  Plan of Care Certification Period: 1/28/20  Visit # / Visits authorized: 6/ 20    Precautions: Weightbearing  POSTOPERATIVE PLAN: Remain PWB for 6 weeks, locked out straight x 4 weeks    Time in 1450  Time out  1550  Tx time 50'  Totol tx time 60'      Subjective      Pt reports: no today R knee.   Response to previous treatment: soreness B calf   Functional change: ambulating w/o crutches and  brace unlocked     Pain: 0/10  Location: left knee      Objective   Brace donned at home and sleeping per discussion with Supervising PT    Marielena received therapeutic exercises to develop strength, endurance, ROM, flexibility and core stabilization for 50 minutes including:  Standing w/brace hip abd 3x10  Standing w/brace hip ext 3x10  Standing w/brace hip flex 3x10  Standing w/brace weight shifting w/o brace    QS 3x15/3"  SLR  3x15  SAQ 3x15  L SL hip abd  3x10  Prone L hip ext  3x10    Stationary bike - rocking 10' np    Marielena received the following manual therapy techniques: Joint mobilizations, Myofacial release and Soft tissue Mobilization were applied to the: left knee for 5 minutes, including: patella medial/lateral mobs, STM quad/patella tendon    RICE for L knee for 10' for decreased pain, edema and circulation     Home Exercises " Provided and Patient Education Provided     Education provided:   PWB with crutches     Written Home Exercises Provided: no .       Assessment     Pt tolerating tx well.  improved quad activation with SLR tolerating increased reps with min mm fatigue.  VC/TC for correcting form/technique with therex. Continue to progress as tolerated. Discussed with Supervising PT dropping ROM in brace to 30' and ambulating with brace at all times.   Marielena is progressing well towards her goals.   Pt prognosis is Good.     Pt will continue to benefit from skilled outpatient physical therapy to address the deficits listed in the problem list box on initial evaluation, provide pt/family education and to maximize pt's level of independence in the home and community environment.     Pt's spiritual, cultural and educational needs considered and pt agreeable to plan of care and goals.    Anticipated barriers to physical therapy: none     Goals:    In 4-8 weeks, pt. will:  1. Pt will increase ROM to the left knee to 90 deg of flexion,  in order to perform ADLs and IADLs more effectively   2. Decrease overall pain to the left knee to 3-4/10,  on average with daily activities   3. Increase strength to the left LE to 3+/5 grossly throughout,  in order to perform ADLs and IADLs more effectively   4. Improve FOTO intake score to 45 to demonstrate improvement with functional mobility and use  5. Independent with HEP  Long Term GOALS:  In 8-16 weeks, pt. will:  1. Pt will increase ROM to the left knee to WNL,  in order to perform ADLs and IADLs more effectively   2. Decrease overall pain to left knee to 1-2/10,  on average with daily activities   3. Increase strength to the left LE to 4/5,  in order to perform ADLs and IADLs more effectively   4. Improve FOTO intake score to 70 to demonstrate improvement with functional mobility and use  5. Independent with HEP  6. Return to full recreational and community walking  unrestricted        Plan      Continue therapy as planned and progress accordingly.      Ming Campos, PTA, STS

## 2019-11-14 ENCOUNTER — CLINICAL SUPPORT (OUTPATIENT)
Dept: REHABILITATION | Facility: HOSPITAL | Age: 59
End: 2019-11-14
Payer: COMMERCIAL

## 2019-11-14 DIAGNOSIS — R53.1 WEAKNESS: ICD-10-CM

## 2019-11-14 DIAGNOSIS — M25.60 RANGE OF MOTION DEFICIT: ICD-10-CM

## 2019-11-14 PROCEDURE — 97110 THERAPEUTIC EXERCISES: CPT

## 2019-11-14 PROCEDURE — 97140 MANUAL THERAPY 1/> REGIONS: CPT

## 2019-11-14 NOTE — PROGRESS NOTES
Physical Therapy Daily Treatment Note     Name: Marielena Yang [place]  Clinic Number: [id number]    Therapy Diagnosis:   Encounter Diagnoses   Name Primary?    Range of motion deficit     Weakness      Physician: Ran Thomas III, *    Visit Date: 11/14/2019   Physician: Ran Thomas III, *  S82.032A (ICD-10-CM) - Closed displaced transverse fracture of left patella, initial encounter    S82.032A (ICD-10-CM) - Displaced transverse fracture of left patella, initial encounter for closed fracture  Physician Orders: 2    Medical Diagnosis: Eval and treat   Date of Surgery: 9/24/19  Evaluation Date: 10/8/2019  Authorization Period Expiration: 12/31/19  Plan of Care Certification Period: 1/28/20  Visit # / Visits authorized: 7/ 20    Precautions: Weightbearing  POSTOPERATIVE PLAN: Remain PWB for 6 weeks, locked out straight x 4 weeks    Time in 300pm  Time out  400pm   Tx time 55  Totol tx time 60'      Subjective      Pt reports: no today R knee.   Response to previous treatment: soreness B calf   Functional change: ambulating w/o crutches and  brace unlocked     Pain: 0/10  Location: left knee      Objective   Brace unlocked to 40 deg     Marielena received therapeutic exercises to develop strength, endurance, ROM, flexibility and core stabilization for 50 minutes including:  Standing w/brace hip abd 3x10  Standing w/brace hip ext 3x10  Standing w/brace hip flex 3x10  All performed with yellow band   Rocker board squat 0-40 deg 3x10 in brace   Bridge on ball versus 30x   L SL hip abd  3x10  Prone L hip ext  3x10       Marielena received the following manual therapy techniques: Joint mobilizations, Myofacial release and Soft tissue Mobilization were applied to the: left knee for 8 minutes, including: patella medial/lateral mobs, STM quad/patella tendon    RICE for L knee for 10' for decreased pain, edema and circulation     Home Exercises Provided and Patient Education Provided      Education provided:   PWB with crutches     Written Home Exercises Provided: no .       Assessment     Pt tolerating tx well.  Required cueing for gait while in the brace.  Pt wants to maintain a stiff/extended knee throughout gait cycle.  Overall, progressing well.  Continued patella mobs to toleration.    Marielena is progressing well towards her goals.   Pt prognosis is Good.     Pt will continue to benefit from skilled outpatient physical therapy to address the deficits listed in the problem list box on initial evaluation, provide pt/family education and to maximize pt's level of independence in the home and community environment.     Pt's spiritual, cultural and educational needs considered and pt agreeable to plan of care and goals.    Anticipated barriers to physical therapy: none     Goals:    In 4-8 weeks, pt. will:  1. Pt will increase ROM to the left knee to 90 deg of flexion,  in order to perform ADLs and IADLs more effectively   2. Decrease overall pain to the left knee to 3-4/10,  on average with daily activities   3. Increase strength to the left LE to 3+/5 grossly throughout,  in order to perform ADLs and IADLs more effectively   4. Improve FOTO intake score to 45 to demonstrate improvement with functional mobility and use  5. Independent with HEP  Long Term GOALS:  In 8-16 weeks, pt. will:  1. Pt will increase ROM to the left knee to WNL,  in order to perform ADLs and IADLs more effectively   2. Decrease overall pain to left knee to 1-2/10,  on average with daily activities   3. Increase strength to the left LE to 4/5,  in order to perform ADLs and IADLs more effectively   4. Improve FOTO intake score to 70 to demonstrate improvement with functional mobility and use  5. Independent with HEP  6. Return to full recreational and community walking  unrestricted        Plan     Continue therapy as planned and progress accordingly.      Hamilton Mckeon, PT, STS

## 2019-11-15 NOTE — PROGRESS NOTES
Physical Therapy Daily Treatment Note     Name: Marielena Yang [place]  Clinic Number: [id number]    Therapy Diagnosis:   Encounter Diagnoses   Name Primary?    Range of motion deficit     Weakness      Physician: Ran Thomas III, *    Visit Date: 11/7/2019   Physician: Ran Thomas III, *  S82.032A (ICD-10-CM) - Closed displaced transverse fracture of left patella, initial encounter    S82.032A (ICD-10-CM) - Displaced transverse fracture of left patella, initial encounter for closed fracture  Physician Orders: 2    Medical Diagnosis: Eval and treat   Date of Surgery: 9/24/19  Evaluation Date: 10/8/2019  Authorization Period Expiration: 12/31/19  Plan of Care Certification Period: 1/28/20  Visit # / Visits authorized: 6/ 20    Precautions: Weightbearing  POSTOPERATIVE PLAN: Remain PWB for 6 weeks, locked out straight x 4 weeks    Time in 1450  Time out  1550  Tx time 50'  Totol tx time 60'      Subjective      Pt reports: min pain today R knee.  Ambulating with brace unlocked.  Pt stated over did it over the weekend causing increased pain.  Response to previous treatment: soreness   Functional change: crutches and brace unlocked    Pain: 0/10  Location: left knee      Objective   Initiated brace unlocked to 30 deg     Marielena received therapeutic exercises to develop strength, endurance, ROM, flexibility and core stabilization for 50 minutes including:  Standing w/brace hip abd 3x10  Standing w/brace hip ext 3x10  Standing w/brace hip flex 3x10  Standing w/brace weight shifting w/o brace    SLR  3x10  L SL hip abd  3x10  Prone L hip ext  3x10  QS 30x   RoM 0-30 deg in the brace at EOT     Marielena received the following manual therapy techniques: Joint mobilizations, Myofacial release and Soft tissue Mobilization were applied to the: left knee for 00 minutes, including: patella medial/lateral mobs, GSS, knee flexion       RICE for L knee for 10' for decreased pain, edema and  circulation       Home Exercises Provided and Patient Education Provided     Education provided:   PWB with crutches     Written Home Exercises Provided: no .       Assessment     Pt tolerating tx well. Ambulating with crutches w/o knee brace in clinic FWB w/o problem. improved quad activation with SLR.  Pt advised to maintain brace and we will gradually unlock brace weekly to reintroduce motion.  Overall, pt is making good gains with therapy.   Continue to progress as tolerated.  Marielena is progressing well towards her goals.   Pt prognosis is Good.     Pt will continue to benefit from skilled outpatient physical therapy to address the deficits listed in the problem list box on initial evaluation, provide pt/family education and to maximize pt's level of independence in the home and community environment.     Pt's spiritual, cultural and educational needs considered and pt agreeable to plan of care and goals.    Anticipated barriers to physical therapy: none     Goals:    In 4-8 weeks, pt. will:  1. Pt will increase ROM to the left knee to 90 deg of flexion,  in order to perform ADLs and IADLs more effectively   2. Decrease overall pain to the left knee to 3-4/10,  on average with daily activities   3. Increase strength to the left LE to 3+/5 grossly throughout,  in order to perform ADLs and IADLs more effectively   4. Improve FOTO intake score to 45 to demonstrate improvement with functional mobility and use  5. Independent with HEP  Long Term GOALS:  In 8-16 weeks, pt. will:  1. Pt will increase ROM to the left knee to WNL,  in order to perform ADLs and IADLs more effectively   2. Decrease overall pain to left knee to 1-2/10,  on average with daily activities   3. Increase strength to the left LE to 4/5,  in order to perform ADLs and IADLs more effectively   4. Improve FOTO intake score to 70 to demonstrate improvement with functional mobility and use  5. Independent with HEP  6. Return to full recreational and  community walking  unrestricted        Plan     Continue therapy as planned and progress accordingly.      Hamilton Mckeon, PT, STS

## 2019-11-19 ENCOUNTER — CLINICAL SUPPORT (OUTPATIENT)
Dept: REHABILITATION | Facility: HOSPITAL | Age: 59
End: 2019-11-19
Payer: COMMERCIAL

## 2019-11-19 DIAGNOSIS — M25.60 RANGE OF MOTION DEFICIT: ICD-10-CM

## 2019-11-19 DIAGNOSIS — R53.1 WEAKNESS: ICD-10-CM

## 2019-11-19 PROCEDURE — 97140 MANUAL THERAPY 1/> REGIONS: CPT

## 2019-11-19 PROCEDURE — 97110 THERAPEUTIC EXERCISES: CPT

## 2019-11-19 NOTE — PROGRESS NOTES
"                            Physical Therapy Daily Treatment Note     Name: Marielena Yang [place]  Clinic Number: [id number]    Therapy Diagnosis:   Encounter Diagnoses   Name Primary?    Range of motion deficit     Weakness      Physician: Ran Thomas III, *    Visit Date: 11/19/2019   Physician: Ran Thomas III, *  S82.032A (ICD-10-CM) - Closed displaced transverse fracture of left patella, initial encounter    S82.032A (ICD-10-CM) - Displaced transverse fracture of left patella, initial encounter for closed fracture  Physician Orders: 2    Medical Diagnosis: Eval and treat   Date of Surgery: 9/24/19  Evaluation Date: 10/8/2019  Authorization Period Expiration: 12/31/19  Plan of Care Certification Period: 1/28/20  Visit # / Visits authorized: 8/ 20    Precautions: Weightbearing  POSTOPERATIVE PLAN: Remain PWB for 6 weeks, locked out straight x 4 weeks    Time in 1445  Time out  1545  Tx time 45'  Totol tx time 60'      Subjective      Pt reports: min pain R knee, Stated working a lot last week and the weekend along with cooking several time without problems.  Response to previous treatment: soreness B calf   Functional change: ambulating w/o crutches and  brace unlocked 40'    Pain: 0/10  Location: left knee      Objective   Brace unlocked to 40 deg     Marielena received therapeutic exercises to develop strength, endurance, ROM, flexibility and core stabilization for 40 minutes including:  Standing w/brace hip abd 3x10  Standing w/brace hip ext 3x10  Standing w/brace hip flex 3x10  All performed with yellow band   Rocker board squat 0-40 deg 3x10 in brace np  Lateral side stepping L/R 25' x 2 w/brace np  SL L w/QS 3x15/3"  QS 3x15/3"  Bridge on ball versus 30x   L SL hip abd  3x15  Prone L hip ext  3x10  Prone TKE w/bolster 3x10       Marielena received the following manual therapy techniques: Joint mobilizations, Myofacial release and Soft tissue Mobilization were applied to the: left knee for 10 minutes, " including: patella medial/lateral mobs, STM quad/patella tendon with passive knee flex 40-50'.    RICE for L knee for 10' for decreased pain, edema and circulation     Home Exercises Provided and Patient Education Provided     Education provided:   PWB with crutches     Written Home Exercises Provided: no .       Assessment     Pt tolerating tx well.  Required cueing for gait while in the brace.  Pt wants to maintain a stiff/extended knee throughout gait cycle.  Overall, progressing well.  Continued patella mobs to toleration.    Marielena is progressing well towards her goals.   Pt prognosis is Good.     Pt will continue to benefit from skilled outpatient physical therapy to address the deficits listed in the problem list box on initial evaluation, provide pt/family education and to maximize pt's level of independence in the home and community environment.     Pt's spiritual, cultural and educational needs considered and pt agreeable to plan of care and goals.    Anticipated barriers to physical therapy: none     Goals:    In 4-8 weeks, pt. will:  1. Pt will increase ROM to the left knee to 90 deg of flexion,  in order to perform ADLs and IADLs more effectively   2. Decrease overall pain to the left knee to 3-4/10,  on average with daily activities   3. Increase strength to the left LE to 3+/5 grossly throughout,  in order to perform ADLs and IADLs more effectively   4. Improve FOTO intake score to 45 to demonstrate improvement with functional mobility and use  5. Independent with HEP  Long Term GOALS:  In 8-16 weeks, pt. will:  1. Pt will increase ROM to the left knee to WNL,  in order to perform ADLs and IADLs more effectively   2. Decrease overall pain to left knee to 1-2/10,  on average with daily activities   3. Increase strength to the left LE to 4/5,  in order to perform ADLs and IADLs more effectively   4. Improve FOTO intake score to 70 to demonstrate improvement with functional mobility and use  5.  Independent with HEP  6. Return to full recreational and community walking  unrestricted        Plan     Continue therapy as planned and progress accordingly.      Ming Campos, PTA, STS

## 2019-11-20 ENCOUNTER — PATIENT MESSAGE (OUTPATIENT)
Dept: INTERNAL MEDICINE | Facility: CLINIC | Age: 59
End: 2019-11-20

## 2019-11-20 ENCOUNTER — OFFICE VISIT (OUTPATIENT)
Dept: INTERNAL MEDICINE | Facility: CLINIC | Age: 59
End: 2019-11-20
Payer: COMMERCIAL

## 2019-11-20 ENCOUNTER — IMMUNIZATION (OUTPATIENT)
Dept: INTERNAL MEDICINE | Facility: CLINIC | Age: 59
End: 2019-11-20
Payer: COMMERCIAL

## 2019-11-20 VITALS
HEART RATE: 80 BPM | DIASTOLIC BLOOD PRESSURE: 78 MMHG | BODY MASS INDEX: 33.42 KG/M2 | OXYGEN SATURATION: 98 % | WEIGHT: 195.75 LBS | HEIGHT: 64 IN | SYSTOLIC BLOOD PRESSURE: 124 MMHG

## 2019-11-20 DIAGNOSIS — E78.5 HYPERLIPIDEMIA, UNSPECIFIED HYPERLIPIDEMIA TYPE: ICD-10-CM

## 2019-11-20 DIAGNOSIS — F32.A ANXIETY AND DEPRESSION: Primary | ICD-10-CM

## 2019-11-20 DIAGNOSIS — G47.00 INSOMNIA, UNSPECIFIED TYPE: ICD-10-CM

## 2019-11-20 DIAGNOSIS — F41.9 ANXIETY AND DEPRESSION: Primary | ICD-10-CM

## 2019-11-20 DIAGNOSIS — S82.032D CLOSED DISPLACED TRANSVERSE FRACTURE OF LEFT PATELLA WITH ROUTINE HEALING, SUBSEQUENT ENCOUNTER: ICD-10-CM

## 2019-11-20 PROCEDURE — 3074F SYST BP LT 130 MM HG: CPT | Mod: CPTII,S$GLB,, | Performed by: INTERNAL MEDICINE

## 2019-11-20 PROCEDURE — 3008F PR BODY MASS INDEX (BMI) DOCUMENTED: ICD-10-PCS | Mod: CPTII,S$GLB,, | Performed by: INTERNAL MEDICINE

## 2019-11-20 PROCEDURE — 90471 IMMUNIZATION ADMIN: CPT | Mod: S$GLB,,, | Performed by: INTERNAL MEDICINE

## 2019-11-20 PROCEDURE — 90686 FLU VACCINE (QUAD) GREATER THAN OR EQUAL TO 3YO PRESERVATIVE FREE IM: ICD-10-PCS | Mod: S$GLB,,, | Performed by: INTERNAL MEDICINE

## 2019-11-20 PROCEDURE — 99214 PR OFFICE/OUTPT VISIT, EST, LEVL IV, 30-39 MIN: ICD-10-PCS | Mod: 25,S$GLB,, | Performed by: INTERNAL MEDICINE

## 2019-11-20 PROCEDURE — 99214 OFFICE O/P EST MOD 30 MIN: CPT | Mod: 25,S$GLB,, | Performed by: INTERNAL MEDICINE

## 2019-11-20 PROCEDURE — 3078F DIAST BP <80 MM HG: CPT | Mod: CPTII,S$GLB,, | Performed by: INTERNAL MEDICINE

## 2019-11-20 PROCEDURE — 3078F PR MOST RECENT DIASTOLIC BLOOD PRESSURE < 80 MM HG: ICD-10-PCS | Mod: CPTII,S$GLB,, | Performed by: INTERNAL MEDICINE

## 2019-11-20 PROCEDURE — 90686 IIV4 VACC NO PRSV 0.5 ML IM: CPT | Mod: S$GLB,,, | Performed by: INTERNAL MEDICINE

## 2019-11-20 PROCEDURE — 3074F PR MOST RECENT SYSTOLIC BLOOD PRESSURE < 130 MM HG: ICD-10-PCS | Mod: CPTII,S$GLB,, | Performed by: INTERNAL MEDICINE

## 2019-11-20 PROCEDURE — 3008F BODY MASS INDEX DOCD: CPT | Mod: CPTII,S$GLB,, | Performed by: INTERNAL MEDICINE

## 2019-11-20 PROCEDURE — 90471 FLU VACCINE (QUAD) GREATER THAN OR EQUAL TO 3YO PRESERVATIVE FREE IM: ICD-10-PCS | Mod: S$GLB,,, | Performed by: INTERNAL MEDICINE

## 2019-11-20 PROCEDURE — 99999 PR PBB SHADOW E&M-EST. PATIENT-LVL IV: CPT | Mod: PBBFAC,,, | Performed by: INTERNAL MEDICINE

## 2019-11-20 PROCEDURE — 99999 PR PBB SHADOW E&M-EST. PATIENT-LVL IV: ICD-10-PCS | Mod: PBBFAC,,, | Performed by: INTERNAL MEDICINE

## 2019-11-20 RX ORDER — HYDROXYZINE HYDROCHLORIDE 25 MG/1
TABLET, FILM COATED ORAL
Qty: 30 TABLET | Refills: 1 | Status: SHIPPED | OUTPATIENT
Start: 2019-11-20 | End: 2019-12-12 | Stop reason: SDUPTHER

## 2019-11-20 RX ORDER — LORAZEPAM 1 MG/1
TABLET ORAL
Qty: 30 TABLET | Refills: 5 | Status: SHIPPED | OUTPATIENT
Start: 2019-11-20 | End: 2020-05-04 | Stop reason: SDUPTHER

## 2019-11-20 NOTE — PROGRESS NOTES
Subjective:       Patient ID: Marielena Tracy is a 59 y.o. female.    Chief Complaint: Medication Refill (Flu Shot)    HPI:  Patient is in the rehab phase of her left patella fracture surgery.  She is in an immobilizer and the degree of movement is being slowly increased.  About a month after surgery she found out that her job was eliminated so even though she still on some compensation for work when she is recovered she does not have a job to go back to.  She says this has caused some increase in anxiety and extreme difficulty sleeping.  She stopped pain medicines and does not want to go back to those just to help with sleep and she is not drinking alcohol however she is concerned.  She tried over-the-counter sleep aids and says the lorazepam which she has taken for anxiety for a while is not helping so we talked about trying something sparingly such as hydroxyzine.  We talked about risks and benefits.  She said she also believes she has PTSD after her fall.  She does not want a shower without her  being alone.  She says if she sees someone with her shoes and tied she will stop them and asked them to tighter shoes.  She hopes this will get better with time but for now the anxiety in the trouble sleeping is very disconcerting.    Review of Systems   Constitutional: Positive for activity change. Negative for unexpected weight change.   HENT: Negative for hearing loss, rhinorrhea and trouble swallowing.    Eyes: Negative for discharge and visual disturbance.   Respiratory: Negative for chest tightness and wheezing.    Cardiovascular: Negative for chest pain and palpitations.   Gastrointestinal: Negative for blood in stool, constipation, diarrhea and vomiting.   Endocrine: Negative for polydipsia and polyuria.   Genitourinary: Negative for difficulty urinating, dysuria, hematuria and menstrual problem.   Musculoskeletal: Positive for joint swelling (Left knee postsurgical). Negative for arthralgias and  neck pain.   Neurological: Negative for weakness and headaches.   Psychiatric/Behavioral: Positive for dysphoric mood and sleep disturbance. Negative for confusion. The patient is nervous/anxious.        Objective:      Physical Exam   Constitutional: She is oriented to person, place, and time. She appears well-developed and well-nourished.   HENT:   Head: Normocephalic and atraumatic.   Cardiovascular: Normal rate and regular rhythm.   Pulmonary/Chest: Effort normal and breath sounds normal.   Musculoskeletal: She exhibits tenderness (Mild tenderness with knee movement.) and deformity ( no redness.  Minimal normal swelling post surgery of the knee.  Incision is healing nicely.  No drainage or redness.).   Neurological: She is alert and oriented to person, place, and time. No cranial nerve deficit. Coordination normal.   Skin: Skin is warm and dry. Capillary refill takes less than 2 seconds. No rash noted. No erythema.   Psychiatric: Her behavior is normal. Thought content normal.   Patient does start tearing up a bit during our discussion about her recovery from her surgery, her job and difficulty sleeping.       Assessment:       1. Anxiety and depression    2. Insomnia, unspecified type    3. Closed displaced transverse fracture of left patella with routine healing, subsequent encounter    4. Hyperlipidemia, unspecified hyperlipidemia type        Plan:       Marielena was seen today for medication refill.    Diagnoses and all orders for this visit:    Anxiety and depression    Insomnia, unspecified type  -     hydrOXYzine HCl (ATARAX) 25 MG tablet; 1/2 to 1 tab nightly as needed for insomnia    Closed displaced transverse fracture of left patella with routine healing, subsequent encounter    Hyperlipidemia, unspecified hyperlipidemia type    Other orders  -     LORazepam (ATIVAN) 1 MG tablet; TAKE 1 TABLET (1 MG TOTAL) BY MOUTH EVERY EVENING.

## 2019-11-21 ENCOUNTER — CLINICAL SUPPORT (OUTPATIENT)
Dept: REHABILITATION | Facility: HOSPITAL | Age: 59
End: 2019-11-21
Payer: COMMERCIAL

## 2019-11-21 DIAGNOSIS — M25.60 RANGE OF MOTION DEFICIT: ICD-10-CM

## 2019-11-21 DIAGNOSIS — R53.1 WEAKNESS: ICD-10-CM

## 2019-11-21 PROCEDURE — 97110 THERAPEUTIC EXERCISES: CPT

## 2019-11-21 PROCEDURE — 97140 MANUAL THERAPY 1/> REGIONS: CPT

## 2019-11-22 NOTE — PROGRESS NOTES
"                            Physical Therapy Daily Treatment Note     Name: Marielena Yang [place]  Clinic Number: [id number]    Therapy Diagnosis:   Encounter Diagnoses   Name Primary?    Range of motion deficit     Weakness      Physician: Ran Thomas III, *    Visit Date: 11/21/2019   Physician: Ran Thomas III, *  S82.032A (ICD-10-CM) - Closed displaced transverse fracture of left patella, initial encounter    S82.032A (ICD-10-CM) - Displaced transverse fracture of left patella, initial encounter for closed fracture  Physician Orders: 2    Medical Diagnosis: Eval and treat   Date of Surgery: 9/24/19  Evaluation Date: 10/8/2019  Authorization Period Expiration: 12/31/19  Plan of Care Certification Period: 1/28/20  Visit # / Visits authorized: 9/ 20    Precautions: Weightbearing  POSTOPERATIVE PLAN: Remain PWB for 6 weeks, locked out straight x 4 weeks    Time in 1445  Time out  1545  Tx time 45'  Totol tx time 60'      Subjective      Pt reports: min pain R knee, Stated working a lot last week and the weekend along with cooking several time without problems.  Response to previous treatment: soreness B calf   Functional change: ambulating w/o crutches and  brace unlocked 40'    Pain: 0/10  Location: left knee      Objective   Brace unlocked to 40 deg     Marielena received therapeutic exercises to develop strength, endurance, ROM, flexibility and core stabilization for 45 minutes including:  Standing w/brace hip abd 3x10  Standing w/brace hip ext 3x10  Standing w/brace hip flex 3x10  All performed with yellow band      Squat 30x   SL L w/QS 3x15/3"  QS 3x15/3"  Bridge on ball versus 30x   L SL hip abd  3x15  Prone L hip ext  3x10  Prone TKE w/bolster 3x10       Marielena received the following manual therapy techniques: Joint mobilizations, Myofacial release and Soft tissue Mobilization were applied to the: left knee for 10 minutes, including: patella medial/lateral mobs, STM quad/patella tendon with passive " "knee flex 40-50'.    RICE for L knee for 10' for decreased pain, edema and circulation     Home Exercises Provided and Patient Education Provided     Education provided:   PWB with crutches     Written Home Exercises Provided: no .       Assessment     Pt tolerating tx well.  Walking much better in brace.  Overall pt progressing well.  Notes some "burning" in her scar with bending, which may be normal at this time during the healing process.  Otherwise no complaints.  We will continue opening brace 10 deg per week.    Marielena is progressing well towards her goals.   Pt prognosis is Good.     Pt will continue to benefit from skilled outpatient physical therapy to address the deficits listed in the problem list box on initial evaluation, provide pt/family education and to maximize pt's level of independence in the home and community environment.     Pt's spiritual, cultural and educational needs considered and pt agreeable to plan of care and goals.    Anticipated barriers to physical therapy: none     Goals:    In 4-8 weeks, pt. will:  1. Pt will increase ROM to the left knee to 90 deg of flexion,  in order to perform ADLs and IADLs more effectively   2. Decrease overall pain to the left knee to 3-4/10,  on average with daily activities   3. Increase strength to the left LE to 3+/5 grossly throughout,  in order to perform ADLs and IADLs more effectively   4. Improve FOTO intake score to 45 to demonstrate improvement with functional mobility and use  5. Independent with HEP  Long Term GOALS:  In 8-16 weeks, pt. will:  1. Pt will increase ROM to the left knee to WNL,  in order to perform ADLs and IADLs more effectively   2. Decrease overall pain to left knee to 1-2/10,  on average with daily activities   3. Increase strength to the left LE to 4/5,  in order to perform ADLs and IADLs more effectively   4. Improve FOTO intake score to 70 to demonstrate improvement with functional mobility and use  5. Independent with " HEP  6. Return to full recreational and community walking  unrestricted        Plan     Continue therapy as planned and progress accordingly.      Hamilton Mckeon, PT, STS

## 2019-11-23 ENCOUNTER — PATIENT MESSAGE (OUTPATIENT)
Dept: ADMINISTRATIVE | Facility: OTHER | Age: 59
End: 2019-11-23

## 2019-11-26 ENCOUNTER — CLINICAL SUPPORT (OUTPATIENT)
Dept: REHABILITATION | Facility: HOSPITAL | Age: 59
End: 2019-11-26
Payer: COMMERCIAL

## 2019-11-26 DIAGNOSIS — R53.1 WEAKNESS: ICD-10-CM

## 2019-11-26 DIAGNOSIS — M25.60 RANGE OF MOTION DEFICIT: ICD-10-CM

## 2019-11-26 PROCEDURE — 97110 THERAPEUTIC EXERCISES: CPT

## 2019-11-26 PROCEDURE — 97140 MANUAL THERAPY 1/> REGIONS: CPT

## 2019-11-27 NOTE — PROGRESS NOTES
"                            Physical Therapy Daily Treatment Note     Name: Marielena Yang [place]  Clinic Number: [id number]    Therapy Diagnosis:   Encounter Diagnoses   Name Primary?    Range of motion deficit     Weakness      Physician: Ran Thomas III, *    Visit Date: 11/26/2019   Physician: Ran Thomas III, *  S82.032A (ICD-10-CM) - Closed displaced transverse fracture of left patella, initial encounter    S82.032A (ICD-10-CM) - Displaced transverse fracture of left patella, initial encounter for closed fracture  Physician Orders: 2    Medical Diagnosis: Eval and treat   Date of Surgery: 9/24/19  Evaluation Date: 10/8/2019  Authorization Period Expiration: 12/31/19  Plan of Care Certification Period: 1/28/20  Visit # / Visits authorized: 10/ 20    Precautions: Weightbearing  POSTOPERATIVE PLAN: Remain PWB for 6 weeks, locked out straight x 4 weeks    Time in 1445  Time out  1545  Tx time 45'  Totol tx time 60'      Subjective      Pt reports: min pain R knee, overall good  Response to previous treatment: soreness B calf   Functional change: ambulating w/o crutches and  brace unlocked 50', but it should be noted that she does keep her knee straight during ambulation     Pain: 0/10  Location: left knee      Objective   Brace unlocked to 40 deg     Marielena received therapeutic exercises to develop strength, endurance, ROM, flexibility and core stabilization for 45 minutes including:  Standing w/brace hip abd 3x10  Standing w/brace hip ext 3x10  Standing w/brace hip flex 3x10  All performed with yellow band      Squat 30x 0-60  SL L w/QS 3x15/3" 2lbs  Bridge on ball versus 30x   L SL hip abd  3x15 2lbs  Prone L hip ext  3x10  Prone TKE w/bolster 3x10       Marielena received the following manual therapy techniques: Joint mobilizations, Myofacial release and Soft tissue Mobilization were applied to the: left knee for 10 minutes, including: patella medial/lateral mobs, STM quad/patella tendon with passive " knee flex 40-50'.    RICE for L knee for 10' for decreased pain, edema and circulation     Home Exercises Provided and Patient Education Provided     Education provided:   PWB with crutches     Written Home Exercises Provided: no .       Assessment     Pt tolerating tx well.  I did unlock her brace to 70 degrees and advised her to work on walking with more knee flexion.  We did practice this in the gym and she was able to perform quite well.  Continued strengthening in the new avail range.  Also, we did spend time with passive ROM to introduce new ranges slowly.    Marielena is progressing well towards her goals.   Pt prognosis is Good.     Pt will continue to benefit from skilled outpatient physical therapy to address the deficits listed in the problem list box on initial evaluation, provide pt/family education and to maximize pt's level of independence in the home and community environment.     Pt's spiritual, cultural and educational needs considered and pt agreeable to plan of care and goals.    Anticipated barriers to physical therapy: none     Goals:    In 4-8 weeks, pt. will:  1. Pt will increase ROM to the left knee to 90 deg of flexion,  in order to perform ADLs and IADLs more effectively   2. Decrease overall pain to the left knee to 3-4/10,  on average with daily activities   3. Increase strength to the left LE to 3+/5 grossly throughout,  in order to perform ADLs and IADLs more effectively   4. Improve FOTO intake score to 45 to demonstrate improvement with functional mobility and use  5. Independent with HEP  Long Term GOALS:  In 8-16 weeks, pt. will:  1. Pt will increase ROM to the left knee to WNL,  in order to perform ADLs and IADLs more effectively   2. Decrease overall pain to left knee to 1-2/10,  on average with daily activities   3. Increase strength to the left LE to 4/5,  in order to perform ADLs and IADLs more effectively   4. Improve FOTO intake score to 70 to demonstrate improvement with  functional mobility and use  5. Independent with HEP  6. Return to full recreational and community walking  unrestricted        Plan     Continue therapy as planned and progress accordingly.      Hamilton Mckeon, PT, STS

## 2019-12-03 ENCOUNTER — CLINICAL SUPPORT (OUTPATIENT)
Dept: REHABILITATION | Facility: HOSPITAL | Age: 59
End: 2019-12-03
Payer: COMMERCIAL

## 2019-12-03 DIAGNOSIS — M25.60 RANGE OF MOTION DEFICIT: ICD-10-CM

## 2019-12-03 DIAGNOSIS — R53.1 WEAKNESS: ICD-10-CM

## 2019-12-03 PROCEDURE — 97140 MANUAL THERAPY 1/> REGIONS: CPT

## 2019-12-03 PROCEDURE — 97110 THERAPEUTIC EXERCISES: CPT

## 2019-12-04 ENCOUNTER — OFFICE VISIT (OUTPATIENT)
Dept: SPORTS MEDICINE | Facility: CLINIC | Age: 59
End: 2019-12-04
Payer: COMMERCIAL

## 2019-12-04 VITALS
BODY MASS INDEX: 33.29 KG/M2 | WEIGHT: 195 LBS | SYSTOLIC BLOOD PRESSURE: 140 MMHG | DIASTOLIC BLOOD PRESSURE: 78 MMHG | HEIGHT: 64 IN | HEART RATE: 80 BPM

## 2019-12-04 DIAGNOSIS — S86.812S PATELLAR TENDON RUPTURE, LEFT, SEQUELA: Primary | ICD-10-CM

## 2019-12-04 PROCEDURE — 99024 POSTOP FOLLOW-UP VISIT: CPT | Mod: S$GLB,,, | Performed by: ORTHOPAEDIC SURGERY

## 2019-12-04 PROCEDURE — 99024 PR POST-OP FOLLOW-UP VISIT: ICD-10-PCS | Mod: S$GLB,,, | Performed by: ORTHOPAEDIC SURGERY

## 2019-12-04 PROCEDURE — 99999 PR PBB SHADOW E&M-EST. PATIENT-LVL III: CPT | Mod: PBBFAC,,, | Performed by: ORTHOPAEDIC SURGERY

## 2019-12-04 PROCEDURE — 99999 PR PBB SHADOW E&M-EST. PATIENT-LVL III: ICD-10-PCS | Mod: PBBFAC,,, | Performed by: ORTHOPAEDIC SURGERY

## 2019-12-04 NOTE — PROGRESS NOTES
"                            Physical Therapy Re assessment Note     Name: Marielena Yang [place]  Clinic Number: [id number]    Therapy Diagnosis:   Encounter Diagnoses   Name Primary?    Range of motion deficit     Weakness      Physician: Ran Thomas III, *    Visit Date: 12/3/2019   Physician: Ran Thomas III, *  S82.032A (ICD-10-CM) - Closed displaced transverse fracture of left patella, initial encounter    S82.032A (ICD-10-CM) - Displaced transverse fracture of left patella, initial encounter for closed fracture  Physician Orders: 2    Medical Diagnosis: Eval and treat   Date of Surgery: 9/24/19  Evaluation Date: 10/8/2019  Authorization Period Expiration: 12/31/19  Plan of Care Certification Period: 1/28/20  Visit # / Visits authorized: 10/ 20    Precautions: Weightbearing  POSTOPERATIVE PLAN: Remain PWB for 6 weeks, locked out straight x 4 weeks    Time in 1445  Time out  1545  Tx time 45'  Totol tx time 60'      Subjective      Pt reports: min pain R knee, overall good  Response to previous treatment: soreness B calf   Functional change: ambulating with stiff leg as noted when entering clinic.  Her brace is unlocked to 60 degrees, but she appears to be avoiding flexion.      Pain: 0/10  Location: left knee      Objective   Brace unlocked to 70 deg     Marielena received therapeutic exercises to develop strength, endurance, ROM, flexibility and core stabilization for 45 minutes including:    Squat 30x 0-60  SL L w/QS 3x15/3" 2lbs  Bridge on ball versus 30x   L SL hip abd  3x15 2lbs  Prone L hip ext  3x10  Prone TKE w/bolster 3x10  Heel slides 0-60 deg   staning hip extension/abduction 30x each with yellow band   Prone knee flexion stretch 0-60 w/ 20 sec holds 5x          Marielena received the following manual therapy techniques: Joint mobilizations, Myofacial release and Soft tissue Mobilization were applied to the: left knee for 10 minutes, including: patella medial/lateral mobs, STM quad/patella " tendon with passive knee flex to 60.  Scar mobilization also performed.     RICE for L knee for 10' for decreased pain, edema and circulation     Home Exercises Provided and Patient Education Provided     Education provided:   PWB with crutches     Written Home Exercises Provided: no .       Assessment     Pt tolerating tx well.  I did unlock her brace to 70 degrees and advised her to work on walking with more knee flexion.  We did practice this in the gym and she was able to perform quite well.  Continued strengthening in the new avail range.  Also, we did spend time with passive ROM to introduce new ranges slowly.  Pt received extensive mobilization of the scar and patella at multiple angles.  We will begin weaning out of brace and I will communicate this with MD.    Marielena is progressing well towards her goals.   Pt prognosis is Good.     Pt will continue to benefit from skilled outpatient physical therapy to address the deficits listed in the problem list box on initial evaluation, provide pt/family education and to maximize pt's level of independence in the home and community environment.     Pt's spiritual, cultural and educational needs considered and pt agreeable to plan of care and goals.    Anticipated barriers to physical therapy: none     Goals:    In 4-8 weeks, pt. will:  1. Pt will increase ROM to the left knee to 90 deg of flexion,  in order to perform ADLs and IADLs more effectively   2. Decrease overall pain to the left knee to 3-4/10,  on average with daily activities   3. Increase strength to the left LE to 3+/5 grossly throughout,  in order to perform ADLs and IADLs more effectively   4. Improve FOTO intake score to 45 to demonstrate improvement with functional mobility and use  5. Independent with HEP  Long Term GOALS:  In 8-16 weeks, pt. will:  1. Pt will increase ROM to the left knee to WNL,  in order to perform ADLs and IADLs more effectively   2. Decrease overall pain to left knee to  1-2/10,  on average with daily activities   3. Increase strength to the left LE to 4/5,  in order to perform ADLs and IADLs more effectively   4. Improve FOTO intake score to 70 to demonstrate improvement with functional mobility and use  5. Independent with HEP  6. Return to full recreational and community walking  unrestricted        Plan     Continue therapy as planned and progress accordingly.  Increase flexion motion over next 4 weeks and continue PT as planned.     Hamilton Mckeon, PT, STS

## 2019-12-04 NOTE — PROGRESS NOTES
HISTORY OF PRESENT ILLNESS:   Pt is here today for post-operative followup of knee surgery.  She is now 10 weeks from patellar tendon repair. She is wearing her T-scope brace with no issues. Pain well controlled. Has been active with PT. Brace is at 0-70 however she only flexes 0-60 in therapy. No pain.      DATE OF PROCEDURE: 9/24/2019      SURGEON: Danielle Cloud M.D.     OPERATION:   1.  Partial inferior pole patellectomy of the left patella with patellar tendon advancement/reconstruction CPT 16168 - 22 modifier                                                                          PHYSICAL EXAMINATION:     Incision sites healed well  No evidence of any erythema, infection or induration  Decreased mobility of patella  Range of motion 0-70 active.   Minimal effusion  2+ DP pulse  No swelling, no calf tenderness  - Hellen's sign  Negative medial joint line tendernes  Moderate quad atrophy    Xrays:  Xrays of the 2 view left knee were ordered and reviewed by me today. No fracture, subluxation, or other significant bony or joint abnormality is identified. Post-operative changes seen. Patella with normal position today.                                                                                  ASSESSMENT:                                                                                                                                               1. Status post above, doing well.  Lacks deep flexion.                                                                                                                            PLAN:                                                                                                                                                     1. Continue with PT and HEP.   2. Emphasized quad function, ok to do passive and active flexion to improve flexion  3. I have discussed return to activity in detail. Needs to work on flexion.   4.Patient will see us back at in 2 months  5. Kale  brace to help with flexion  5. All questions were answered and patient should contact us if she  has any questions or concerns in the interim.

## 2019-12-05 ENCOUNTER — CLINICAL SUPPORT (OUTPATIENT)
Dept: REHABILITATION | Facility: HOSPITAL | Age: 59
End: 2019-12-05
Payer: COMMERCIAL

## 2019-12-05 DIAGNOSIS — R53.1 WEAKNESS: ICD-10-CM

## 2019-12-05 DIAGNOSIS — M25.60 RANGE OF MOTION DEFICIT: ICD-10-CM

## 2019-12-05 PROCEDURE — 97140 MANUAL THERAPY 1/> REGIONS: CPT

## 2019-12-05 PROCEDURE — 97110 THERAPEUTIC EXERCISES: CPT

## 2019-12-10 ENCOUNTER — CLINICAL SUPPORT (OUTPATIENT)
Dept: REHABILITATION | Facility: HOSPITAL | Age: 59
End: 2019-12-10
Payer: COMMERCIAL

## 2019-12-10 DIAGNOSIS — M25.60 RANGE OF MOTION DEFICIT: ICD-10-CM

## 2019-12-10 DIAGNOSIS — R53.1 WEAKNESS: ICD-10-CM

## 2019-12-10 PROCEDURE — 97110 THERAPEUTIC EXERCISES: CPT

## 2019-12-10 PROCEDURE — 97140 MANUAL THERAPY 1/> REGIONS: CPT

## 2019-12-10 NOTE — PROGRESS NOTES
Physical Therapy Re assessment Note     Name: Marielena Yang [place]  Clinic Number: [id number]    Therapy Diagnosis:   Encounter Diagnoses   Name Primary?    Range of motion deficit     Weakness      Physician: Ran Thomas III, *    Visit Date: 12/10/2019   Physician: Ran Thmoas III, *  S82.032A (ICD-10-CM) - Closed displaced transverse fracture of left patella, initial encounter    S82.032A (ICD-10-CM) - Displaced transverse fracture of left patella, initial encounter for closed fracture  Physician Orders: 2    Medical Diagnosis: Eval and treat   Date of Surgery: 9/24/19  Evaluation Date: 10/8/2019  Authorization Period Expiration: 12/31/19  Plan of Care Certification Period: 1/28/20  Visit # / Visits authorized: 11/ 20    Precautions: Weightbearing  POSTOPERATIVE PLAN: Remain PWB for 6 weeks, locked out straight x 4 weeks    Time in 1450  Time out  1550  Tx time 45'  Totol tx time 60'      Subjective      Pt reports: no pain R knee,  Response to previous treatment: soreness  Functional change: ambulating with stiff leg without brace       Pain: 0/10  Location: left knee      Objective   PROM 73' flex     Marielena received therapeutic exercises to develop strength, endurance, ROM, flexibility and core stabilization for 40 minutes including:  Standing hip abd GTB 30x  Standing hip ext GTB 30x   Squat 30x 0-70  B shuttle press 1.5 straps 3x15  Prone L hip ext  3x10  Prone TKE w/bolster 3x10  L SL hip abd  3x15 2lbs      Not today  Bridge on ball versus 30x   L SL hip abd  3x15 2lbs  Heel slides 0-60 deg   staning hip extension/abduction 30x each with yellow band   Prone knee flexion stretch 0-60 w/ 20 sec holds 5x          Marielena received the following manual therapy techniques: Joint mobilizations, Myofacial release and Soft tissue Mobilization were applied to the: left knee for 10 minutes, including: patella medial/lateral mobs, STM, prone hip flexor and quad stretch        PARTH for L knee for 10' for decreased pain, edema and circulation     Home Exercises Provided and Patient Education Provided     Education provided:   PWB with crutches     Written Home Exercises Provided: no .       Assessment     Pt tolerating tx well. Ambulating into therapy w/o brace and tolerating w/o problems.  Continued strengthening in the new avail range. VC/TC for correcting form/technique with therex and for ROM per discussing with supervising PT. Continue to progress as tolerated    Marielena is progressing well towards her goals.   Pt prognosis is Good.     Pt will continue to benefit from skilled outpatient physical therapy to address the deficits listed in the problem list box on initial evaluation, provide pt/family education and to maximize pt's level of independence in the home and community environment.     Pt's spiritual, cultural and educational needs considered and pt agreeable to plan of care and goals.    Anticipated barriers to physical therapy: none     Goals:    In 4-8 weeks, pt. will:  1. Pt will increase ROM to the left knee to 90 deg of flexion,  in order to perform ADLs and IADLs more effectively   2. Decrease overall pain to the left knee to 3-4/10,  on average with daily activities   3. Increase strength to the left LE to 3+/5 grossly throughout,  in order to perform ADLs and IADLs more effectively   4. Improve FOTO intake score to 45 to demonstrate improvement with functional mobility and use  5. Independent with HEP  Long Term GOALS:  In 8-16 weeks, pt. will:  1. Pt will increase ROM to the left knee to WNL,  in order to perform ADLs and IADLs more effectively   2. Decrease overall pain to left knee to 1-2/10,  on average with daily activities   3. Increase strength to the left LE to 4/5,  in order to perform ADLs and IADLs more effectively   4. Improve FOTO intake score to 70 to demonstrate improvement with functional mobility and use  5. Independent with HEP  6. Return to  full recreational and community walking  unrestricted        Plan     Continue therapy as planned and progress accordingly.  Increase flexion motion over next 4 weeks and continue PT as planned.     Ming Campos, PTA, STS

## 2019-12-12 ENCOUNTER — CLINICAL SUPPORT (OUTPATIENT)
Dept: REHABILITATION | Facility: HOSPITAL | Age: 59
End: 2019-12-12
Payer: COMMERCIAL

## 2019-12-12 DIAGNOSIS — G47.00 INSOMNIA, UNSPECIFIED TYPE: ICD-10-CM

## 2019-12-12 DIAGNOSIS — R53.1 WEAKNESS: ICD-10-CM

## 2019-12-12 DIAGNOSIS — M25.60 RANGE OF MOTION DEFICIT: ICD-10-CM

## 2019-12-12 PROCEDURE — 97140 MANUAL THERAPY 1/> REGIONS: CPT

## 2019-12-12 PROCEDURE — 97110 THERAPEUTIC EXERCISES: CPT

## 2019-12-12 RX ORDER — HYDROXYZINE HYDROCHLORIDE 25 MG/1
TABLET, FILM COATED ORAL
Qty: 30 TABLET | Refills: 1 | Status: SHIPPED | OUTPATIENT
Start: 2019-12-12 | End: 2020-02-01 | Stop reason: SDUPTHER

## 2019-12-12 NOTE — PROGRESS NOTES
"                            Physical Therapy Re assessment Note     Name: Marielena Yang [place]  Clinic Number: [id number]    Therapy Diagnosis:   Encounter Diagnoses   Name Primary?    Range of motion deficit     Weakness      Physician: Ran Thomas III, *    Visit Date: 12/5/2019   Physician: Ran Thomas III, *  S82.032A (ICD-10-CM) - Closed displaced transverse fracture of left patella, initial encounter    S82.032A (ICD-10-CM) - Displaced transverse fracture of left patella, initial encounter for closed fracture  Physician Orders: 2    Medical Diagnosis: Eval and treat   Date of Surgery: 9/24/19  Evaluation Date: 10/8/2019  Authorization Period Expiration: 12/31/19  Plan of Care Certification Period: 1/28/20  Visit # / Visits authorized: 11/ 20    Precautions: Weightbearing  POSTOPERATIVE PLAN: Remain PWB for 6 weeks, locked out straight x 4 weeks    Time in 1445  Time out  1545  Tx time 45'  Totol tx time 60'      Subjective      Pt reports: min pain R knee, overall good, states she was doing her exercisee  Response to previous treatment: soreness B calf   Functional change: ambulating with stiff leg, brace opened to 70 but will wean off over next week.      Pain: 0/10  Location: left knee      Objective   Brace unlocked to 70 deg     Marielena received therapeutic exercises to develop strength, endurance, ROM, flexibility and core stabilization for 45 minutes including:    Squat 30x 0-70  Heel slides 45x   Prone knee flexion stretch 10x 20 sec hold   PROM at EOT 5x20 sec   Supine heel to chest on pball 30x     Not performed today:  SL L w/QS 3x15/3" 2lbs  Bridge on ball versus 30x   L SL hip abd  3x15 2lbs  Prone L hip ext  3x10  Prone TKE w/bolster 3x10  staning hip extension/abduction 30x each with yellow band   Prone knee flexion stretch 0-60 w/ 20 sec holds 5x          Marielena received the following manual therapy techniques: Joint mobilizations, Myofacial release and Soft tissue Mobilization were " applied to the: left knee for 10 minutes, including: patella medial/lateral mobs, STM quad/patella tendon with passive knee flex to 60.  Scar mobilization also performed.     RICE for L knee for 10' for decreased pain, edema and circulation     Home Exercises Provided and Patient Education Provided     Education provided:   PWB with crutches     Written Home Exercises Provided: no .       Assessment     Pt tolerating tx well.  We did unlock her brace today to 90, but we will begin to wean off brace.  She tolerates 70 deg of flexion, and we continued to work on mobility.   We will continue this aggressive approach to ROM with each treatment session.   Marielena is progressing well towards her goals.   Pt prognosis is Good.     Pt will continue to benefit from skilled outpatient physical therapy to address the deficits listed in the problem list box on initial evaluation, provide pt/family education and to maximize pt's level of independence in the home and community environment.     Pt's spiritual, cultural and educational needs considered and pt agreeable to plan of care and goals.    Anticipated barriers to physical therapy: none     Goals:    In 4-8 weeks, pt. will:  1. Pt will increase ROM to the left knee to 90 deg of flexion,  in order to perform ADLs and IADLs more effectively   2. Decrease overall pain to the left knee to 3-4/10,  on average with daily activities   3. Increase strength to the left LE to 3+/5 grossly throughout,  in order to perform ADLs and IADLs more effectively   4. Improve FOTO intake score to 45 to demonstrate improvement with functional mobility and use  5. Independent with HEP  Long Term GOALS:  In 8-16 weeks, pt. will:  1. Pt will increase ROM to the left knee to WNL,  in order to perform ADLs and IADLs more effectively   2. Decrease overall pain to left knee to 1-2/10,  on average with daily activities   3. Increase strength to the left LE to 4/5,  in order to perform ADLs and IADLs  more effectively   4. Improve FOTO intake score to 70 to demonstrate improvement with functional mobility and use  5. Independent with HEP  6. Return to full recreational and community walking  unrestricted        Plan     Continue therapy as planned and progress accordingly.  Increase flexion motion over next 4 weeks and continue PT as planned.     Hamilton Mckeon, PT, STS

## 2019-12-12 NOTE — PROGRESS NOTES
"Physical Therapy Daily Treatment Note     Name: Marielena Yang [place]  Clinic Number: [id number]    Therapy Diagnosis:   Encounter Diagnoses   Name Primary?    Range of motion deficit     Weakness      Physician: Ran Thomas III, *    Visit Date: 12/13/2019  Physician Orders: Eval & Treat  Medical Diagnosis : S82.032A (ICD-10-CM) - Closed displaced transverse fracture of left patella, initial encounter    S82.032A (ICD-10-CM) - Displaced transverse fracture of left patella, initial encounter for closed fracture  Date of Surgery: 9/24/19  Evaluation Date: 10/8/2019  Authorization Period Expiration: 12/31/19  Plan of Care Certification Period: 1/28/20  Visit # / Visits authorized: 12/ 20  Total visits: 11 land, 1 aquatic    Time In: 0800  Time Out: 0900  Total Billable Time: 30 minutes    Precautions: WBAT LLE, no L knee brace(discontinued)    PROCEDURES/IMAGING:  Subjective     Pt reports: "They worked me out hard the other day"  She was compliant with home exercise program.  Response to previous treatment: 1st pool treatment  Functional change: walks into clinic with significantly decreased L knee flexion during swing phase of gait    Pain: 0/10  Location: left knee      Objective   ###Patient is seen 2x/wk for land therapy    Marielena received aquatic therapeutic exercises to develop strength, endurance, ROM, flexibility, posture and core stabilization for 60 minutes including:    WARMUP x 2 laps each  Walk fwd/back/side    STRETCHES 3 x 20sec  Hamstring  Knee flexion    LE EX x 20  Mini squat  Heel raise with GS  Hip abd/add  Hip flex/ext  LAQ with hip flex  HS curls  Lunges forward    UE EX/CORE   None    ENDURANCE  Bicycle // bars x 6 min    COOL DOWN x 1 lap each  Walk fwd/back/side      Home Exercises Provided and Patient Education Provided     Education provided:   Role of aquatic therapy  Hydration post therapy  Ice to L knee after therapy    Written Home Exercises Provided: Patient instructed to cont " prior HEP.  Exercises were reviewed and Marielena was able to demonstrate them prior to the end of the session.  Marielena demonstrated good  understanding of the education provided.     See EMR under Patient Instructions for exercises provided prior visit.    Assessment   FOTO limitation score is 44%(was 73%) indicative of decreased pain & improved function  Patient required frequent verbal cues  for proper technique & core stabilization. Patient did well post instruction Patient was sent to gym for ice to L knee.. Patient will benefit from aquatic environment to unload  Bilateral LEs for strengthening, ROM, core stabilization & postural awareness.      Marielena is progressing well towards her goals.   Pt prognosis is Fair.     Pt will continue to benefit from skilled aquatic outpatient physical therapy to address the deficits listed in the problem list box on initial evaluation, provide pt/family education and to maximize pt's level of independence in the home and community environment.     Pt's spiritual, cultural and educational needs considered and pt agreeable to plan of care and goals.    Anticipated barriers to physical therapy: none    Goals:   In 4-8 weeks, pt. will:  1. Pt will increase ROM to the left knee to 90 deg of flexion,  in order to perform ADLs and IADLs more effectively (progressing)  2. Decrease overall pain to the left knee to 3-4/10,  on average with daily activities (progressing)  3. Increase strength to the left LE to 3+/5 grossly throughout,  in order to perform ADLs and IADLs more effectively (progressing)  4. Improve FOTO intake score to 45 to demonstrate improvement with functional mobility and use (progressing)  5. Independent with HEP (progressing)  Long Term GOALS:  In 8-16 weeks, pt. will:  1. Pt will increase ROM to the left knee to WNL,  in order to perform ADLs and IADLs more effectively (progressing)  2. Decrease overall pain to left knee to 1-2/10,  on average with daily activities   (progressing)  3. Increase strength to the left LE to 4/5,  in order to perform ADLs and IADLs more effectively (progressing)  4. Improve FOTO intake score to 70 to demonstrate improvement with functional mobility and use (progressing)  5. Independent with HEP (progressing)  6. Return to full recreational and community walking  unrestricted  (progressing)    Plan     Increase exercises & reps as tolerated    Ginger Seals, PT

## 2019-12-13 ENCOUNTER — CLINICAL SUPPORT (OUTPATIENT)
Dept: REHABILITATION | Facility: HOSPITAL | Age: 59
End: 2019-12-13
Payer: COMMERCIAL

## 2019-12-13 DIAGNOSIS — M25.60 RANGE OF MOTION DEFICIT: ICD-10-CM

## 2019-12-13 DIAGNOSIS — R53.1 WEAKNESS: ICD-10-CM

## 2019-12-13 PROCEDURE — 97113 AQUATIC THERAPY/EXERCISES: CPT

## 2019-12-17 ENCOUNTER — CLINICAL SUPPORT (OUTPATIENT)
Dept: REHABILITATION | Facility: HOSPITAL | Age: 59
End: 2019-12-17
Payer: COMMERCIAL

## 2019-12-17 DIAGNOSIS — R53.1 WEAKNESS: ICD-10-CM

## 2019-12-17 DIAGNOSIS — M25.60 RANGE OF MOTION DEFICIT: ICD-10-CM

## 2019-12-17 PROCEDURE — 97140 MANUAL THERAPY 1/> REGIONS: CPT

## 2019-12-17 PROCEDURE — 97110 THERAPEUTIC EXERCISES: CPT

## 2019-12-17 NOTE — PROGRESS NOTES
Physical Therapy Re assessment Note     Name: Marielena Yang [place]  Clinic Number: [id number]    Therapy Diagnosis:   Encounter Diagnoses   Name Primary?    Range of motion deficit     Weakness      Physician: Ran Thomas III, *    Visit Date: 12/17/2019   Physician: Ran Thomas III, *  S82.032A (ICD-10-CM) - Closed displaced transverse fracture of left patella, initial encounter    S82.032A (ICD-10-CM) - Displaced transverse fracture of left patella, initial encounter for closed fracture  Physician Orders: 2    Medical Diagnosis: Eval and treat   Date of Surgery: 9/24/19  Evaluation Date: 10/8/2019  Authorization Period Expiration: 12/31/19  Plan of Care Certification Period: 1/28/20  Visit # / Visits authorized: 11/ 20    Precautions: Weightbearing  POSTOPERATIVE PLAN: Remain PWB for 6 weeks, locked out straight x 4 weeks    Time in 1450  Time out  1550  Tx time 30'  Totol tx time 60'      Subjective      Pt reports: no pain R knee,  Response to previous treatment: soreness  Functional change: ambulating with stiff leg without brace       Pain: 0/10  Location: left knee      Objective   AAROM L knee flexion 80'    Marielena received therapeutic exercises to develop strength, endurance, ROM, flexibility and core stabilization for 40 minutes including:  Stationary bike 10' rocking for increased ROM  Squats to chair 3x10' 0-80/90'  B shuttle press 2 straps 3x15  Heel slides 0-70 deg   Bridges 3x10    Not today  L SL hip abd  3x15 2lbs  staning hip extension/abduction 30x each with yellow band   Prone knee flexion stretch 0-60 w/ 20 sec holds 5x   Heel slides 0-60 deg        Marielena received the following manual therapy techniques: Joint mobilizations, Myofacial release and Soft tissue Mobilization were applied to the: left knee for 10 minutes, including: patella medial/lateral mobs, STM, prone hip flexor and quad stretch       RICE for L knee for 10' for decreased pain, edema and  circulation     Home Exercises Provided and Patient Education Provided     Education provided:   PWB with crutches     Written Home Exercises Provided: no .       Assessment     Pt tolerating tx well. Ambulating into therapy w/o brace and tolerating w/o problems.  Continued strengthening in the new avail range. VC/TC for correcting form/technique with therex and for ROM per discussing with supervising PT. Continue to progress as tolerated    Marielena is progressing well towards her goals.   Pt prognosis is Good.     Pt will continue to benefit from skilled outpatient physical therapy to address the deficits listed in the problem list box on initial evaluation, provide pt/family education and to maximize pt's level of independence in the home and community environment.     Pt's spiritual, cultural and educational needs considered and pt agreeable to plan of care and goals.    Anticipated barriers to physical therapy: none     Goals:    In 4-8 weeks, pt. will:  1. Pt will increase ROM to the left knee to 90 deg of flexion,  in order to perform ADLs and IADLs more effectively   2. Decrease overall pain to the left knee to 3-4/10,  on average with daily activities   3. Increase strength to the left LE to 3+/5 grossly throughout,  in order to perform ADLs and IADLs more effectively   4. Improve FOTO intake score to 45 to demonstrate improvement with functional mobility and use  5. Independent with HEP  Long Term GOALS:  In 8-16 weeks, pt. will:  1. Pt will increase ROM to the left knee to WNL,  in order to perform ADLs and IADLs more effectively   2. Decrease overall pain to left knee to 1-2/10,  on average with daily activities   3. Increase strength to the left LE to 4/5,  in order to perform ADLs and IADLs more effectively   4. Improve FOTO intake score to 70 to demonstrate improvement with functional mobility and use  5. Independent with HEP  6. Return to full recreational and community walking  unrestricted         Plan     Continue therapy as planned and progress accordingly.  Increase flexion motion over next 4 weeks and continue PT as planned.     Ming Campos, PTA, STS

## 2019-12-19 ENCOUNTER — CLINICAL SUPPORT (OUTPATIENT)
Dept: REHABILITATION | Facility: HOSPITAL | Age: 59
End: 2019-12-19
Payer: COMMERCIAL

## 2019-12-19 DIAGNOSIS — R53.1 WEAKNESS: ICD-10-CM

## 2019-12-19 DIAGNOSIS — M25.60 RANGE OF MOTION DEFICIT: ICD-10-CM

## 2019-12-19 PROCEDURE — 97113 AQUATIC THERAPY/EXERCISES: CPT

## 2019-12-19 NOTE — PROGRESS NOTES
Physical Therapy Daily Treatment Note     Name: Marielena Yang [place]  Clinic Number: [id number]    Therapy Diagnosis:   Encounter Diagnoses   Name Primary?    Range of motion deficit     Weakness      Physician: Ran Thomas III, *    Visit Date: 12/19/2019  Physician Orders: Eval & Treat  Medical Diagnosis : S82.032A (ICD-10-CM) - Closed displaced transverse fracture of left patella, initial encounter    S82.032A (ICD-10-CM) - Displaced transverse fracture of left patella, initial encounter for closed fracture  Date of Surgery: 9/24/19  Evaluation Date: 10/8/2019  Authorization Period Expiration: 12/31/19  Plan of Care Certification Period: 1/28/20  Visit # / Visits authorized: 14/ 20  Total visits: 12 land, 2 aquatic    Time In: 0950 am   Time Out: 1050 am    Total Billable Time: 40 minutes    Precautions: WBAT LLE, no L knee brace(discontinued)    PROCEDURES/IMAGING:  Subjective     Pt reports: I had land therapy around 5 one day and then the next day I had aquatic therapy around 8 so I was maybe a little bit more sore and tight but no real increase in pain. I like doing the water therapy. I think it is helping some.    She was compliant with home exercise program.  Response to previous treatment: reports having some soreness and tightness after last pool session  Functional change: walks into clinic with significantly decreased L knee flexion during swing phase of gait    Pain: 0/10  Location: left knee      Objective   ###Patient is seen 2x/wk for land therapy    Marielena received aquatic therapeutic exercises to develop strength, endurance, ROM, flexibility, posture and core stabilization for 60 minutes including:    WARMUP x 2 laps each  Walk fwd/back/side    STRETCHES 3 x 20sec  Hamstring  Knee flexion    LE EX x 20  Mini squat  Heel raise with GS  Hip abd/add  Hip flex/ext  LAQ with hip flex  HS curls  Lunges forward  Step ups x 15 each LE   Mini squat with chest press with blue disc   Leg press with  green noodle x 20 each LE     UE EX/CORE   None    ENDURANCE  Bicycle // bars x 6 min    COOL DOWN x 1 lap each  Walk fwd/back/side    Home Exercises Provided and Patient Education Provided     Education provided:   Role of aquatic therapy  Hydration post therapy  Ice to L knee after therapy    Written Home Exercises Provided: Patient instructed to cont prior HEP.  Exercises were reviewed and Marielena was able to demonstrate them prior to the end of the session.  Marielena demonstrated good  understanding of the education provided.     See EMR under Patient Instructions for exercises provided prior visit.    Assessment     Patient able to tolerate progression of exercises including leg press and step ups while at step without any complaints of increase in pain with left UE support at handrail for all step up activities and to ensure proper foot placement with step negotiation. Patient continues to demonstrate limited left knee flexion range of motion especially with hamstring curls and left knee flexion stretch at step.. Patient will benefit from aquatic environment to unload  Bilateral LEs for strengthening, ROM, core stabilization & postural awareness.    Marielena is progressing well towards her goals.   Pt prognosis is Fair.     Pt will continue to benefit from skilled aquatic outpatient physical therapy to address the deficits listed in the problem list box on initial evaluation, provide pt/family education and to maximize pt's level of independence in the home and community environment.     Pt's spiritual, cultural and educational needs considered and pt agreeable to plan of care and goals.    Anticipated barriers to physical therapy: none    Goals:   In 4-8 weeks, pt. will:  1. Pt will increase ROM to the left knee to 90 deg of flexion,  in order to perform ADLs and IADLs more effectively (progressing)  2. Decrease overall pain to the left knee to 3-4/10,  on average with daily activities  (progressing)  3. Increase strength to the left LE to 3+/5 grossly throughout,  in order to perform ADLs and IADLs more effectively (progressing)  4. Improve FOTO intake score to 45 to demonstrate improvement with functional mobility and use (progressing)  5. Independent with HEP (progressing)  Long Term GOALS:  In 8-16 weeks, pt. will:  1. Pt will increase ROM to the left knee to WNL,  in order to perform ADLs and IADLs more effectively (progressing)  2. Decrease overall pain to left knee to 1-2/10,  on average with daily activities  (progressing)  3. Increase strength to the left LE to 4/5,  in order to perform ADLs and IADLs more effectively (progressing)  4. Improve FOTO intake score to 70 to demonstrate improvement with functional mobility and use (progressing)  5. Independent with HEP (progressing)  6. Return to full recreational and community walking  unrestricted  (progressing)    Plan     Increase exercises & reps as tolerated, progress with left knee flexion ROM as able     Deedee Ramirez, PT

## 2019-12-23 ENCOUNTER — PATIENT MESSAGE (OUTPATIENT)
Dept: ADMINISTRATIVE | Facility: OTHER | Age: 59
End: 2019-12-23

## 2019-12-23 ENCOUNTER — CLINICAL SUPPORT (OUTPATIENT)
Dept: REHABILITATION | Facility: HOSPITAL | Age: 59
End: 2019-12-23
Payer: COMMERCIAL

## 2019-12-23 DIAGNOSIS — R53.1 WEAKNESS: ICD-10-CM

## 2019-12-23 DIAGNOSIS — M25.60 RANGE OF MOTION DEFICIT: ICD-10-CM

## 2019-12-23 PROCEDURE — 97140 MANUAL THERAPY 1/> REGIONS: CPT

## 2019-12-23 PROCEDURE — 97110 THERAPEUTIC EXERCISES: CPT

## 2019-12-23 NOTE — PROGRESS NOTES
"                            Physical Therapy Re assessment Note     Name: Marielena Yang [place]  Clinic Number: [id number]    Therapy Diagnosis:   Encounter Diagnoses   Name Primary?    Range of motion deficit     Weakness      Physician: Ran Thomas III, *    Visit Date: 12/23/2019   Physician: Ran Thomas III, *  S82.032A (ICD-10-CM) - Closed displaced transverse fracture of left patella, initial encounter    S82.032A (ICD-10-CM) - Displaced transverse fracture of left patella, initial encounter for closed fracture  Physician Orders: 2    Medical Diagnosis: Eval and treat   Date of Surgery: 9/24/19  Evaluation Date: 10/8/2019  Authorization Period Expiration: 12/31/19  Plan of Care Certification Period: 1/28/20  Visit # / Visits authorized: 11/ 20    Precautions: Weightbearing  POSTOPERATIVE PLAN: Remain PWB for 6 weeks, locked out straight x 4 weeks    Time in 1455  Time out  1555  Tx time 45'  Totol tx time 60'      Subjective      Pt reports: no pain R knee when arrived for tx  Response to previous treatment: soreness  Functional change: lacking knee flexion in gait     Pain: 0/10  Location: left knee      Objective   AAROM L knee flexion 90'    Marielena received therapeutic exercises to develop strength, endurance, ROM, flexibility and core stabilization for 50 minutes including:  Stationary bike 10' rocking for increased ROM  B shuttle press 2 straps 3x15  L SL press 1 strap 3x10  Squats to chair 3x10' 0-80/90'  Heel slides w/strap 3x10/3"  Bridges 3x10    Not today  L SL hip abd  3x15 2lbs  staning hip extension/abduction 30x each with yellow band   Prone knee flexion stretch 0-60 w/ 20 sec holds 5x   Heel slides 0-60 deg        Marielena received the following manual therapy techniques: Joint mobilizations, Myofacial release and Soft tissue Mobilization were applied to the: left knee for 10 minutes, including: patella medial/lateral mobs, STM, prone hip flexor and quad stretch       RICE for L knee " for 0' for decreased pain, edema and circulation     Home Exercises Provided and Patient Education Provided     Education provided:   PWB with crutches     Written Home Exercises Provided: no .       Assessment     Pt tolerating tx well. .  Continued strengthening in the new avail range to 90' flexion AAROM.  VC/TC for correcting form/technique with therex and for ROM per discussing with supervising PT. Continue to progress as tolerated    Marielena is progressing well towards her goals.   Pt prognosis is Good.     Pt will continue to benefit from skilled outpatient physical therapy to address the deficits listed in the problem list box on initial evaluation, provide pt/family education and to maximize pt's level of independence in the home and community environment.     Pt's spiritual, cultural and educational needs considered and pt agreeable to plan of care and goals.    Anticipated barriers to physical therapy: none     Goals:    In 4-8 weeks, pt. will:  1. Pt will increase ROM to the left knee to 90 deg of flexion,  in order to perform ADLs and IADLs more effectively   2. Decrease overall pain to the left knee to 3-4/10,  on average with daily activities   3. Increase strength to the left LE to 3+/5 grossly throughout,  in order to perform ADLs and IADLs more effectively   4. Improve FOTO intake score to 45 to demonstrate improvement with functional mobility and use  5. Independent with HEP  Long Term GOALS:  In 8-16 weeks, pt. will:  1. Pt will increase ROM to the left knee to WNL,  in order to perform ADLs and IADLs more effectively   2. Decrease overall pain to left knee to 1-2/10,  on average with daily activities   3. Increase strength to the left LE to 4/5,  in order to perform ADLs and IADLs more effectively   4. Improve FOTO intake score to 70 to demonstrate improvement with functional mobility and use  5. Independent with HEP  6. Return to full recreational and community walking  unrestricted        Plan      Continue therapy as planned and progress accordingly.  Increase flexion motion over next 4 weeks and continue PT as planned.     Ming Campos, PTA, STS

## 2019-12-26 NOTE — PROGRESS NOTES
Physical Therapy Daily Treatment Note     Name: Marielena Yang [place]  Clinic Number: [id number]    Therapy Diagnosis:   Encounter Diagnoses   Name Primary?    Range of motion deficit     Weakness      Physician: Ran Thomas III, *    Visit Date: 12/27/2019  Physician Orders: Eval & Treat  Medical Diagnosis : S82.032A (ICD-10-CM) - Closed displaced transverse fracture of left patella, initial encounter    S82.032A (ICD-10-CM) - Displaced transverse fracture of left patella, initial encounter for closed fracture  Date of Surgery: 9/24/19  Evaluation Date: 10/8/2019  Authorization Period Expiration: 12/31/19  Plan of Care Certification Period: 1/28/20  Visit # / Visits authorized: 16/ 20  Total visits: 13 land, 3 aquatic    Time In: 1000  Time Out: 1100   Total Billable Time: 30 minutes    Precautions: WBAT LLE, no L knee brace(discontinued)    PROCEDURES/IMAGING:  Subjective     Pt reports: I felt great for 3 days after last session; I had land therapy on Monday & I was hurting for 2 days   She was compliant with home exercise program.  Response to previous treatment: no soreness  Functional change: walks into clinic with significantly decreased L knee flexion during swing phase of gait    Pain: 2/10  Location: left knee      Objective   ###Patient is seen 2x/wk for land therapy    Marielena received aquatic therapeutic exercises to develop strength, endurance, ROM, flexibility, posture and core stabilization for 60 minutes including:    WARMUP x 2 laps each  Walk fwd/back/side    STRETCHES 3 x 20sec  Hamstring  Knee flexion    LE EX x 20  Mini squat  Heel raise with GS  Hip abd/add  Hip flex/ext  LAQ with hip flex  HS curls  Lunges forward  Step up forward x 15 each LE   Step up lateral x 10 each LE  Mini squat with chest press with blue disc   Leg press with noodle/tubing x 20 each LE  Hip extension noodle/tubing x 20 each LE  Step down from Box x 10 each LE    UE EX/CORE   None    ENDURANCE  Bicycle // bars x 6  min    COOL DOWN x 1 lap each  Walk fwd/back/side    AAROM L knee flexion  12/27/2019: 80 degrees    Home Exercises Provided and Patient Education Provided     Education provided:   Hydration post therapy  Ice to L knee after therapy    Written Home Exercises Provided: Patient instructed to cont prior HEP.  Exercises were reviewed and Marielena was able to demonstrate them prior to the end of the session.  Marielena demonstrated good  understanding of the education provided.     See EMR under Patient Instructions for exercises provided prior visit.    Assessment     Patient able to tolerate progression of exercises including hip extension with noodle & step down from box. Increased truncal strategies of leaning R with stepping down with RLE 2/2 increased L knee flexion required to perform exercise.. Patient continues to demonstrate limited left knee flexion range of motion with stretching, exercises & gait in/out of water. Patient will benefit from aquatic environment to unload  Bilateral LEs for strengthening, ROM, core stabilization & postural awareness.    Marielena is progressing well towards her goals.   Pt prognosis is Fair.     Pt will continue to benefit from skilled aquatic outpatient physical therapy to address the deficits listed in the problem list box on initial evaluation, provide pt/family education and to maximize pt's level of independence in the home and community environment.     Pt's spiritual, cultural and educational needs considered and pt agreeable to plan of care and goals.    Anticipated barriers to physical therapy: none    Goals:   In 4-8 weeks, pt. will:  1. Pt will increase ROM to the left knee to 90 deg of flexion,  in order to perform ADLs and IADLs more effectively (progressing)  2. Decrease overall pain to the left knee to 3-4/10,  on average with daily activities (progressing)  3. Increase strength to the left LE to 3+/5 grossly throughout,  in order to perform ADLs and IADLs more  effectively (progressing)  4. Improve FOTO intake score to 45 to demonstrate improvement with functional mobility and use (progressing)  5. Independent with HEP (progressing)  Long Term GOALS:  In 8-16 weeks, pt. will:  1. Pt will increase ROM to the left knee to WNL,  in order to perform ADLs and IADLs more effectively (progressing)  2. Decrease overall pain to left knee to 1-2/10,  on average with daily activities  (progressing)  3. Increase strength to the left LE to 4/5,  in order to perform ADLs and IADLs more effectively (progressing)  4. Improve FOTO intake score to 70 to demonstrate improvement with functional mobility and use (progressing)  5. Independent with HEP (progressing)  6. Return to full recreational and community walking  unrestricted  (progressing)    Plan     Increase exercises & reps as tolerated, progress with left knee flexion ROM as able     Ginger Seals, PT

## 2019-12-27 ENCOUNTER — CLINICAL SUPPORT (OUTPATIENT)
Dept: REHABILITATION | Facility: HOSPITAL | Age: 59
End: 2019-12-27
Payer: COMMERCIAL

## 2019-12-27 DIAGNOSIS — R53.1 WEAKNESS: ICD-10-CM

## 2019-12-27 DIAGNOSIS — M25.60 RANGE OF MOTION DEFICIT: ICD-10-CM

## 2019-12-27 PROCEDURE — 97113 AQUATIC THERAPY/EXERCISES: CPT

## 2019-12-27 NOTE — PROGRESS NOTES
Physical Therapy Re assessment Note     Name: Marielena Yang [place]  Clinic Number: [id number]    Therapy Diagnosis:   Encounter Diagnoses   Name Primary?    Range of motion deficit     Weakness      Physician: Ran Thomas III, *    Visit Date: 12/12/2019   Physician: Ran Thomas III, *  S82.032A (ICD-10-CM) - Closed displaced transverse fracture of left patella, initial encounter    S82.032A (ICD-10-CM) - Displaced transverse fracture of left patella, initial encounter for closed fracture  Physician Orders: 2    Medical Diagnosis: Eval and treat   Date of Surgery: 9/24/19  Evaluation Date: 10/8/2019  Authorization Period Expiration: 12/31/19  Plan of Care Certification Period: 1/28/20  Visit # / Visits authorized: 11/ 20    Precautions: Weightbearing  POSTOPERATIVE PLAN: Remain PWB for 6 weeks, locked out straight x 4 weeks    Time in 500pm   Time out  600pm   Tx time 45'  Totol tx time 60'      Subjective      Pt reports: pain in the knee with bending, but no pain at rest   Response to previous treatment: soreness  Functional change: ambulating with stiff leg without brace       Pain: 0/10  Location: left knee      Objective   PROM 75' flex     Marielena received therapeutic exercises to develop strength, endurance, ROM, flexibility and core stabilization for 40 minutes including:  Standing hip abd GTB 30x  Standing hip ext GTB 30x   Squat 30x 0-70  B shuttle press 1.5 straps 3x15  Prone ROM to 75 deg 3 min   Heel slides 30x   Box lunge for self mobility 30x       Not today  Bridge on ball versus 30x   L SL hip abd  3x15 2lbs  Heel slides 0-60 deg   staning hip extension/abduction 30x each with yellow band   Prone knee flexion stretch 0-60 w/ 20 sec holds 5x          Marielena received the following manual therapy techniques: Joint mobilizations, Myofacial release and Soft tissue Mobilization were applied to the: left knee for 10 minutes, including: patella medial/lateral mobs,  STM, prone hip flexor and quad stretch       RICE for L knee for 10' for decreased pain, edema and circulation     Home Exercises Provided and Patient Education Provided     Education provided:   PWB with crutches     Written Home Exercises Provided: no .       Assessment     Pt tolerating tx well. Pt continues to ambulate with stiff leg motion and at times circumduction. We did train on the turf over low hurdles for knee flexion today during gait.  It's clear that their remains a lack of confidence in the pt especially during the gait cycle.  I have advised pt again on heel slide and ROM exercises at home and for proper gait mechanics.  Look to possibly put her in the water for aquatics as well.  I did speak with the PTA regarding updated POC.    Marielena is progressing well towards her goals.   Pt prognosis is Good.     Pt will continue to benefit from skilled outpatient physical therapy to address the deficits listed in the problem list box on initial evaluation, provide pt/family education and to maximize pt's level of independence in the home and community environment.     Pt's spiritual, cultural and educational needs considered and pt agreeable to plan of care and goals.    Anticipated barriers to physical therapy: none     Goals:    In 4-8 weeks, pt. will:  1. Pt will increase ROM to the left knee to 90 deg of flexion,  in order to perform ADLs and IADLs more effectively   2. Decrease overall pain to the left knee to 3-4/10,  on average with daily activities   3. Increase strength to the left LE to 3+/5 grossly throughout,  in order to perform ADLs and IADLs more effectively   4. Improve FOTO intake score to 45 to demonstrate improvement with functional mobility and use  5. Independent with HEP  Long Term GOALS:  In 8-16 weeks, pt. will:  1. Pt will increase ROM to the left knee to WNL,  in order to perform ADLs and IADLs more effectively   2. Decrease overall pain to left knee to 1-2/10,  on average with  daily activities   3. Increase strength to the left LE to 4/5,  in order to perform ADLs and IADLs more effectively   4. Improve FOTO intake score to 70 to demonstrate improvement with functional mobility and use  5. Independent with HEP  6. Return to full recreational and community walking  unrestricted        Plan     Continue therapy as planned and progress accordingly.  Increase flexion motion over next 4 weeks and continue PT as planned.     Hamilton Mckeon, PT, STS

## 2019-12-31 ENCOUNTER — CLINICAL SUPPORT (OUTPATIENT)
Dept: REHABILITATION | Facility: HOSPITAL | Age: 59
End: 2019-12-31
Payer: COMMERCIAL

## 2019-12-31 DIAGNOSIS — R53.1 WEAKNESS: ICD-10-CM

## 2019-12-31 DIAGNOSIS — M25.60 RANGE OF MOTION DEFICIT: ICD-10-CM

## 2019-12-31 PROCEDURE — 97140 MANUAL THERAPY 1/> REGIONS: CPT

## 2019-12-31 PROCEDURE — 97110 THERAPEUTIC EXERCISES: CPT

## 2019-12-31 NOTE — PROGRESS NOTES
"                            Physical Therapy Re assessment Note     Name: Marielena Yang [place]  Clinic Number: [id number]    Therapy Diagnosis:   Encounter Diagnoses   Name Primary?    Range of motion deficit     Weakness      Physician: Ran Thomas III, *    Visit Date: 12/31/2019   Physician: Ran Thomas III, *  S82.032A (ICD-10-CM) - Closed displaced transverse fracture of left patella, initial encounter    S82.032A (ICD-10-CM) - Displaced transverse fracture of left patella, initial encounter for closed fracture  Physician Orders: 2    Medical Diagnosis: Eval and treat   Date of Surgery: 9/24/19  Evaluation Date: 10/8/2019  Authorization Period Expiration: 12/31/19  Plan of Care Certification Period: 1/28/20  Visit # / Visits authorized: 12/ 20    Precautions: Weightbearing  POSTOPERATIVE PLAN: Remain PWB for 6 weeks, locked out straight x 4 weeks    Time in 0950  Time out  1050  Tx time 45'  Totol tx time 60'      Subjective      Pt reports: medial tightness  R knee   Response to previous treatment: soreness  Functional change: lacking knee flexion in gait     Pain: 0/10  Location: left knee      Objective   AAROM L knee flexion 95'    Marielena received therapeutic exercises to develop strength, endurance, ROM, flexibility and core stabilization for 50 minutes including:  Stationary bike 10' rocking for increased ROM seat lv 6  Squats to chair 3x10' 0-80/90'  B shuttle press 2 straps 3x15  L SL press 1 strap 3x10  Heel slides w/strap 3x10/3"  Bridges 3x10    Not today  L SL hip abd  3x15 2lbs  staning hip extension/abduction 30x each with yellow band   Prone knee flexion stretch 0-60 w/ 20 sec holds 5x   Heel slides 0-60 deg        Marielena received the following manual therapy techniques: Joint mobilizations, Myofacial release and Soft tissue Mobilization were applied to the: left knee for 10 minutes, including: patella medial/lateral mobs, STM, prone hip flexor and quad stretch       RICE for L knee " for 0' for decreased pain, edema and circulation     Home Exercises Provided and Patient Education Provided     Education provided:   PWB with crutches     Written Home Exercises Provided: no .       Assessment     Pt tolerating tx well. .  Continued strengthening in the new avail range to 95' flexion AAROM.  VC/TC for correcting form/technique with therex and for ROM per discussing with supervising PT. Continue to progress as tolerated    Marielena is progressing well towards her goals.   Pt prognosis is Good.     Pt will continue to benefit from skilled outpatient physical therapy to address the deficits listed in the problem list box on initial evaluation, provide pt/family education and to maximize pt's level of independence in the home and community environment.     Pt's spiritual, cultural and educational needs considered and pt agreeable to plan of care and goals.    Anticipated barriers to physical therapy: none     Goals:    In 4-8 weeks, pt. will:  1. Pt will increase ROM to the left knee to 90 deg of flexion,  in order to perform ADLs and IADLs more effectively   2. Decrease overall pain to the left knee to 3-4/10,  on average with daily activities   3. Increase strength to the left LE to 3+/5 grossly throughout,  in order to perform ADLs and IADLs more effectively   4. Improve FOTO intake score to 45 to demonstrate improvement with functional mobility and use  5. Independent with HEP  Long Term GOALS:  In 8-16 weeks, pt. will:  1. Pt will increase ROM to the left knee to WNL,  in order to perform ADLs and IADLs more effectively   2. Decrease overall pain to left knee to 1-2/10,  on average with daily activities   3. Increase strength to the left LE to 4/5,  in order to perform ADLs and IADLs more effectively   4. Improve FOTO intake score to 70 to demonstrate improvement with functional mobility and use  5. Independent with HEP  6. Return to full recreational and community walking  unrestricted        Plan      Continue therapy as planned and progress accordingly.  Increase flexion motion over next 4 weeks and continue PT as planned.     Ming Campos, PTA, STS

## 2020-01-07 ENCOUNTER — CLINICAL SUPPORT (OUTPATIENT)
Dept: REHABILITATION | Facility: HOSPITAL | Age: 60
End: 2020-01-07
Payer: COMMERCIAL

## 2020-01-07 DIAGNOSIS — R53.1 WEAKNESS: ICD-10-CM

## 2020-01-07 DIAGNOSIS — M25.60 RANGE OF MOTION DEFICIT: ICD-10-CM

## 2020-01-07 PROCEDURE — 97110 THERAPEUTIC EXERCISES: CPT

## 2020-01-07 PROCEDURE — 97140 MANUAL THERAPY 1/> REGIONS: CPT

## 2020-01-07 NOTE — PROGRESS NOTES
"                            Physical Therapy Re assessment Note     Name: Marielena Yang [place]  Clinic Number: [id number]    Therapy Diagnosis:   Encounter Diagnoses   Name Primary?    Range of motion deficit     Weakness      Physician: Ran Thomas III, *    Visit Date: 1/7/2020   Physician: Ran Thomas III, *  S82.032A (ICD-10-CM) - Closed displaced transverse fracture of left patella, initial encounter    S82.032A (ICD-10-CM) - Displaced transverse fracture of left patella, initial encounter for closed fracture  Physician Orders: 2    Medical Diagnosis: Eval and treat   Date of Surgery: 9/24/19  Evaluation Date: 10/8/2019  Authorization Period Expiration: 12/31/19  Plan of Care Certification Period: 1/28/20  Visit # / Visits authorized: 13/ 20    Precautions: Weightbearing  POSTOPERATIVE PLAN: Remain PWB for 6 weeks, locked out straight x 4 weeks    Time in 0750  Time out  0850  Tx time 45'  Totol tx time 60'      Subjective      Pt reports: no pain in L knee but "just tightness"   Response to previous treatment: soreness  Functional change: lacking knee flexion in gait     Pain: 0/10  Location: left knee      Objective   AAROM L knee flexion 105'    Marielena received therapeutic exercises to develop strength, endurance, ROM, flexibility and core stabilization for 50 minutes including:  Stationary bike 10' full revolution fwd/bwd seat lv 6  Squats <> chair 3x10' 0-80/90'  B shuttle press 2.5 straps 3x15  L SL press 1.5 strap 3x15  Heel slides w/strap 3x10/3"  Bridges 3x10    Not today  L SL hip abd  3x15 2lbs  staning hip extension/abduction 30x each with yellow band   Prone knee flexion stretch 0-60 w/ 20 sec holds 5x   Heel slides 0-60 deg        Marielena received the following manual therapy techniques: Joint mobilizations, Myofacial release and Soft tissue Mobilization were applied to the: left knee for 10 minutes, including: patella medial/lateral mobs, STM, prone hip flexor and quad stretch "       RICE for L knee for 0' for decreased pain, edema and circulation     Home Exercises Provided and Patient Education Provided     Education provided:   PWB with crutches     Written Home Exercises Provided: no .       Assessment     Pt tolerating tx well. Improved ROM demonstrated with full revolution on bike, measurement,  along with improved strength demonstrated with increased wt several exercises.   VC/TC for correcting form/technique with therex and for ROM.. Continue to progress as tolerated    Marielena is progressing well towards her goals.   Pt prognosis is Good.     Pt will continue to benefit from skilled outpatient physical therapy to address the deficits listed in the problem list box on initial evaluation, provide pt/family education and to maximize pt's level of independence in the home and community environment.     Pt's spiritual, cultural and educational needs considered and pt agreeable to plan of care and goals.    Anticipated barriers to physical therapy: none     Goals:    In 4-8 weeks, pt. will:  1. Pt will increase ROM to the left knee to 90 deg of flexion,  in order to perform ADLs and IADLs more effectively   2. Decrease overall pain to the left knee to 3-4/10,  on average with daily activities   3. Increase strength to the left LE to 3+/5 grossly throughout,  in order to perform ADLs and IADLs more effectively   4. Improve FOTO intake score to 45 to demonstrate improvement with functional mobility and use  5. Independent with HEP  Long Term GOALS:  In 8-16 weeks, pt. will:  1. Pt will increase ROM to the left knee to WNL,  in order to perform ADLs and IADLs more effectively   2. Decrease overall pain to left knee to 1-2/10,  on average with daily activities   3. Increase strength to the left LE to 4/5,  in order to perform ADLs and IADLs more effectively   4. Improve FOTO intake score to 70 to demonstrate improvement with functional mobility and use  5. Independent with HEP  6. Return to  full recreational and community walking  unrestricted        Plan     Continue therapy as planned and progress accordingly.  Increase flexion motion over next 4 weeks and continue PT as planned.     Ming Campos, PTA, STS

## 2020-01-14 ENCOUNTER — CLINICAL SUPPORT (OUTPATIENT)
Dept: REHABILITATION | Facility: HOSPITAL | Age: 60
End: 2020-01-14
Payer: COMMERCIAL

## 2020-01-14 DIAGNOSIS — M25.60 RANGE OF MOTION DEFICIT: ICD-10-CM

## 2020-01-14 DIAGNOSIS — R53.1 WEAKNESS: ICD-10-CM

## 2020-01-14 PROCEDURE — 97140 MANUAL THERAPY 1/> REGIONS: CPT | Mod: CQ

## 2020-01-14 PROCEDURE — 97110 THERAPEUTIC EXERCISES: CPT | Mod: CQ

## 2020-01-14 NOTE — PROGRESS NOTES
"                            Physical Therapy Re assessment Note     Name: Marielena Yang [place]  Clinic Number: [id number]    Therapy Diagnosis:   Encounter Diagnoses   Name Primary?    Range of motion deficit     Weakness      Physician: Ran Thomas III, *    Visit Date: 1/14/2020   Physician: Ran Thomas III, *  S82.032A (ICD-10-CM) - Closed displaced transverse fracture of left patella, initial encounter    S82.032A (ICD-10-CM) - Displaced transverse fracture of left patella, initial encounter for closed fracture  Physician Orders: 2    Medical Diagnosis: Eval and treat   Date of Surgery: 9/24/19  Evaluation Date: 10/8/2019  Authorization Period Expiration: 12/31/19  Plan of Care Certification Period: 1/28/20  Visit # / Visits authorized: 14/ 20    Precautions: Weightbearing  POSTOPERATIVE PLAN: Remain PWB for 6 weeks, locked out straight x 4 weeks    Time in 0955  Time out  1055  Tx time 50  Totol tx time 60'      Subjective      Pt reports: no pain this morning.   Response to previous treatment: soreness  Functional change: lacking knee flexion in gait     Pain: 0/10  Location: left knee      Objective   AAROM L knee flexion 105'    Marielena received therapeutic exercises to develop strength, endurance, ROM, flexibility and core stabilization for 40 minutes including:  Stationary bike 10' full revolution fwd/bwd seat lv 6  20" box squats 3x10  B shuttle press 2.5 straps 3x15  L SL press 1.5 strap -two up/one down -eccentric control 5x10  Heel slides w/strap 3x10/3"  Mini squats 30x       Not today  L SL hip abd  3x15 2lbs  staning hip extension/abduction 30x each with yellow band   Prone knee flexion stretch 0-60 w/ 20 sec holds 5x   Heel slides 0-60 deg        Marielena received the following manual therapy techniques: Joint mobilizations, Myofacial release and Soft tissue Mobilization were applied to the: left knee for 10 minutes, including: patella medial/lateral mobs, STM, prone hip flexor and quad " stretch       RICE for L knee for 10' for decreased pain, edema and circulation     Home Exercises Provided and Patient Education Provided     Education provided:   PWB with crutches     Written Home Exercises Provided: no .       Assessment     Pt tolerating tx well. Improved ROM demonstrated with full revolution on bike with resistance, along with improved strength demonstrated with increased wt several exercises.   VC/TC for correcting form/technique with therex and for ROM.. Continue to progress as tolerated    Marielena is progressing well towards her goals.   Pt prognosis is Good.     Pt will continue to benefit from skilled outpatient physical therapy to address the deficits listed in the problem list box on initial evaluation, provide pt/family education and to maximize pt's level of independence in the home and community environment.     Pt's spiritual, cultural and educational needs considered and pt agreeable to plan of care and goals.    Anticipated barriers to physical therapy: none     Goals:    In 4-8 weeks, pt. will:  1. Pt will increase ROM to the left knee to 90 deg of flexion,  in order to perform ADLs and IADLs more effectively   2. Decrease overall pain to the left knee to 3-4/10,  on average with daily activities   3. Increase strength to the left LE to 3+/5 grossly throughout,  in order to perform ADLs and IADLs more effectively   4. Improve FOTO intake score to 45 to demonstrate improvement with functional mobility and use  5. Independent with HEP  Long Term GOALS:  In 8-16 weeks, pt. will:  1. Pt will increase ROM to the left knee to WNL,  in order to perform ADLs and IADLs more effectively   2. Decrease overall pain to left knee to 1-2/10,  on average with daily activities   3. Increase strength to the left LE to 4/5,  in order to perform ADLs and IADLs more effectively   4. Improve FOTO intake score to 70 to demonstrate improvement with functional mobility and use  5. Independent with  HEP  6. Return to full recreational and community walking  unrestricted        Plan     Continue therapy as planned and progress accordingly.  Increase flexion motion over next 4 weeks and continue PT as planned.     Ming Campos, PTA, STS

## 2020-01-16 ENCOUNTER — CLINICAL SUPPORT (OUTPATIENT)
Dept: REHABILITATION | Facility: HOSPITAL | Age: 60
End: 2020-01-16
Payer: COMMERCIAL

## 2020-01-16 DIAGNOSIS — M25.60 RANGE OF MOTION DEFICIT: ICD-10-CM

## 2020-01-16 DIAGNOSIS — R53.1 WEAKNESS: ICD-10-CM

## 2020-01-16 PROCEDURE — 97140 MANUAL THERAPY 1/> REGIONS: CPT

## 2020-01-16 PROCEDURE — 97110 THERAPEUTIC EXERCISES: CPT

## 2020-01-16 NOTE — PROGRESS NOTES
"                            Physical Therapy  Note     Name: Marielena [place]  Clinic Number: [id number]    Therapy Diagnosis:   Encounter Diagnoses   Name Primary?    Range of motion deficit     Weakness      Physician: Ran Thomas III, *    Visit Date: 1/16/2020   Physician: Ran Thomas III, *  S82.032A (ICD-10-CM) - Closed displaced transverse fracture of left patella, initial encounter    S82.032A (ICD-10-CM) - Displaced transverse fracture of left patella, initial encounter for closed fracture  Physician Orders: 2    Medical Diagnosis: Eval and treat   Date of Surgery: 9/24/19  Evaluation Date: 10/8/2019  Authorization Period Expiration: 12/31/19  Plan of Care Certification Period: 1/28/20  Visit # / Visits authorized: 2/ 20    Precautions: Weightbearing  POSTOPERATIVE PLAN: Remain PWB for 6 weeks, locked out straight x 4 weeks    Time in 800  Time out  900  Tx time 50  Totol tx time 60'      Subjective      Pt reports: no pain this morning.   Response to previous treatment: soreness  Functional change: lacking knee flexion in gait     Pain: 0/10  Location: left knee      Objective   AAROM L knee flexion 0-0-105    Marielena received therapeutic exercises to develop strength, endurance, ROM, flexibility and core stabilization for 45 minutes including:  Stationary bike 10' full revolution fwd/bwd seat lv 6  20" box squats 3x10 in staggered stance with left LE in the back position  B shuttle press 1.5 straps LLE only 3x10   L SL press 1.5 strap -two up/one down -eccentric control 5x10  Hip hike on the stairs 30x   SL balance on LLE 3x30 sec             Not today  L SL hip abd  3x15 2lbs  staning hip extension/abduction 30x each with yellow band   Prone knee flexion stretch 0-60 w/ 20 sec holds 5x   Heel slides 0-60 deg        Marielena received the following manual therapy techniques: Joint mobilizations, Myofacial release and Soft tissue Mobilization were applied to the: left knee for 10 minutes, " "including: patella medial/lateral mobs, STM, prone hip flexor and quad stretch, tiger tail STM quad and ITB      RICE for L knee for 10' for decreased pain, edema and circulation     Home Exercises Provided and Patient Education Provided     Education provided:   PWB with crutches     Written Home Exercises Provided: no .       Assessment     Pt tolerating tx well. Improved ROM has been measured at 100-105 deg of flexion for the last two weeks.  Pt notes feeling a "band" around the knee, but otherwise minimal pain.  We did measure knee ext force production today at 65 deg and the left LE is at 75% of the right.  Ambulation is improving, though she continues to walk with a slight limp.    Marielena is progressing well towards her goals.   Pt prognosis is Good.     Pt will continue to benefit from skilled outpatient physical therapy to address the deficits listed in the problem list box on initial evaluation, provide pt/family education and to maximize pt's level of independence in the home and community environment.     Pt's spiritual, cultural and educational needs considered and pt agreeable to plan of care and goals.    Anticipated barriers to physical therapy: none     Goals:    In 4-8 weeks, pt. will:  1. Pt will increase ROM to the left knee to 90 deg of flexion,  in order to perform ADLs and IADLs more effectively   2. Decrease overall pain to the left knee to 3-4/10,  on average with daily activities   3. Increase strength to the left LE to 3+/5 grossly throughout,  in order to perform ADLs and IADLs more effectively   4. Improve FOTO intake score to 45 to demonstrate improvement with functional mobility and use  5. Independent with HEP  Long Term GOALS:  In 8-16 weeks, pt. will:  1. Pt will increase ROM to the left knee to WNL,  in order to perform ADLs and IADLs more effectively   2. Decrease overall pain to left knee to 1-2/10,  on average with daily activities   3. Increase strength to the left LE to 4/5,  " in order to perform ADLs and IADLs more effectively   4. Improve FOTO intake score to 70 to demonstrate improvement with functional mobility and use  5. Independent with HEP  6. Return to full recreational and community walking  unrestricted        Plan     Continue therapy as planned and progress accordingly.  Look to restore flexion to 120 over the next 4 week.      Hamilton Mckeon, PT, STS

## 2020-01-21 ENCOUNTER — CLINICAL SUPPORT (OUTPATIENT)
Dept: REHABILITATION | Facility: HOSPITAL | Age: 60
End: 2020-01-21
Payer: COMMERCIAL

## 2020-01-21 DIAGNOSIS — M25.60 RANGE OF MOTION DEFICIT: ICD-10-CM

## 2020-01-21 DIAGNOSIS — R53.1 WEAKNESS: ICD-10-CM

## 2020-01-21 PROCEDURE — 97110 THERAPEUTIC EXERCISES: CPT

## 2020-01-21 PROCEDURE — 97140 MANUAL THERAPY 1/> REGIONS: CPT

## 2020-01-21 NOTE — PROGRESS NOTES
"                            Physical Therapy  Note     Name: Marielena Yang [place]  Clinic Number: [id number]    Therapy Diagnosis:   Encounter Diagnoses   Name Primary?    Range of motion deficit     Weakness      Physician: Ran Thomas III, *    Visit Date: 1/21/2020   Physician: Ran Thomas III, *  S82.032A (ICD-10-CM) - Closed displaced transverse fracture of left patella, initial encounter    S82.032A (ICD-10-CM) - Displaced transverse fracture of left patella, initial encounter for closed fracture  Physician Orders: 2    Medical Diagnosis: Eval and treat   Date of Surgery: 9/24/19  Evaluation Date: 10/8/2019  Authorization Period Expiration: 12/31/19  Plan of Care Certification Period: 1/28/20  Visit # / Visits authorized: 3/ 20    Precautions: Weightbearing  POSTOPERATIVE PLAN: Remain PWB for 6 weeks, locked out straight x 4 weeks    Time in 900  Time out  1000  Tx time 50  Totol tx time 60'      Subjective      Pt reports: no pain this morning.   Response to previous treatment: soreness  Functional change: more normalized gait pattern this AM    Pain: 0/10  Location: left knee      Objective   AAROM L knee flexion 0-0-105    Marielena received therapeutic exercises to develop strength, endurance, ROM, flexibility and core stabilization for 45 minutes including:  Stationary bike 10' recumbent bike   Mini band lateral walking 30'x4  Leg press 50lbs 3x10  Lunge matrix 20x each direction bilaterally   20" box squats 3x10 in staggered stance with left LE in the back position               Not today  L SL hip abd  3x15 2lbs  staning hip extension/abduction 30x each with yellow band   Prone knee flexion stretch 0-60 w/ 20 sec holds 5x   Heel slides 0-60 deg        Marielena received the following manual therapy techniques: Joint mobilizations, Myofacial release and Soft tissue Mobilization were applied to the: left knee for 10 minutes, including: patella medial/lateral mobs, STM, prone hip flexor and quad " stretch, tiger tail STM quad and ITB      RICE for L knee for 10' for decreased pain, edema and circulation     Home Exercises Provided and Patient Education Provided     Education provided:   PWB with crutches     Written Home Exercises Provided: no .       Assessment     Pt tolerating tx well. It did appear that patient was ambulating better today and prior to session she denied pain in the knee.  Continued with patella mobilizations in all directions and pt is still sensitive with palpation and pressure to the knee.  We initiated lunge matrix today and this was given as HEP.  Overall, making good progress.    Marielena is progressing well towards her goals.   Pt prognosis is Good.     Pt will continue to benefit from skilled outpatient physical therapy to address the deficits listed in the problem list box on initial evaluation, provide pt/family education and to maximize pt's level of independence in the home and community environment.     Pt's spiritual, cultural and educational needs considered and pt agreeable to plan of care and goals.    Anticipated barriers to physical therapy: none     Goals:    In 4-8 weeks, pt. will:  1. Pt will increase ROM to the left knee to 90 deg of flexion,  in order to perform ADLs and IADLs more effectively   2. Decrease overall pain to the left knee to 3-4/10,  on average with daily activities   3. Increase strength to the left LE to 3+/5 grossly throughout,  in order to perform ADLs and IADLs more effectively   4. Improve FOTO intake score to 45 to demonstrate improvement with functional mobility and use  5. Independent with HEP  Long Term GOALS:  In 8-16 weeks, pt. will:  1. Pt will increase ROM to the left knee to WNL,  in order to perform ADLs and IADLs more effectively   2. Decrease overall pain to left knee to 1-2/10,  on average with daily activities   3. Increase strength to the left LE to 4/5,  in order to perform ADLs and IADLs more effectively   4. Improve FOTO intake  score to 70 to demonstrate improvement with functional mobility and use  5. Independent with HEP  6. Return to full recreational and community walking  unrestricted        Plan     Continue therapy as planned and progress accordingly.      Hamilton Mckeon, PT, STS

## 2020-01-23 ENCOUNTER — CLINICAL SUPPORT (OUTPATIENT)
Dept: REHABILITATION | Facility: HOSPITAL | Age: 60
End: 2020-01-23
Payer: COMMERCIAL

## 2020-01-23 DIAGNOSIS — R53.1 WEAKNESS: ICD-10-CM

## 2020-01-23 DIAGNOSIS — M25.60 RANGE OF MOTION DEFICIT: ICD-10-CM

## 2020-01-23 PROCEDURE — 97110 THERAPEUTIC EXERCISES: CPT

## 2020-01-23 PROCEDURE — 97140 MANUAL THERAPY 1/> REGIONS: CPT

## 2020-01-23 NOTE — PROGRESS NOTES
"                            Physical Therapy  Note     Name: Marielena Yang [place]  Clinic Number: [id number]    Therapy Diagnosis:   Encounter Diagnoses   Name Primary?    Range of motion deficit     Weakness      Physician: Ran Thomas III, *    Visit Date: 1/23/2020   Physician: Ran Thomas III, *  S82.032A (ICD-10-CM) - Closed displaced transverse fracture of left patella, initial encounter    S82.032A (ICD-10-CM) - Displaced transverse fracture of left patella, initial encounter for closed fracture  Physician Orders: 2    Medical Diagnosis: Eval and treat   Date of Surgery: 9/24/19  Evaluation Date: 10/8/2019  Authorization Period Expiration: 12/31/19  Plan of Care Certification Period: 1/28/20  Visit # / Visits authorized: 3/ 20    Precautions: Weightbearing  POSTOPERATIVE PLAN: Remain PWB for 6 weeks, locked out straight x 4 weeks    Time in 900  Time out  1000  Tx time 50  Totol tx time 60'      Subjective      Pt reports: no pain this morning.   Response to previous treatment: soreness  Functional change: more normalized gait pattern this AM    Pain: 0/10  Location: left knee      Objective   AAROM L knee flexion 0-0-105    Marielena received therapeutic exercises to develop strength, endurance, ROM, flexibility and core stabilization for 45 minutes including:  Stationary bike 10' recumbent bike   Mini band lateral walking 30'x4  Prone knee stretch 10x10 sec   Wall sits 3x20 sec with left LE in back split position   Resisted retro/lateral walking 40'x 2  For each with red cook band   Resisted forwqrd gait 40'x2    20" box squats 3x10 in staggered stance with left LE in the back position               Not today  L SL hip abd  3x15 2lbs  staning hip extension/abduction 30x each with yellow band   Prone knee flexion stretch 0-60 w/ 20 sec holds 5x   Heel slides 0-60 deg        Marielena received the following manual therapy techniques: Joint mobilizations, Myofacial release and Soft tissue Mobilization " were applied to the: left knee for 10 minutes, including: patella medial/lateral mobs, STM, prone hip flexor and quad stretch, tiger tail STM quad and ITB      RICE for L knee for 10' for decreased pain, edema and circulation     Home Exercises Provided and Patient Education Provided     Education provided:   PWB with crutches     Written Home Exercises Provided: no .       Assessment     Pt tolerating tx well. Initiated resistance gait in all directions and continued with ROM progression and strengthening.  Pt was able to perform all exercises without any issues.  We will continue per POC.     Marielena is progressing well towards her goals.   Pt prognosis is Good.     Pt will continue to benefit from skilled outpatient physical therapy to address the deficits listed in the problem list box on initial evaluation, provide pt/family education and to maximize pt's level of independence in the home and community environment.     Pt's spiritual, cultural and educational needs considered and pt agreeable to plan of care and goals.    Anticipated barriers to physical therapy: none     Goals:    In 4-8 weeks, pt. will:  1. Pt will increase ROM to the left knee to 90 deg of flexion,  in order to perform ADLs and IADLs more effectively   2. Decrease overall pain to the left knee to 3-4/10,  on average with daily activities   3. Increase strength to the left LE to 3+/5 grossly throughout,  in order to perform ADLs and IADLs more effectively   4. Improve FOTO intake score to 45 to demonstrate improvement with functional mobility and use  5. Independent with HEP  Long Term GOALS:  In 8-16 weeks, pt. will:  1. Pt will increase ROM to the left knee to WNL,  in order to perform ADLs and IADLs more effectively   2. Decrease overall pain to left knee to 1-2/10,  on average with daily activities   3. Increase strength to the left LE to 4/5,  in order to perform ADLs and IADLs more effectively   4. Improve FOTO intake score to 70 to  demonstrate improvement with functional mobility and use  5. Independent with HEP  6. Return to full recreational and community walking  unrestricted        Plan     Continue therapy as planned and progress accordingly.      Hamilton Mckeon, PT, STS

## 2020-01-28 ENCOUNTER — CLINICAL SUPPORT (OUTPATIENT)
Dept: REHABILITATION | Facility: HOSPITAL | Age: 60
End: 2020-01-28
Payer: COMMERCIAL

## 2020-01-28 DIAGNOSIS — M25.60 RANGE OF MOTION DEFICIT: ICD-10-CM

## 2020-01-28 DIAGNOSIS — R53.1 WEAKNESS: ICD-10-CM

## 2020-01-28 PROCEDURE — 97140 MANUAL THERAPY 1/> REGIONS: CPT

## 2020-01-28 PROCEDURE — 97110 THERAPEUTIC EXERCISES: CPT

## 2020-01-30 ENCOUNTER — CLINICAL SUPPORT (OUTPATIENT)
Dept: REHABILITATION | Facility: HOSPITAL | Age: 60
End: 2020-01-30
Payer: COMMERCIAL

## 2020-01-30 DIAGNOSIS — M25.60 RANGE OF MOTION DEFICIT: ICD-10-CM

## 2020-01-30 DIAGNOSIS — R53.1 WEAKNESS: ICD-10-CM

## 2020-01-30 PROCEDURE — 97140 MANUAL THERAPY 1/> REGIONS: CPT

## 2020-01-30 PROCEDURE — 97110 THERAPEUTIC EXERCISES: CPT

## 2020-01-30 NOTE — PROGRESS NOTES
Physical Therapy  Note     Name: Marielena Yang [place]  Clinic Number: [id number]    Therapy Diagnosis:   Encounter Diagnoses   Name Primary?    Range of motion deficit     Weakness      Physician: Ran Thomas III, *    Visit Date: 1/28/2020   Physician: Ran Thomas III, *  S82.032A (ICD-10-CM) - Closed displaced transverse fracture of left patella, initial encounter    S82.032A (ICD-10-CM) - Displaced transverse fracture of left patella, initial encounter for closed fracture  Physician Orders: 2    Medical Diagnosis: Eval and treat   Date of Surgery: 9/24/19  Evaluation Date: 10/8/2019  Authorization Period Expiration: 12/31/19  Plan of Care Certification Period: 1/28/20  Visit # / Visits authorized: 3/ 20    Precautions: Weightbearing  POSTOPERATIVE PLAN: Remain PWB for 6 weeks, locked out straight x 4 weeks    Time in 900  Time out  1000  Tx time 50  Totol tx time 60'      Subjective      Pt reports: no pain this morning.   Response to previous treatment: soreness  Functional change: more normalized gait pattern this AM    Pain: 0/10  Location: left knee      Objective   AAROM L knee flexion 0-0-105    Marielena received therapeutic exercises to develop strength, endurance, ROM, flexibility and core stabilization for 45 minutes including:  Stationary bike 10' recumbent bike   Mini band lateral walking 30'x4  Prone knee stretch 10x10 sec   Wall sits 3x20 sec with left LE in back split position   Allen step overs, high hurdles, lateral and forward   Bridge on table 30x   Bridge on ball with HS bias 30x                  Not today  L SL hip abd  3x15 2lbs  staning hip extension/abduction 30x each with yellow band   Prone knee flexion stretch 0-60 w/ 20 sec holds 5x   Heel slides 0-60 deg        Marielena received the following manual therapy techniques: Joint mobilizations, Myofacial release and Soft tissue Mobilization were applied to the: left knee for 10 minutes, including:  patella medial/lateral mobs, STM, prone hip flexor and quad stretch, tiger tail STM quad and ITB      RICE for L knee for 10' for decreased pain, edema and circulation     Home Exercises Provided and Patient Education Provided     Education provided:   PWB with crutches     Written Home Exercises Provided: no .       Assessment     Pt tolerating tx well. She noted some post knee pain at the beginning of the session due to over stretching into extension by accident last night.  Pt had her leg propped up for an extended amount of time and noted soreness in the post knee region after.  We continued to work on ROM and general strengthening of the left knee.  Look to begin more focused hamstring strengthening.  We will continue per POC.     Marielena is progressing well towards her goals.   Pt prognosis is Good.     Pt will continue to benefit from skilled outpatient physical therapy to address the deficits listed in the problem list box on initial evaluation, provide pt/family education and to maximize pt's level of independence in the home and community environment.     Pt's spiritual, cultural and educational needs considered and pt agreeable to plan of care and goals.    Anticipated barriers to physical therapy: none     Goals:    In 4-8 weeks, pt. will:  1. Pt will increase ROM to the left knee to 90 deg of flexion,  in order to perform ADLs and IADLs more effectively   2. Decrease overall pain to the left knee to 3-4/10,  on average with daily activities   3. Increase strength to the left LE to 3+/5 grossly throughout,  in order to perform ADLs and IADLs more effectively   4. Improve FOTO intake score to 45 to demonstrate improvement with functional mobility and use  5. Independent with HEP  Long Term GOALS:  In 8-16 weeks, pt. will:  1. Pt will increase ROM to the left knee to WNL,  in order to perform ADLs and IADLs more effectively   2. Decrease overall pain to left knee to 1-2/10,  on average with daily  activities   3. Increase strength to the left LE to 4/5,  in order to perform ADLs and IADLs more effectively   4. Improve FOTO intake score to 70 to demonstrate improvement with functional mobility and use  5. Independent with HEP  6. Return to full recreational and community walking  unrestricted        Plan     Continue therapy as planned and progress accordingly.      Hamilton Mckeon, PT, STS

## 2020-01-31 NOTE — PROGRESS NOTES
Physical Therapy  Note     Name: Marielena Yang [place]  Clinic Number: [id number]    Therapy Diagnosis:   Encounter Diagnoses   Name Primary?    Range of motion deficit     Weakness      Physician: Ran Thomas III, *    Visit Date: 1/30/2020   Physician: Ran Thomas III, *  S82.032A (ICD-10-CM) - Closed displaced transverse fracture of left patella, initial encounter    S82.032A (ICD-10-CM) - Displaced transverse fracture of left patella, initial encounter for closed fracture  Physician Orders: 2    Medical Diagnosis: Eval and treat   Date of Surgery: 9/24/19  Evaluation Date: 10/8/2019  Authorization Period Expiration: 12/31/19  Plan of Care Certification Period: 1/28/20  Visit # / Visits authorized: 3/ 20    Precautions: Weightbearing  POSTOPERATIVE PLAN: Remain PWB for 6 weeks, locked out straight x 4 weeks    Time in 900  Time out  1000  Tx time 50  Totol tx time 60'      Subjective      Pt reports: no pain this morning.   Response to previous treatment: soreness  Functional change: more normalized gait pattern this AM    Pain: 0/10  Location: left knee      Objective   AAROM L knee flexion 0-0-105    Marielena received therapeutic exercises to develop strength, endurance, ROM, flexibility and core stabilization for 45 minutes including:  Stationary bike 10' recumbent bike   Mini band lateral walking 30'x4  Prone knee stretch 10x10 sec   Wall sits 3x20 sec with left LE in back split position   Allen step overs, high hurdles, lateral and forward   Bridge on table 30x   Bridge on ball with HS bias 30x   Hip 4 way maching 20lbs abduction and hip extension   Leg press 80lbs DL 3x15   Squats on box, staggered stance with right LE in back 30x                Not today  L SL hip abd  3x15 2lbs  staning hip extension/abduction 30x each with yellow band   Prone knee flexion stretch 0-60 w/ 20 sec holds 5x   Heel slides 0-60 deg        Marielena received the following manual therapy  techniques: Joint mobilizations, Myofacial release and Soft tissue Mobilization were applied to the: left knee for 10 minutes, including: patella medial/lateral mobs, STM, prone hip flexor and quad stretch, tiger tail STM quad and ITB      RICE for L knee for 10' for decreased pain, edema and circulation     Home Exercises Provided and Patient Education Provided     Education provided:   PWB with crutches     Written Home Exercises Provided: no .       Assessment     Pt tolerating tx well. Normal tightness noted in knee throughout treatment session.  We continued to work on hip strenghtening and overall quad/hs strength.  Pt appears to be ambulating much better however her knee flexion remains at 105 deg.  I have reached out to her surgeon on behalf of pt for follow up appointment.   We will continue per POC.     Marielena is progressing well towards her goals.   Pt prognosis is Good.     Pt will continue to benefit from skilled outpatient physical therapy to address the deficits listed in the problem list box on initial evaluation, provide pt/family education and to maximize pt's level of independence in the home and community environment.     Pt's spiritual, cultural and educational needs considered and pt agreeable to plan of care and goals.    Anticipated barriers to physical therapy: none     Goals:    In 4-8 weeks, pt. will:  1. Pt will increase ROM to the left knee to 90 deg of flexion,  in order to perform ADLs and IADLs more effectively   2. Decrease overall pain to the left knee to 3-4/10,  on average with daily activities   3. Increase strength to the left LE to 3+/5 grossly throughout,  in order to perform ADLs and IADLs more effectively   4. Improve FOTO intake score to 45 to demonstrate improvement with functional mobility and use  5. Independent with HEP  Long Term GOALS:  In 8-16 weeks, pt. will:  1. Pt will increase ROM to the left knee to WNL,  in order to perform ADLs and IADLs more effectively   2.  Decrease overall pain to left knee to 1-2/10,  on average with daily activities   3. Increase strength to the left LE to 4/5,  in order to perform ADLs and IADLs more effectively   4. Improve FOTO intake score to 70 to demonstrate improvement with functional mobility and use  5. Independent with HEP  6. Return to full recreational and community walking  unrestricted        Plan     Continue therapy as planned and progress accordingly.      Hamilton Mckeon, PT, STS

## 2020-02-01 DIAGNOSIS — G47.00 INSOMNIA, UNSPECIFIED TYPE: ICD-10-CM

## 2020-02-01 RX ORDER — HYDROXYZINE HYDROCHLORIDE 25 MG/1
TABLET, FILM COATED ORAL
Qty: 30 TABLET | Refills: 1 | Status: SHIPPED | OUTPATIENT
Start: 2020-02-01 | End: 2020-02-23 | Stop reason: SDUPTHER

## 2020-02-04 ENCOUNTER — CLINICAL SUPPORT (OUTPATIENT)
Dept: REHABILITATION | Facility: HOSPITAL | Age: 60
End: 2020-02-04
Payer: COMMERCIAL

## 2020-02-04 DIAGNOSIS — R53.1 WEAKNESS: ICD-10-CM

## 2020-02-04 DIAGNOSIS — M25.60 RANGE OF MOTION DEFICIT: ICD-10-CM

## 2020-02-04 PROCEDURE — 97110 THERAPEUTIC EXERCISES: CPT

## 2020-02-04 PROCEDURE — 97140 MANUAL THERAPY 1/> REGIONS: CPT

## 2020-02-04 NOTE — PROGRESS NOTES
Physical Therapy  Note      Name: Marielena Yang [place]  Clinic Number: [id number]    Therapy Diagnosis:   Encounter Diagnoses   Name Primary?    Range of motion deficit     Weakness      Physician: Ran Thomas III, *    Visit Date: 2/4/2020   Physician: Ran Thomas III, *  S82.032A (ICD-10-CM) - Closed displaced transverse fracture of left patella, initial encounter    S82.032A (ICD-10-CM) - Displaced transverse fracture of left patella, initial encounter for closed fracture  Physician Orders: 2    Medical Diagnosis: Eval and treat   Date of Surgery: 9/24/19  Evaluation Date: 10/8/2019  Authorization Period Expiration: 12/31/19  Plan of Care Certification Period: 1/28/20  Visit # / Visits authorized: 4/ 20    Precautions: Weightbearing  POSTOPERATIVE PLAN: Remain PWB for 6 weeks, locked out straight x 4 weeks    Time in 900  Time out  1000  Tx time 50  Totol tx time 60'      Subjective      Pt reports: no pain this morning.   Response to previous treatment: soreness  Functional change: more normalized gait pattern this AM    Pain: 0/10  Location: left knee      Objective   AAROM L knee flexion 0-0-105    Marielena received therapeutic exercises to develop strength, endurance, ROM, flexibility and core stabilization for 45 minutes including:  Stationary bike 10' recumbent bike   Mini band lateral walking 30'x4  Prone knee stretch 10x10 sec   SL knee bends holding onto rack 30x   Lateral step downs 30x 2 inch box   Hip 4 way maching 20lbs abduction and hip extension   Leg press 80lbs DL 3x15   HS curl machine 10lbs SL 3x10                  Not today  L SL hip abd  3x15 2lbs  staning hip extension/abduction 30x each with yellow band   Prone knee flexion stretch 0-60 w/ 20 sec holds 5x   Heel slides 0-60 deg        Marielena received the following manual therapy techniques: Joint mobilizations, Myofacial release and Soft tissue Mobilization were applied to the: left knee for 15  "minutes, including: patella medial/lateral mobs, STM, prone hip flexor and quad stretch, tiger tail STM quad and ITB      RICE for L knee for 10' for decreased pain, edema and circulation     Home Exercises Provided and Patient Education Provided     Education provided:   PWB with crutches     Written Home Exercises Provided: no .       Assessment     Pt tolerating tx well. Pt states that over the weekend she felt "normal almost" with walking.  We continued her exercises from previous session with the addition of lateral step downs and knee bends in SL position.  Overall progressing well.    We will continue per POC.     Marielena is progressing well towards her goals.   Pt prognosis is Good.     Pt will continue to benefit from skilled outpatient physical therapy to address the deficits listed in the problem list box on initial evaluation, provide pt/family education and to maximize pt's level of independence in the home and community environment.     Pt's spiritual, cultural and educational needs considered and pt agreeable to plan of care and goals.    Anticipated barriers to physical therapy: none     Goals:    In 4-8 weeks, pt. will:  1. Pt will increase ROM to the left knee to 90 deg of flexion,  in order to perform ADLs and IADLs more effectively   2. Decrease overall pain to the left knee to 3-4/10,  on average with daily activities   3. Increase strength to the left LE to 3+/5 grossly throughout,  in order to perform ADLs and IADLs more effectively   4. Improve FOTO intake score to 45 to demonstrate improvement with functional mobility and use  5. Independent with HEP  Long Term GOALS:  In 8-16 weeks, pt. will:  1. Pt will increase ROM to the left knee to WNL,  in order to perform ADLs and IADLs more effectively   2. Decrease overall pain to left knee to 1-2/10,  on average with daily activities   3. Increase strength to the left LE to 4/5,  in order to perform ADLs and IADLs more effectively   4. Improve " FOTO intake score to 70 to demonstrate improvement with functional mobility and use  5. Independent with HEP  6. Return to full recreational and community walking  unrestricted        Plan     Continue therapy as planned and progress accordingly.      Hamilton Mckeon, PT, STS

## 2020-02-05 ENCOUNTER — PATIENT MESSAGE (OUTPATIENT)
Dept: SPORTS MEDICINE | Facility: CLINIC | Age: 60
End: 2020-02-05

## 2020-02-06 ENCOUNTER — CLINICAL SUPPORT (OUTPATIENT)
Dept: REHABILITATION | Facility: HOSPITAL | Age: 60
End: 2020-02-06
Payer: COMMERCIAL

## 2020-02-06 DIAGNOSIS — R53.1 WEAKNESS: ICD-10-CM

## 2020-02-06 DIAGNOSIS — M25.60 RANGE OF MOTION DEFICIT: ICD-10-CM

## 2020-02-06 PROCEDURE — 97110 THERAPEUTIC EXERCISES: CPT

## 2020-02-06 PROCEDURE — 97140 MANUAL THERAPY 1/> REGIONS: CPT

## 2020-02-07 ENCOUNTER — PATIENT OUTREACH (OUTPATIENT)
Dept: ADMINISTRATIVE | Facility: OTHER | Age: 60
End: 2020-02-07

## 2020-02-07 NOTE — PROGRESS NOTES
Physical Therapy  Note      Name: Marielena Yang [place]  Clinic Number: [id number]    Therapy Diagnosis:   Encounter Diagnoses   Name Primary?    Range of motion deficit     Weakness      Physician: Ran Thomas III, *    Visit Date: 2/6/2020   Physician: Ran Thomas III, *  S82.032A (ICD-10-CM) - Closed displaced transverse fracture of left patella, initial encounter    S82.032A (ICD-10-CM) - Displaced transverse fracture of left patella, initial encounter for closed fracture  Physician Orders: 2    Medical Diagnosis: Eval and treat   Date of Surgery: 9/24/19  Evaluation Date: 10/8/2019  Authorization Period Expiration: 12/31/19  Plan of Care Certification Period: 1/28/20  Visit # / Visits authorized: 7/ 20    Precautions: Weightbearing  POSTOPERATIVE PLAN: Remain PWB for 6 weeks, locked out straight x 4 weeks    Time in 1100  Time out  1200  Tx time 50  Totol tx time 60'      Subjective      Pt reports: no pain this morning.   Response to previous treatment: soreness  Functional change: more normalized gait pattern this AM    Pain: 0/10  Location: left knee      Objective   AAROM L knee flexion 0-0-105    Marielena received therapeutic exercises to develop strength, endurance, ROM, flexibility and core stabilization for 47 minutes including:  Stationary bike 10' recumbent bike   Mini band lateral walking 30'x4  Prone knee stretch 10x10 sec   SL knee bends holding onto rack 30x   Lateral step downs 30x 2 inch box   Hip 4 way maching 20lbs abduction and hip extension   Leg press 80lbs DL 3x15   HS curl machine 10lbs SL 3x10   SL squats 45x 0-45 deg   SL balance 3x30 sec                  Not today  L SL hip abd  3x15 2lbs  staning hip extension/abduction 30x each with yellow band   Prone knee flexion stretch 0-60 w/ 20 sec holds 5x   Heel slides 0-60 deg        Marielena received the following manual therapy techniques: Joint mobilizations, Myofacial release and Soft tissue  Mobilization were applied to the: left knee for 15 minutes, including: patella medial/lateral mobs, STM, prone hip flexor and quad stretch, tiger tail STM quad and ITB      RICE for L knee for 10' for decreased pain, edema and circulation     Home Exercises Provided and Patient Education Provided     Education provided:   PWB with crutches     Written Home Exercises Provided: no .       Assessment     Pt tolerating tx well.  We continue to work on balance and general strengthening of her left LE.  Pt is ambulating much better and she states that she can sense this.  Also, is tolerant to increased activities on her feet.  Overall, progressing well.          Marielena is progressing well towards her goals.   Pt prognosis is Good.     Pt will continue to benefit from skilled outpatient physical therapy to address the deficits listed in the problem list box on initial evaluation, provide pt/family education and to maximize pt's level of independence in the home and community environment.     Pt's spiritual, cultural and educational needs considered and pt agreeable to plan of care and goals.    Anticipated barriers to physical therapy: none     Goals:    In 4-8 weeks, pt. will:  1. Pt will increase ROM to the left knee to 90 deg of flexion,  in order to perform ADLs and IADLs more effectively   2. Decrease overall pain to the left knee to 3-4/10,  on average with daily activities   3. Increase strength to the left LE to 3+/5 grossly throughout,  in order to perform ADLs and IADLs more effectively   4. Improve FOTO intake score to 45 to demonstrate improvement with functional mobility and use  5. Independent with HEP  Long Term GOALS:  In 8-16 weeks, pt. will:  1. Pt will increase ROM to the left knee to WNL,  in order to perform ADLs and IADLs more effectively   2. Decrease overall pain to left knee to 1-2/10,  on average with daily activities   3. Increase strength to the left LE to 4/5,  in order to perform ADLs and  IADLs more effectively   4. Improve FOTO intake score to 70 to demonstrate improvement with functional mobility and use  5. Independent with HEP  6. Return to full recreational and community walking  unrestricted        Plan     Continue therapy as planned and progress accordingly.      Hamilton Mckeon, PT, STS

## 2020-02-07 NOTE — PROGRESS NOTES
Care Everywhere: updated   Immunization: updated  Health Maintenance: updated  Media Review: reviewed for possible outside colonoscopy report   Legacy Review: n/a  Order placed: n/a  Upcoming appts:n/a

## 2020-02-10 ENCOUNTER — OFFICE VISIT (OUTPATIENT)
Dept: SPORTS MEDICINE | Facility: CLINIC | Age: 60
End: 2020-02-10
Payer: COMMERCIAL

## 2020-02-10 VITALS
HEIGHT: 64 IN | HEART RATE: 73 BPM | WEIGHT: 195 LBS | DIASTOLIC BLOOD PRESSURE: 84 MMHG | SYSTOLIC BLOOD PRESSURE: 122 MMHG | BODY MASS INDEX: 33.29 KG/M2

## 2020-02-10 DIAGNOSIS — S86.812S PATELLAR TENDON RUPTURE, LEFT, SEQUELA: Primary | ICD-10-CM

## 2020-02-10 DIAGNOSIS — M25.562 LEFT KNEE PAIN, UNSPECIFIED CHRONICITY: ICD-10-CM

## 2020-02-10 PROCEDURE — 3079F DIAST BP 80-89 MM HG: CPT | Mod: CPTII,S$GLB,, | Performed by: ORTHOPAEDIC SURGERY

## 2020-02-10 PROCEDURE — 3074F PR MOST RECENT SYSTOLIC BLOOD PRESSURE < 130 MM HG: ICD-10-PCS | Mod: CPTII,S$GLB,, | Performed by: ORTHOPAEDIC SURGERY

## 2020-02-10 PROCEDURE — 3008F BODY MASS INDEX DOCD: CPT | Mod: CPTII,S$GLB,, | Performed by: ORTHOPAEDIC SURGERY

## 2020-02-10 PROCEDURE — 99214 OFFICE O/P EST MOD 30 MIN: CPT | Mod: S$GLB,,, | Performed by: ORTHOPAEDIC SURGERY

## 2020-02-10 PROCEDURE — 99214 PR OFFICE/OUTPT VISIT, EST, LEVL IV, 30-39 MIN: ICD-10-PCS | Mod: S$GLB,,, | Performed by: ORTHOPAEDIC SURGERY

## 2020-02-10 PROCEDURE — 99999 PR PBB SHADOW E&M-EST. PATIENT-LVL III: ICD-10-PCS | Mod: PBBFAC,,, | Performed by: ORTHOPAEDIC SURGERY

## 2020-02-10 PROCEDURE — 99999 PR PBB SHADOW E&M-EST. PATIENT-LVL III: CPT | Mod: PBBFAC,,, | Performed by: ORTHOPAEDIC SURGERY

## 2020-02-10 PROCEDURE — 3008F PR BODY MASS INDEX (BMI) DOCUMENTED: ICD-10-PCS | Mod: CPTII,S$GLB,, | Performed by: ORTHOPAEDIC SURGERY

## 2020-02-10 PROCEDURE — 3079F PR MOST RECENT DIASTOLIC BLOOD PRESSURE 80-89 MM HG: ICD-10-PCS | Mod: CPTII,S$GLB,, | Performed by: ORTHOPAEDIC SURGERY

## 2020-02-10 PROCEDURE — 3074F SYST BP LT 130 MM HG: CPT | Mod: CPTII,S$GLB,, | Performed by: ORTHOPAEDIC SURGERY

## 2020-02-10 NOTE — PROGRESS NOTES
"CC: Left knee pain    HISTORY OF PRESENT ILLNESS:   Pt is here today for post-operative follow up of knee surgery.  She is now 4.5 months from patellar tendon repair. She is no longer wearing her brace with no issues. Pain well controlled. Has been active with PT here at [place] working with [facility] 2x/week. No pain. Denies fevers/chills     DATE OF PROCEDURE: 2019      SURGEON: Danielle Cloud M.D.     OPERATION:   1.  Partial inferior pole patellectomy of the left patella with patellar tendon advancement/reconstruction CPT 68856 - 22 modifier        Review of Systems   Constitution: Negative. Negative for chills, fever and night sweats.   HENT: Negative for congestion and headaches.    Eyes: Negative for blurred vision, left vision loss and right vision loss.   Cardiovascular: Negative for chest pain and syncope.   Respiratory: Negative for cough and shortness of breath.    Endocrine: Negative for polydipsia, polyphagia and polyuria.   Hematologic/Lymphatic: Negative for bleeding problem. Does not bruise/bleed easily.   Skin: Negative for dry skin, itching and rash.   Musculoskeletal: Negative for falls and muscle weakness.   Gastrointestinal: Negative for abdominal pain and bowel incontinence.   Genitourinary: Negative for bladder incontinence and nocturia.   Neurological: Negative for disturbances in coordination, loss of balance and seizures.   Psychiatric/Behavioral: Negative for depression. The patient does not have insomnia.    Allergic/Immunologic: Negative for hives and persistent infections.         PAST MEDICAL HISTORY:   Past Medical History:   Diagnosis Date    Anxiety and depression 2014    Colon polyps     Glaucoma     Hyperlipidemia     Hypertension     pt states " i don't have high blood pressure"    Keloid cicatrix     Obesity (BMI 30.0-34.9)      PAST SURGICAL HISTORY:   Past Surgical History:   Procedure Laterality Date     SECTION      x2    COLONOSCOPY N/A 2016    " Procedure: COLONOSCOPY;  Surgeon: SHANNON Nj MD;  [facility]  Service: Endoscopy;  Laterality: N/A;    KNEE SURGERY Left 09/24/2019    PATELLAR TENDON REPAIR Left 9/24/2019    Procedure: REPAIR/RECONSTRUCTION TENDON, PATELLAR;  Surgeon: Danielle Cloud MD;  Location: [facility];  Service: Orthopedics;  Laterality: Left;     FAMILY HISTORY:   Family History   Problem Relation Age of Onset    Dementia Father     Breast cancer Sister     Cancer Paternal Grandmother     Cancer Brother     Glaucoma Maternal Uncle     Colon cancer Neg Hx     Ovarian cancer Neg Hx     Blindness Neg Hx     Macular degeneration Neg Hx     Retinal detachment Neg Hx      SOCIAL HISTORY:   Social History     Socioeconomic History    Marital status:      Spouse name: Not on file    Number of children: Not on file    Years of education: Not on file    Highest education level: Not on file   Occupational History    Occupation: Manager     Employer: [employer]   Social Needs    Financial resource strain: Not on file    Food insecurity:     Worry: Not on file     Inability: Not on file    Transportation needs:     Medical: No     Non-medical: No   Tobacco Use    Smoking status: Former Smoker    Smokeless tobacco: Never Used   Substance and Sexual Activity    Alcohol use: Yes     Frequency: 2-3 times a week     Drinks per session: 1 or 2     Comment: few times a week     Drug use: No    Sexual activity: Yes     Partners: Male     Birth control/protection: Post-menopausal   Lifestyle    Physical activity:     Days per week: Not on file     Minutes per session: Not on file    Stress: Only a little   Relationships    Social connections:     Talks on phone: Twice a week     Gets together: Once a week     Attends [employer]ism service: Not on file     Active member of club or organization: Not on file     Attends meetings of clubs or organizations: Not on file     Relationship status:    Other  "Topics Concern    Are you pregnant or think you may be? No    Breast-feeding No   Social History Narrative    Not on file       MEDICATIONS:   Current Outpatient Medications:     ascorbic acid, vitamin C, (VITAMIN C) 100 MG tablet, Take 100 mg by mouth 2 (two) times daily. , Disp: , Rfl:     aspirin (ECOTRIN) 81 MG EC tablet, Take 81 mg by mouth once daily., Disp: , Rfl:     CALCIUM CARBONATE/VITAMIN D3 (VITAMIN D-3 ORAL), Take 1,000 mg by mouth once daily., Disp: , Rfl:     citalopram (CELEXA) 40 MG tablet, Take 1 tablet (40 mg total) by mouth once daily., Disp: 90 tablet, Rfl: 4    fish oil-omega-3 fatty acids 300-1,000 mg capsule, Take 2 g by mouth once daily., Disp: , Rfl:     hydrOXYzine HCl (ATARAX) 25 MG tablet, TAKE 1/2 TO 1 TAB NIGHTLY AS NEEDED FOR INSOMNIA, Disp: 30 tablet, Rfl: 1    LORazepam (ATIVAN) 1 MG tablet, TAKE 1 TABLET (1 MG TOTAL) BY MOUTH EVERY EVENING., Disp: 30 tablet, Rfl: 5    ondansetron (ZOFRAN-ODT) 4 MG TbDL, Take 1 tablet (4 mg total) by mouth every 6 (six) hours as needed., Disp: 18 tablet, Rfl: 0    simvastatin (ZOCOR) 40 MG tablet, Take 1 tablet (40 mg total) by mouth once daily., Disp: 90 tablet, Rfl: 4    urea 50 % Crea, Apply 1 application topically once daily. Apply to affected area, Disp: 255 g, Rfl: 3  ALLERGIES:   Review of patient's allergies indicates:   Allergen Reactions    Sulfa (sulfonamide antibiotics)        VITAL SIGNS: /84   Pulse 73   Ht 5' 4" (1.626 m)   Wt 88.5 kg (195 lb)   BMI 33.47 kg/m²                                                                                  PHYSICAL EXAMINATION:     Incision sites healed well  No evidence of any erythema, infection or induration  Decreased mobility of patella  Range of motion 0-120 active.   No effusion  2+ DP pulse  No swelling, no calf tenderness  - Hellen's sign  Negative medial joint line tendernes  Moderate quad atrophy                                                                       "           ASSESSMENT:                                                                                                                                               1. Status post above, doing well. .                                                                                                                            PLAN:                                                                                                                                                     1. Continue with PT and HEP.   2. Emphasized quad function, ok to do passive and active flexion to improve flexion  3. I have discussed return to activity in detail. Flexion improving overall   4.Patient will see us in 3 months  5. All questions were answered and patient should contact us if she  has any questions or concerns in the interim.

## 2020-02-11 ENCOUNTER — CLINICAL SUPPORT (OUTPATIENT)
Dept: REHABILITATION | Facility: HOSPITAL | Age: 60
End: 2020-02-11
Payer: COMMERCIAL

## 2020-02-11 DIAGNOSIS — R53.1 WEAKNESS: ICD-10-CM

## 2020-02-11 DIAGNOSIS — M25.60 RANGE OF MOTION DEFICIT: ICD-10-CM

## 2020-02-11 PROCEDURE — 97110 THERAPEUTIC EXERCISES: CPT

## 2020-02-11 PROCEDURE — 97140 MANUAL THERAPY 1/> REGIONS: CPT

## 2020-02-13 ENCOUNTER — CLINICAL SUPPORT (OUTPATIENT)
Dept: REHABILITATION | Facility: HOSPITAL | Age: 60
End: 2020-02-13
Payer: COMMERCIAL

## 2020-02-13 DIAGNOSIS — M25.60 RANGE OF MOTION DEFICIT: ICD-10-CM

## 2020-02-13 DIAGNOSIS — R53.1 WEAKNESS: ICD-10-CM

## 2020-02-13 PROCEDURE — 97110 THERAPEUTIC EXERCISES: CPT

## 2020-02-13 NOTE — PROGRESS NOTES
Physical Therapy  Note      Name: Marielena Yang [place]  Clinic Number: [id number]    Therapy Diagnosis:   Encounter Diagnoses   Name Primary?    Range of motion deficit     Weakness      Physician: Ran Thomas III, *    Visit Date: 2/13/2020   Physician: Ran Thomas III, *  S82.032A (ICD-10-CM) - Closed displaced transverse fracture of left patella, initial encounter    S82.032A (ICD-10-CM) - Displaced transverse fracture of left patella, initial encounter for closed fracture  Physician Orders: 2    Medical Diagnosis: Eval and treat   Date of Surgery: 9/24/19  Evaluation Date: 10/8/2019  Authorization Period Expiration: 12/31/19  Plan of Care Certification Period: 1/28/20  Visit # / Visits authorized: 7/ 20    Precautions: Weightbearing  POSTOPERATIVE PLAN: Remain PWB for 6 weeks, locked out straight x 4 weeks    Time in 1100  Time out  1200  Tx time 50  Totol tx time 60'      Subjective      Pt reports: no pain this morning.   Response to previous treatment: soreness  Functional change: more normalized gait pattern this AM    Pain: 0/10  Location: left knee      Objective   AAROM L knee flexion 0-0-105    Marielena received therapeutic exercises to develop strength, endurance, ROM, flexibility and core stabilization for 55 minutes including:  Stationary bike 10' recumbent bike   Mini band lateral walking 30'x4  Prone knee stretch 10x10 sec   SL knee bends holding onto rack 30x   Squat on box with right foot under block 3x15  Hip 4 way maching 20lbs abduction and hip extension   Leg press 80lbs DL 3x15   HS curl machine 10lbs SL 3x10   SL squats 45x 0-45 deg   SL balance 3x30 sec   Lunge matrix 20x each direction right leg                  Not today  L SL hip abd  3x15 2lbs  staning hip extension/abduction 30x each with yellow band   Prone knee flexion stretch 0-60 w/ 20 sec holds 5x   Heel slides 0-60 deg        Marielena received the following manual therapy techniques: Joint  mobilizations, Myofacial release and Soft tissue Mobilization were applied to the: left knee for 15 minutes, including: patella medial/lateral mobs, STM, prone hip flexor and quad stretch, tiger tail STM quad and ITB      RICE for L knee for 10' for decreased pain, edema and circulation     Home Exercises Provided and Patient Education Provided     Education provided:   PWB with crutches     Written Home Exercises Provided: no .       Assessment     Pt tolerating tx well.  Pt states that she's feeling good with her rehab, we will begin working on stairs and continue general LE strengthening.  Overall, pt doing well.          Marielena is progressing well towards her goals.   Pt prognosis is Good.     Pt will continue to benefit from skilled outpatient physical therapy to address the deficits listed in the problem list box on initial evaluation, provide pt/family education and to maximize pt's level of independence in the home and community environment.     Pt's spiritual, cultural and educational needs considered and pt agreeable to plan of care and goals.    Anticipated barriers to physical therapy: none     Goals:    In 4-8 weeks, pt. will:  1. Pt will increase ROM to the left knee to 90 deg of flexion,  in order to perform ADLs and IADLs more effectively   2. Decrease overall pain to the left knee to 3-4/10,  on average with daily activities   3. Increase strength to the left LE to 3+/5 grossly throughout,  in order to perform ADLs and IADLs more effectively   4. Improve FOTO intake score to 45 to demonstrate improvement with functional mobility and use  5. Independent with HEP  Long Term GOALS:  In 8-16 weeks, pt. will:  1. Pt will increase ROM to the left knee to WNL,  in order to perform ADLs and IADLs more effectively   2. Decrease overall pain to left knee to 1-2/10,  on average with daily activities   3. Increase strength to the left LE to 4/5,  in order to perform ADLs and IADLs more effectively   4. Improve  FOTO intake score to 70 to demonstrate improvement with functional mobility and use  5. Independent with HEP  6. Return to full recreational and community walking  unrestricted        Plan     Continue therapy as planned and progress accordingly.      [facility] Mckeon, PT, STS

## 2020-02-18 ENCOUNTER — CLINICAL SUPPORT (OUTPATIENT)
Dept: REHABILITATION | Facility: HOSPITAL | Age: 60
End: 2020-02-18
Payer: COMMERCIAL

## 2020-02-18 DIAGNOSIS — R53.1 WEAKNESS: ICD-10-CM

## 2020-02-18 DIAGNOSIS — M25.60 RANGE OF MOTION DEFICIT: ICD-10-CM

## 2020-02-18 PROCEDURE — 97110 THERAPEUTIC EXERCISES: CPT

## 2020-02-18 PROCEDURE — 97140 MANUAL THERAPY 1/> REGIONS: CPT

## 2020-02-18 NOTE — PROGRESS NOTES
Physical Therapy   Note      Name: Marielena Yang [place]  Clinic Number: [id number]    Therapy Diagnosis:   Encounter Diagnoses   Name Primary?    Range of motion deficit     Weakness      Physician: Ran Thomas III, *    Visit Date: 2/18/2020   Physician: Ran Thomas III, *  S82.032A (ICD-10-CM) - Closed displaced transverse fracture of left patella, initial encounter    S82.032A (ICD-10-CM) - Displaced transverse fracture of left patella, initial encounter for closed fracture  Physician Orders: 2    Medical Diagnosis: Eval and treat   Date of Surgery: 9/24/19  Evaluation Date: 10/8/2019  Authorization Period Expiration: 12/31/19  Plan of Care Certification Period: 1/28/20  Visit # / Visits authorized: 7/ 20    Precautions: Weightbearing  POSTOPERATIVE PLAN: Remain PWB for 6 weeks, locked out straight x 4 weeks    Time in 1100  Time out  1200  Tx time 50  Totol tx time 60'      Subjective      Pt reports: feeling very sore from last visit but getting better   Response to previous treatment: soreness  Functional change: knee stiffness noted with gait     Pain: 2/10  Location: left knee      Objective   AAROM L knee flexion 0-0-105    Marielena received therapeutic exercises to develop strength, endurance, ROM, flexibility and core stabilization for 45 minutes including:  Stationary bike 10' recumbent bike   Mini band lateral walking 30'x4  Prone knee stretch 10x10 sec   SL knee bends holding onto rack 30x   Squat on box with right foot under block 3x15 w/ 15lbs KB   Leg press SL 60'bs 3x10  Bridge to bridge on ball to HS curls on ball 20x each exercise   SL balance 3x30 sec   Lunge matrix 20x each direction right leg                  Not today  L SL hip abd  3x15 2lbs  staning hip extension/abduction 30x each with yellow band   Prone knee flexion stretch 0-60 w/ 20 sec holds 5x   Heel slides 0-60 deg        Marielena received the following manual therapy techniques: Joint  mobilizations, Myofacial release and Soft tissue Mobilization were applied to the: left knee for 10 minutes, including:   patella medial/lateral mobs,   STM left knee in flexion      RICE for L knee for 10' for decreased pain, edema and circulation     Home Exercises Provided and Patient Education Provided     Education provided:   PWB with crutches     Written Home Exercises Provided: no .       Assessment     Pt tolerating tx well.  She was sore today so we had planned on working on stairs but her DOMS has forced us to push back that training.   She received STM to the left knee and scar including MWM at edge of table.   Overall, pt doing well.          Marielena is progressing well towards her goals.   Pt prognosis is Good.     Pt will continue to benefit from skilled outpatient physical therapy to address the deficits listed in the problem list box on initial evaluation, provide pt/family education and to maximize pt's level of independence in the home and community environment.     Pt's spiritual, cultural and educational needs considered and pt agreeable to plan of care and goals.    Anticipated barriers to physical therapy: none     Goals:    In 4-8 weeks, pt. will:  1. Pt will increase ROM to the left knee to 90 deg of flexion,  in order to perform ADLs and IADLs more effectively   2. Decrease overall pain to the left knee to 3-4/10,  on average with daily activities   3. Increase strength to the left LE to 3+/5 grossly throughout,  in order to perform ADLs and IADLs more effectively   4. Improve FOTO intake score to 45 to demonstrate improvement with functional mobility and use  5. Independent with HEP  Long Term GOALS:  In 8-16 weeks, pt. will:  1. Pt will increase ROM to the left knee to WNL,  in order to perform ADLs and IADLs more effectively   2. Decrease overall pain to left knee to 1-2/10,  on average with daily activities   3. Increase strength to the left LE to 4/5,  in order to perform ADLs and  IADLs more effectively   4. Improve FOTO intake score to 70 to demonstrate improvement with functional mobility and use  5. Independent with HEP  6. Return to full recreational and community walking  unrestricted        Plan     Continue therapy as planned and progress accordingly.      [facility] Mckeon, PT, STS

## 2020-02-20 ENCOUNTER — CLINICAL SUPPORT (OUTPATIENT)
Dept: REHABILITATION | Facility: HOSPITAL | Age: 60
End: 2020-02-20
Attending: PHYSICIAN ASSISTANT
Payer: COMMERCIAL

## 2020-02-20 DIAGNOSIS — R53.1 WEAKNESS: ICD-10-CM

## 2020-02-20 DIAGNOSIS — M25.60 RANGE OF MOTION DEFICIT: ICD-10-CM

## 2020-02-20 PROCEDURE — 97110 THERAPEUTIC EXERCISES: CPT

## 2020-02-20 PROCEDURE — 97140 MANUAL THERAPY 1/> REGIONS: CPT

## 2020-02-20 NOTE — PROGRESS NOTES
Physical Therapy   Note      Name: Marielena Yang [place]  Clinic Number: [id number]    Therapy Diagnosis:   Encounter Diagnoses   Name Primary?    Range of motion deficit     Weakness      Physician: Ran Thomas III, *    Visit Date: 2/20/2020   Physician: Ran Thomas III, *  S82.032A (ICD-10-CM) - Closed displaced transverse fracture of left patella, initial encounter    S82.032A (ICD-10-CM) - Displaced transverse fracture of left patella, initial encounter for closed fracture  Physician Orders: 2    Medical Diagnosis: Eval and treat   Date of Surgery: 9/24/19  Evaluation Date: 10/8/2019  Authorization Period Expiration: 12/31/19  Plan of Care Certification Period: 1/28/20  Visit # / Visits authorized: 8/ 20    Precautions: Weightbearing  POSTOPERATIVE PLAN: Remain PWB for 6 weeks, locked out straight x 4 weeks    Time in 800  Time out  900  Tx time 50  Totol tx time 60'      Subjective      Pt reports: feeling very sore from last visit but getting better, some soreness remains in the knee.   Response to previous treatment: soreness  Functional change: knee stiffness noted with gait     Pain: 2/10  Location: left knee      Objective   AAROM L knee flexion 0-0-105    Marielena received therapeutic exercises to develop strength, endurance, ROM, flexibility and core stabilization for 45 minutes including:  Stationary bike 10' recumbent bike   Mini band lateral walking 30'x4  Prone knee stretch 10x10 sec   Leg press SL 60'bs 3x10  Bridge to bridge on ball to HS curls on ball 20x each exercise   Hip 4 ways machine 2nd weight 3x10 each direction   Hamstring curl machine 10lbs 3x10   Knee extension machine 3x10 5lbs                    Not today  L SL hip abd  3x15 2lbs  staning hip extension/abduction 30x each with yellow band   Prone knee flexion stretch 0-60 w/ 20 sec holds 5x   Heel slides 0-60 deg        Marielena received the following manual therapy techniques: Joint mobilizations,  Myofacial release and Soft tissue Mobilization were applied to the: left knee for 10 minutes, including:   patella medial/lateral mobs,   STM left knee in flexion      RICE for L knee for 10' for decreased pain, edema and circulation     Home Exercises Provided and Patient Education Provided     Education provided:   PWB with crutches     Written Home Exercises Provided: no .       Assessment     Pt tolerating tx well.  Resume normal exercise load today and continued working on ROM.  Single leg balance exercises also performed and pt is improving with this. Look to work in stairs next week pending symptoms.    She received STM to the left knee and scar including MWM at edge of table.   Overall, pt doing well.          Marielena is progressing well towards her goals.   Pt prognosis is Good.     Pt will continue to benefit from skilled outpatient physical therapy to address the deficits listed in the problem list box on initial evaluation, provide pt/family education and to maximize pt's level of independence in the home and community environment.     Pt's spiritual, cultural and educational needs considered and pt agreeable to plan of care and goals.    Anticipated barriers to physical therapy: none     Goals:    In 4-8 weeks, pt. will:  1. Pt will increase ROM to the left knee to 90 deg of flexion,  in order to perform ADLs and IADLs more effectively   2. Decrease overall pain to the left knee to 3-4/10,  on average with daily activities   3. Increase strength to the left LE to 3+/5 grossly throughout,  in order to perform ADLs and IADLs more effectively   4. Improve FOTO intake score to 45 to demonstrate improvement with functional mobility and use  5. Independent with HEP  Long Term GOALS:  In 8-16 weeks, pt. will:  1. Pt will increase ROM to the left knee to WNL,  in order to perform ADLs and IADLs more effectively   2. Decrease overall pain to left knee to 1-2/10,  on average with daily activities   3. Increase  strength to the left LE to 4/5,  in order to perform ADLs and IADLs more effectively   4. Improve FOTO intake score to 70 to demonstrate improvement with functional mobility and use  5. Independent with HEP  6. Return to full recreational and community walking  unrestricted        Plan     Continue therapy as planned and progress accordingly.      [place] Mckeon, PT, STS

## 2020-02-23 DIAGNOSIS — G47.00 INSOMNIA, UNSPECIFIED TYPE: ICD-10-CM

## 2020-02-23 RX ORDER — HYDROXYZINE HYDROCHLORIDE 25 MG/1
TABLET, FILM COATED ORAL
Qty: 30 TABLET | Refills: 1 | Status: SHIPPED | OUTPATIENT
Start: 2020-02-23 | End: 2020-03-19

## 2020-02-26 ENCOUNTER — CLINICAL SUPPORT (OUTPATIENT)
Dept: REHABILITATION | Facility: HOSPITAL | Age: 60
End: 2020-02-26
Payer: COMMERCIAL

## 2020-02-26 DIAGNOSIS — M25.60 RANGE OF MOTION DEFICIT: ICD-10-CM

## 2020-02-26 DIAGNOSIS — R53.1 WEAKNESS: ICD-10-CM

## 2020-02-26 PROCEDURE — 97140 MANUAL THERAPY 1/> REGIONS: CPT

## 2020-02-26 PROCEDURE — 97110 THERAPEUTIC EXERCISES: CPT

## 2020-02-26 NOTE — PROGRESS NOTES
Physical Therapy  Note      Name: Marielena Yang [place]  Clinic Number: [id number]    Therapy Diagnosis:   Encounter Diagnoses   Name Primary?    Range of motion deficit     Weakness      Physician: Ran Thomas III, *    Visit Date: 2/11/2020   Physician: Ran Thomas III, *  S82.032A (ICD-10-CM) - Closed displaced transverse fracture of left patella, initial encounter    S82.032A (ICD-10-CM) - Displaced transverse fracture of left patella, initial encounter for closed fracture  Physician Orders: 2    Medical Diagnosis: Eval and treat   Date of Surgery: 9/24/19  Evaluation Date: 10/8/2019  Authorization Period Expiration: 12/31/19  Plan of Care Certification Period: 1/28/20  Visit # / Visits authorized: 8/ 20    Precautions: Weightbearing  POSTOPERATIVE PLAN: Remain PWB for 6 weeks, locked out straight x 4 weeks    Time in 1100  Time out  1200  Tx time 50  Totol tx time 60'      Subjective      Pt reports: no pain this morning.   Response to previous treatment: soreness  Functional change: more normalized gait pattern this AM    Pain: 0/10  Location: left knee      Objective   AAROM L knee flexion 0-0-105    Marielena received therapeutic exercises to develop strength, endurance, ROM, flexibility and core stabilization for 43 minutes including:  Stationary bike 10' recumbent bike   Mini band lateral walking 30'x4  Prone knee stretch 10x10 sec   SL knee bends holding onto rack 30x   Lateral step downs 30x 2 inch box   Hip 4 way maching 20lbs abduction and hip extension   Leg press 80lbs DL 3x15   HS curl machine 10lbs SL 3x10   Lateral lunge BW 30x bilateral   Step back lunge, 1/2 30x bilateral                  Not today  L SL hip abd  3x15 2lbs  staning hip extension/abduction 30x each with yellow band   Prone knee flexion stretch 0-60 w/ 20 sec holds 5x   Heel slides 0-60 deg        Marielena received the following manual therapy techniques: Joint mobilizations, Myofacial release  and Soft tissue Mobilization were applied to the: left knee for 15 minutes, including: patella medial/lateral mobs, STM, prone hip flexor and quad stretch, tiger tail STM quad and ITB      RICE for L knee for 10' for decreased pain, edema and circulation     Home Exercises Provided and Patient Education Provided     Education provided:   PWB with crutches     Written Home Exercises Provided: no .       Assessment     Pt tolerating tx well.  Continued with exercise progress and added lunge isolated exercises today vs matrix.  Pt remains guarded with full body weight bearing on the LLE, but overall this is improving.  Again, we look to work stairs possibly over the next week pending symptoms.        Marielena is progressing well towards her goals.   Pt prognosis is Good.     Pt will continue to benefit from skilled outpatient physical therapy to address the deficits listed in the problem list box on initial evaluation, provide pt/family education and to maximize pt's level of independence in the home and community environment.     Pt's spiritual, cultural and educational needs considered and pt agreeable to plan of care and goals.    Anticipated barriers to physical therapy: none     Goals:    In 4-8 weeks, pt. will:  1. Pt will increase ROM to the left knee to 90 deg of flexion,  in order to perform ADLs and IADLs more effectively   2. Decrease overall pain to the left knee to 3-4/10,  on average with daily activities   3. Increase strength to the left LE to 3+/5 grossly throughout,  in order to perform ADLs and IADLs more effectively   4. Improve FOTO intake score to 45 to demonstrate improvement with functional mobility and use  5. Independent with HEP  Long Term GOALS:  In 8-16 weeks, pt. will:  1. Pt will increase ROM to the left knee to WNL,  in order to perform ADLs and IADLs more effectively   2. Decrease overall pain to left knee to 1-2/10,  on average with daily activities   3. Increase strength to the left LE  to 4/5,  in order to perform ADLs and IADLs more effectively   4. Improve FOTO intake score to 70 to demonstrate improvement with functional mobility and use  5. Independent with HEP  6. Return to full recreational and community walking  unrestricted        Plan     Continue therapy as planned and progress accordingly.      [place] Mckeon, PT, STS

## 2020-02-27 ENCOUNTER — CLINICAL SUPPORT (OUTPATIENT)
Dept: REHABILITATION | Facility: HOSPITAL | Age: 60
End: 2020-02-27
Payer: COMMERCIAL

## 2020-02-27 DIAGNOSIS — M25.60 RANGE OF MOTION DEFICIT: ICD-10-CM

## 2020-02-27 DIAGNOSIS — R53.1 WEAKNESS: ICD-10-CM

## 2020-02-27 PROCEDURE — 97110 THERAPEUTIC EXERCISES: CPT

## 2020-02-27 PROCEDURE — 97140 MANUAL THERAPY 1/> REGIONS: CPT

## 2020-02-27 NOTE — PROGRESS NOTES
Physical Therapy   Note      Name: Marielena Yang [place]  Clinic Number: [id number]    Therapy Diagnosis:   Encounter Diagnoses   Name Primary?    Range of motion deficit     Weakness      Physician: Ran Thomas III, *    Visit Date: 2/26/2020   Physician: Ran Thomas III, *  S82.032A (ICD-10-CM) - Closed displaced transverse fracture of left patella, initial encounter    S82.032A (ICD-10-CM) - Displaced transverse fracture of left patella, initial encounter for closed fracture  Physician Orders: 2    Medical Diagnosis: Eval and treat   Date of Surgery: 9/24/19  Evaluation Date: 10/8/2019  Authorization Period Expiration: 12/31/19  Plan of Care Certification Period: 1/28/20  Visit # / Visits authorized: 8/ 20    Precautions: Weightbearing  POSTOPERATIVE PLAN: Remain PWB for 6 weeks, locked out straight x 4 weeks    Time in 900  Time out  1000am  Tx time 50  Totol tx time 60'      Subjective      Pt reports: feeling very sore from last visit but getting better, some soreness remains in the knee.   Response to previous treatment: soreness  Functional change: knee stiffness noted with gait     Pain: 2/10  Location: left knee      Objective   AAROM L knee flexion 0-0-105    Marielena received therapeutic exercises to develop strength, endurance, ROM, flexibility and core stabilization for 45 minutes including:  Eliptical 5 min  Mini band lateral walking 30'x4  Prone knee stretch 10x10 sec   Leg press SL 60'bs 3x10  Bridge to bridge on ball to HS curls on ball 20x each exercise   Hip 4 ways machine 2nd weight 3x10 each direction   Hamstring curl machine 10lbs 3x10   Knee extension machine 3x10 5lbs   Lateral lunge 30x bilateral   Standing calf raise 45x                      Not today  L SL hip abd  3x15 2lbs  staning hip extension/abduction 30x each with yellow band   Prone knee flexion stretch 0-60 w/ 20 sec holds 5x   Heel slides 0-60 deg        Marielena received the following manual  therapy techniques: Joint mobilizations, Myofacial release and Soft tissue Mobilization were applied to the: left knee for 10 minutes, including:   patella medial/lateral mobs,   STM left knee in flexion      RICE for L knee for 10' for decreased pain, edema and circulation     Home Exercises Provided and Patient Education Provided     Education provided:   PWB with crutches     Written Home Exercises Provided: no .       Assessment     Pt tolerating tx well.  Overall, pt did well today.  She noted fatigue with elliptical machine today.  Pt also completed some light stair work and continued with general strengthening.  We will continue PT and I will update her POC tomorrow.          Marielena is progressing well towards her goals.   Pt prognosis is Good.     Pt will continue to benefit from skilled outpatient physical therapy to address the deficits listed in the problem list box on initial evaluation, provide pt/family education and to maximize pt's level of independence in the home and community environment.     Pt's spiritual, cultural and educational needs considered and pt agreeable to plan of care and goals.    Anticipated barriers to physical therapy: none     Goals:    In 4-8 weeks, pt. will:  1. Pt will increase ROM to the left knee to 90 deg of flexion,  in order to perform ADLs and IADLs more effectively   2. Decrease overall pain to the left knee to 3-4/10,  on average with daily activities   3. Increase strength to the left LE to 3+/5 grossly throughout,  in order to perform ADLs and IADLs more effectively   4. Improve FOTO intake score to 45 to demonstrate improvement with functional mobility and use  5. Independent with HEP  Long Term GOALS:  In 8-16 weeks, pt. will:  1. Pt will increase ROM to the left knee to WNL,  in order to perform ADLs and IADLs more effectively   2. Decrease overall pain to left knee to 1-2/10,  on average with daily activities   3. Increase strength to the left LE to 4/5,  in  order to perform ADLs and IADLs more effectively   4. Improve FOTO intake score to 70 to demonstrate improvement with functional mobility and use  5. Independent with HEP  6. Return to full recreational and community walking  unrestricted        Plan     Continue therapy as planned and progress accordingly.      Hamilton Mckeon, PT, STS

## 2020-02-27 NOTE — PROGRESS NOTES
Physical Therapy Reassessment  Note      Name: Marielena Yang [place]  Clinic Number: [id number]    Therapy Diagnosis:   Encounter Diagnoses   Name Primary?    Range of motion deficit     Weakness      Physician: Ran Thomas III, *    Visit Date: 2/27/2020   Physician: Ran Thomas III, *  S82.032A (ICD-10-CM) - Closed displaced transverse fracture of left patella, initial encounter    S82.032A (ICD-10-CM) - Displaced transverse fracture of left patella, initial encounter for closed fracture  Physician Orders: 2    Medical Diagnosis: Eval and treat   Date of Surgery: 9/24/19  Evaluation Date: 10/8/2019  Authorization Period Expiration: 12/31/19  Plan of Care Certification Period: 4/28/2020  Visit # / Visits authorized: 9/ 20    Precautions: Weightbearing  POSTOPERATIVE PLAN: Remain PWB for 6 weeks, locked out straight x 4 weeks    Time in 800  Time out  900  Tx time 50  Totol tx time 60'      Subjective      Pt reports: feeling very sore from last visit but getting better, some soreness remains in the knee.   Response to previous treatment: soreness  Functional change: knee stiffness noted with gait     Pain: 2/10  Location: left knee      Objective   AAROM L knee flexion 0-0-115    Marielena received therapeutic exercises to develop strength, endurance, ROM, flexibility and core stabilization for 45 minutes including:  Stationary bike 10' recumbent bike   Mini band lateral walking 30'x4  Prone knee stretch 10x10 sec   Leg press SL 80'bs 3x10  Bridge to bridge on ball to HS curls on ball 20x each exercise   Hip 4 ways machine 2nd weight 3x10 each direction   Hamstring curl machine 10lbs 3x10   Knee extension machine 3x10 5lbs   Wall sits 4x20 sec   Clam shells 3x15   Stair step ups/downs 60 sec                  Not today  L SL hip abd  3x15 2lbs  staning hip extension/abduction 30x each with yellow band   Prone knee flexion stretch 0-60 w/ 20 sec holds 5x   Heel slides 0-60 deg         Marielena received the following manual therapy techniques: Joint mobilizations, Myofacial release and Soft tissue Mobilization were applied to the: left knee for 10 minutes, including:   patella medial/lateral mobs,   STM left knee in flexion      RICE for L knee for 10' for decreased pain, edema and circulation     Home Exercises Provided and Patient Education Provided     Education provided:   PWB with crutches     Written Home Exercises Provided: no .       Assessment     Pt tolerating tx well.  Pt will be going on vacation for a month and so we went over her HEP.  Pt was able to complete without any issues and was given bands for her HEP.  Pt states that she is feeling stronger and we did again work on stairs in the clinic.  She is doing quite well and notes feeling about 75% at this time.  We will continue PT 2-3/week x 8 weeks upon her return from vacation.          Marielena is progressing well towards her goals.   Pt prognosis is Good.     Pt will continue to benefit from skilled outpatient physical therapy to address the deficits listed in the problem list box on initial evaluation, provide pt/family education and to maximize pt's level of independence in the home and community environment.     Pt's spiritual, cultural and educational needs considered and pt agreeable to plan of care and goals.    Anticipated barriers to physical therapy: none     Goals:    In 4-8 weeks, pt. will:  1. Pt will increase ROM to the left knee to 90 deg of flexion,  in order to perform ADLs and IADLs more effectively   2. Decrease overall pain to the left knee to 3-4/10,  on average with daily activities   3. Increase strength to the left LE to 3+/5 grossly throughout,  in order to perform ADLs and IADLs more effectively   4. Improve FOTO intake score to 45 to demonstrate improvement with functional mobility and use  5. Independent with HEP  Long Term GOALS:  In 8-16 weeks, pt. will:  1. Pt will increase ROM to the left knee to  WNL,  in order to perform ADLs and IADLs more effectively   2. Decrease overall pain to left knee to 1-2/10,  on average with daily activities   3. Increase strength to the left LE to 4/5,  in order to perform ADLs and IADLs more effectively   4. Improve FOTO intake score to 70 to demonstrate improvement with functional mobility and use  5. Independent with HEP  6. Return to full recreational and community walking  unrestricted        Plan     Pt to go on vacation x one month.  Upon return she will resume therapy x 8 weeks.      Hamilton Mckeon, PT, STS

## 2020-03-19 DIAGNOSIS — G47.00 INSOMNIA, UNSPECIFIED TYPE: ICD-10-CM

## 2020-03-19 RX ORDER — HYDROXYZINE HYDROCHLORIDE 25 MG/1
TABLET, FILM COATED ORAL
Qty: 30 TABLET | Refills: 1 | Status: SHIPPED | OUTPATIENT
Start: 2020-03-19 | End: 2020-04-12 | Stop reason: SDUPTHER

## 2020-03-26 ENCOUNTER — PATIENT MESSAGE (OUTPATIENT)
Dept: REHABILITATION | Facility: HOSPITAL | Age: 60
End: 2020-03-26

## 2020-04-12 DIAGNOSIS — G47.00 INSOMNIA, UNSPECIFIED TYPE: ICD-10-CM

## 2020-04-12 RX ORDER — HYDROXYZINE HYDROCHLORIDE 25 MG/1
TABLET, FILM COATED ORAL
Qty: 30 TABLET | Refills: 1 | Status: SHIPPED | OUTPATIENT
Start: 2020-04-12 | End: 2020-05-04

## 2020-04-30 ENCOUNTER — PATIENT MESSAGE (OUTPATIENT)
Dept: INTERNAL MEDICINE | Facility: CLINIC | Age: 60
End: 2020-04-30

## 2020-05-04 ENCOUNTER — OFFICE VISIT (OUTPATIENT)
Dept: INTERNAL MEDICINE | Facility: CLINIC | Age: 60
End: 2020-05-04
Payer: COMMERCIAL

## 2020-05-04 DIAGNOSIS — Z00.00 ROUTINE PHYSICAL EXAMINATION: ICD-10-CM

## 2020-05-04 DIAGNOSIS — I10 ESSENTIAL HYPERTENSION: Primary | ICD-10-CM

## 2020-05-04 DIAGNOSIS — F41.9 ANXIETY AND DEPRESSION: ICD-10-CM

## 2020-05-04 DIAGNOSIS — E78.5 HYPERLIPIDEMIA, UNSPECIFIED HYPERLIPIDEMIA TYPE: ICD-10-CM

## 2020-05-04 DIAGNOSIS — G47.00 INSOMNIA, UNSPECIFIED TYPE: ICD-10-CM

## 2020-05-04 DIAGNOSIS — F32.A ANXIETY AND DEPRESSION: ICD-10-CM

## 2020-05-04 DIAGNOSIS — E55.9 VITAMIN D DEFICIENCY: ICD-10-CM

## 2020-05-04 PROCEDURE — 99214 PR OFFICE/OUTPT VISIT, EST, LEVL IV, 30-39 MIN: ICD-10-PCS | Mod: 95,,, | Performed by: INTERNAL MEDICINE

## 2020-05-04 PROCEDURE — 99214 OFFICE O/P EST MOD 30 MIN: CPT | Mod: 95,,, | Performed by: INTERNAL MEDICINE

## 2020-05-04 RX ORDER — SIMVASTATIN 40 MG/1
40 TABLET, FILM COATED ORAL DAILY
Qty: 90 TABLET | Refills: 4 | Status: SHIPPED | OUTPATIENT
Start: 2020-05-04 | End: 2021-06-29

## 2020-05-04 RX ORDER — HYDROXYZINE HYDROCHLORIDE 50 MG/1
50 TABLET, FILM COATED ORAL NIGHTLY PRN
Qty: 30 TABLET | Refills: 12 | Status: SHIPPED | OUTPATIENT
Start: 2020-05-04 | End: 2020-05-18 | Stop reason: SDUPTHER

## 2020-05-04 RX ORDER — LORAZEPAM 1 MG/1
TABLET ORAL
Qty: 30 TABLET | Refills: 5 | Status: SHIPPED | OUTPATIENT
Start: 2020-05-04 | End: 2020-11-12 | Stop reason: SDUPTHER

## 2020-05-04 RX ORDER — CITALOPRAM 40 MG/1
40 TABLET, FILM COATED ORAL DAILY
Qty: 90 TABLET | Refills: 4 | Status: SHIPPED | OUTPATIENT
Start: 2020-05-04 | End: 2021-06-29

## 2020-05-04 NOTE — PROGRESS NOTES
Subjective:       Patient ID: Marielena Tracy is a 59 y.o. female.    Chief Complaint: Follow-up and Anxiety    The patient location is: Home  The chief complaint leading to consultation is: Follow up medication  Visit type: audiovisual  Total time spent with patient: 15 minutes  Each patient to whom he or she provides medical services by telemedicine is:  (1) informed of the relationship between the physician and patient and the respective role of any other health care provider with respect to management of the patient; and (2) notified that he or she may decline to receive medical services by telemedicine and may withdraw from such care at any time.    Notes:  HPI:  Patient seen via video visit-she is well-known to me.  Overall doing well.  She thinks she has lost a few lb.  She is no longer exercising relative to her kneecap surgery.  She does not think hydroxyzine is helping her sleep as well as she had hoped but she would consider raising the dose.  Lorazepam is still once a day mostly.   reviewed.  No GI or  complaints.  She is due for mammogram and labs and we will assist her with those.  We stressed the importance of trying to stay active, eating a healthy diet, and maintaining diet and exercise to reduce weight.  She is due for colon screening wall but will consider that a little later in the year.      Review of Systems   Constitutional: Negative for appetite change, chills and fever.   HENT: Negative for nosebleeds, sinus pain and sore throat.    Eyes: Negative for visual disturbance.   Respiratory: Negative for cough, shortness of breath and wheezing.    Cardiovascular: Negative for chest pain and leg swelling.   Gastrointestinal: Negative for abdominal pain, constipation and diarrhea.   Genitourinary: Negative for difficulty urinating and hematuria.   Musculoskeletal: Positive for arthralgias. Negative for neck pain and neck stiffness.   Skin: Negative for pallor and rash.   Neurological:  Negative for headaches.   Psychiatric/Behavioral: Positive for sleep disturbance. Negative for dysphoric mood and suicidal ideas. The patient is nervous/anxious.        Objective:      Physical Exam   Constitutional: She is oriented to person, place, and time. She appears well-developed and well-nourished.   HENT:   Head: Normocephalic and atraumatic.   Pulmonary/Chest: No respiratory distress.   Musculoskeletal: She exhibits no tenderness (No tenderness to range of motion or patient's palpation of the knee).   Neurological: She is alert and oriented to person, place, and time.   Psychiatric: She has a normal mood and affect. Her behavior is normal.       Assessment:       1. Essential hypertension    2. Insomnia, unspecified type    3. Hyperlipidemia, unspecified hyperlipidemia type    4. Anxiety and depression    5. Routine physical examination    6. Vitamin D deficiency        Plan:       Marielena was seen today for follow-up and anxiety.    Diagnoses and all orders for this visit:    Essential hypertension  -     Lipid Panel; Future  -     CBC auto differential; Future  -     Comprehensive metabolic panel; Future  -     Vitamin D; Future  -     TSH; Future    Insomnia, unspecified type  -     hydroxyzine HCL (ATARAX) 50 MG tablet; Take 1 tablet (50 mg total) by mouth nightly as needed (insomnia).  -     Lipid Panel; Future  -     CBC auto differential; Future  -     Comprehensive metabolic panel; Future  -     Vitamin D; Future  -     TSH; Future    Hyperlipidemia, unspecified hyperlipidemia type  -     Lipid Panel; Future  -     CBC auto differential; Future  -     Comprehensive metabolic panel; Future  -     Vitamin D; Future  -     TSH; Future    Anxiety and depression  -     Lipid Panel; Future  -     CBC auto differential; Future  -     Comprehensive metabolic panel; Future  -     Vitamin D; Future  -     TSH; Future    Routine physical examination  -     Lipid Panel; Future  -     CBC auto differential;  Future  -     Comprehensive metabolic panel; Future  -     Vitamin D; Future  -     TSH; Future    Vitamin D deficiency  -     Vitamin D; Future    Other orders  -     LORazepam (ATIVAN) 1 MG tablet; TAKE 1 TABLET (1 MG TOTAL) BY MOUTH EVERY EVENING.  -     simvastatin (ZOCOR) 40 MG tablet; Take 1 tablet (40 mg total) by mouth once daily.  -     citalopram (CELEXA) 40 MG tablet; Take 1 tablet (40 mg total) by mouth once daily.        labs when patient is able or in 6 months.  Prescriptions renewed.   reviewed.

## 2020-05-08 DIAGNOSIS — G47.00 INSOMNIA, UNSPECIFIED TYPE: ICD-10-CM

## 2020-05-08 RX ORDER — HYDROXYZINE HYDROCHLORIDE 25 MG/1
TABLET, FILM COATED ORAL
Qty: 30 TABLET | Refills: 1 | Status: SHIPPED | OUTPATIENT
Start: 2020-05-08 | End: 2020-10-20 | Stop reason: SDUPTHER

## 2020-05-18 DIAGNOSIS — G47.00 INSOMNIA, UNSPECIFIED TYPE: ICD-10-CM

## 2020-05-18 RX ORDER — HYDROXYZINE HYDROCHLORIDE 50 MG/1
50 TABLET, FILM COATED ORAL NIGHTLY PRN
Qty: 30 TABLET | Refills: 12 | Status: SHIPPED | OUTPATIENT
Start: 2020-05-18 | End: 2021-06-30

## 2020-05-21 DIAGNOSIS — G47.00 INSOMNIA, UNSPECIFIED TYPE: ICD-10-CM

## 2020-05-21 RX ORDER — HYDROXYZINE HYDROCHLORIDE 25 MG/1
TABLET, FILM COATED ORAL
Qty: 30 TABLET | Refills: 1 | OUTPATIENT
Start: 2020-05-21

## 2020-06-01 ENCOUNTER — PATIENT OUTREACH (OUTPATIENT)
Dept: ADMINISTRATIVE | Facility: OTHER | Age: 60
End: 2020-06-01

## 2020-06-01 ENCOUNTER — OFFICE VISIT (OUTPATIENT)
Dept: SPORTS MEDICINE | Facility: CLINIC | Age: 60
End: 2020-06-01
Payer: COMMERCIAL

## 2020-06-01 VITALS
BODY MASS INDEX: 33.29 KG/M2 | HEART RATE: 79 BPM | WEIGHT: 195 LBS | HEIGHT: 64 IN | DIASTOLIC BLOOD PRESSURE: 92 MMHG | SYSTOLIC BLOOD PRESSURE: 142 MMHG

## 2020-06-01 DIAGNOSIS — S86.812D PATELLAR TENDON RUPTURE, LEFT, SUBSEQUENT ENCOUNTER: Primary | ICD-10-CM

## 2020-06-01 PROCEDURE — 3077F SYST BP >= 140 MM HG: CPT | Mod: CPTII,S$GLB,, | Performed by: ORTHOPAEDIC SURGERY

## 2020-06-01 PROCEDURE — 3080F DIAST BP >= 90 MM HG: CPT | Mod: CPTII,S$GLB,, | Performed by: ORTHOPAEDIC SURGERY

## 2020-06-01 PROCEDURE — 3008F PR BODY MASS INDEX (BMI) DOCUMENTED: ICD-10-PCS | Mod: CPTII,S$GLB,, | Performed by: ORTHOPAEDIC SURGERY

## 2020-06-01 PROCEDURE — 3008F BODY MASS INDEX DOCD: CPT | Mod: CPTII,S$GLB,, | Performed by: ORTHOPAEDIC SURGERY

## 2020-06-01 PROCEDURE — 3077F PR MOST RECENT SYSTOLIC BLOOD PRESSURE >= 140 MM HG: ICD-10-PCS | Mod: CPTII,S$GLB,, | Performed by: ORTHOPAEDIC SURGERY

## 2020-06-01 PROCEDURE — 3080F PR MOST RECENT DIASTOLIC BLOOD PRESSURE >= 90 MM HG: ICD-10-PCS | Mod: CPTII,S$GLB,, | Performed by: ORTHOPAEDIC SURGERY

## 2020-06-01 PROCEDURE — 99999 PR PBB SHADOW E&M-EST. PATIENT-LVL IV: CPT | Mod: PBBFAC,,, | Performed by: ORTHOPAEDIC SURGERY

## 2020-06-01 PROCEDURE — 99214 OFFICE O/P EST MOD 30 MIN: CPT | Mod: S$GLB,,, | Performed by: ORTHOPAEDIC SURGERY

## 2020-06-01 PROCEDURE — 99999 PR PBB SHADOW E&M-EST. PATIENT-LVL IV: ICD-10-PCS | Mod: PBBFAC,,, | Performed by: ORTHOPAEDIC SURGERY

## 2020-06-01 PROCEDURE — 99214 PR OFFICE/OUTPT VISIT, EST, LEVL IV, 30-39 MIN: ICD-10-PCS | Mod: S$GLB,,, | Performed by: ORTHOPAEDIC SURGERY

## 2020-06-01 NOTE — PROGRESS NOTES
Care Everywhere: updated  Immunization: updated  Health Maintenance: updated  Media Review: reviewed for possible outside mammogram and colon cancer report  Legacy Review: n/a  Order placed: n/a  Upcoming appts:n/a

## 2020-06-01 NOTE — PROGRESS NOTES
"CC: Left knee pain    HISTORY OF PRESENT ILLNESS:   Pt is here today for follow up of knee surgery.      Patient was attending physical therapy at the [facility] location, working with Hamilton PACKER. She has not been to physical therapy due to the COVID 19 pandemic  She was provided a HEP that she has been performing, she has also been using ankle weights     She notes that she is doing well and that her knee will get fatigued at the end of the day      DATE OF PROCEDURE: 9/24/2019   OPERATION:   1.  Partial inferior pole patellectomy of the left patella with patellar tendon advancement/reconstruction CPT 72375 - 22 modifier        Review of Systems   Constitution: Negative. Negative for chills, fever and night sweats.   HENT: Negative for congestion and headaches.    Eyes: Negative for blurred vision, left vision loss and right vision loss.   Cardiovascular: Negative for chest pain and syncope.   Respiratory: Negative for cough and shortness of breath.    Endocrine: Negative for polydipsia, polyphagia and polyuria.   Hematologic/Lymphatic: Negative for bleeding problem. Does not bruise/bleed easily.   Skin: Negative for dry skin, itching and rash.   Musculoskeletal: Negative for falls and muscle weakness.   Gastrointestinal: Negative for abdominal pain and bowel incontinence.   Genitourinary: Negative for bladder incontinence and nocturia.   Neurological: Negative for disturbances in coordination, loss of balance and seizures.   Psychiatric/Behavioral: Negative for depression. The patient does not have insomnia.    Allergic/Immunologic: Negative for hives and persistent infections.         PAST MEDICAL HISTORY:   Past Medical History:   Diagnosis Date    Anxiety and depression 1/29/2014    Colon polyps     Glaucoma     Hyperlipidemia     Hypertension     pt states " i don't have high blood pressure"    Keloid cicatrix     Obesity (BMI 30.0-34.9)      PAST SURGICAL HISTORY:   Past Surgical History:   Procedure " Laterality Date     SECTION      x2    COLONOSCOPY N/A 2016    Procedure: COLONOSCOPY;  Surgeon: SHANNON Nj MD;  Location: [facility] ENDO (4TH FLR);  Service: Endoscopy;  Laterality: N/A;    KNEE SURGERY Left 2019    PATELLAR TENDON REPAIR Left 2019    Procedure: REPAIR/RECONSTRUCTION TENDON, PATELLAR;  Surgeon: Danielle Cloud MD;  Location: [facility] OR;  Service: Orthopedics;  Laterality: Left;     FAMILY HISTORY:   Family History   Problem Relation Age of Onset    Dementia Father     Breast cancer Sister     Cancer Paternal Grandmother     Cancer Brother     Glaucoma Maternal Uncle     Colon cancer Neg Hx     Ovarian cancer Neg Hx     Blindness Neg Hx     Macular degeneration Neg Hx     Retinal detachment Neg Hx      SOCIAL HISTORY:   Social History     Socioeconomic History    Marital status:      Spouse name: Not on file    Number of children: Not on file    Years of education: Not on file    Highest education level: Not on file   Occupational History    Occupation: Manager     Employer: [employer]   Social Needs    Financial resource strain: Not on file    Food insecurity:     Worry: Not on file     Inability: Not on file    Transportation needs:     Medical: No     Non-medical: No   Tobacco Use    Smoking status: Former Smoker    Smokeless tobacco: Never Used   Substance and Sexual Activity    Alcohol use: Yes     Frequency: 2-3 times a week     Drinks per session: 1 or 2     Comment: few times a week     Drug use: No    Sexual activity: Yes     Partners: Male     Birth control/protection: Post-menopausal   Lifestyle    Physical activity:     Days per week: Not on file     Minutes per session: Not on file    Stress: Only a little   Relationships    Social connections:     Talks on phone: Twice a week     Gets together: Once a week     Attends Anabaptist service: Not on file     Active member of club or organization: Not on file     Attends meetings of  "clubs or organizations: Not on file     Relationship status:    Other Topics Concern    Are you pregnant or think you may be? No    Breast-feeding No   Social History Narrative    Not on file       MEDICATIONS:   Current Outpatient Medications:     ascorbic acid, vitamin C, (VITAMIN C) 100 MG tablet, Take 100 mg by mouth 2 (two) times daily. , Disp: , Rfl:     aspirin (ECOTRIN) 81 MG EC tablet, Take 81 mg by mouth once daily., Disp: , Rfl:     CALCIUM CARBONATE/VITAMIN D3 (VITAMIN D-3 ORAL), Take 1,000 mg by mouth once daily., Disp: , Rfl:     citalopram (CELEXA) 40 MG tablet, Take 1 tablet (40 mg total) by mouth once daily., Disp: 90 tablet, Rfl: 4    fish oil-omega-3 fatty acids 300-1,000 mg capsule, Take 2 g by mouth once daily., Disp: , Rfl:     hydrOXYzine (ATARAX) 50 MG tablet, Take 1 tablet (50 mg total) by mouth nightly as needed (insomnia)., Disp: 30 tablet, Rfl: 12    LORazepam (ATIVAN) 1 MG tablet, TAKE 1 TABLET (1 MG TOTAL) BY MOUTH EVERY EVENING., Disp: 30 tablet, Rfl: 5    simvastatin (ZOCOR) 40 MG tablet, Take 1 tablet (40 mg total) by mouth once daily., Disp: 90 tablet, Rfl: 4    hydroxyzine HCL (ATARAX) 25 MG tablet, TAKE 1/2 TO 1 TABLET BY MOUTH NIGHTLY AS NEEDED FOR INSOMNIA, Disp: 30 tablet, Rfl: 1    ondansetron (ZOFRAN-ODT) 4 MG TbDL, Take 1 tablet (4 mg total) by mouth every 6 (six) hours as needed., Disp: 18 tablet, Rfl: 0    urea 50 % Crea, Apply 1 application topically once daily. Apply to affected area, Disp: 255 g, Rfl: 3  ALLERGIES:   Review of patient's allergies indicates:   Allergen Reactions    Sulfa (sulfonamide antibiotics)        VITAL SIGNS: BP (!) 142/92   Pulse 79   Ht 5' 4" (1.626 m)   Wt 88.5 kg (195 lb)   BMI 33.47 kg/m²                                                                            PHYSICAL EXAMINATION:     Incision sites healed well  No evidence of any erythema, infection or induration    Range of motion 0-115 active. (right knee: " 140)  No effusion  2+ DP pulse  No swelling, no calf tenderness  - Hellen's sign  + medial joint line tendernes  Moderate quad atrophy    Decreased mobility of patella                                                                             ASSESSMENT:                                                                                                                                               1. Status post above, doing well.                                                                                                                           PLAN:                                                                                                                                                     1. Continue with PT and HEP.   2. Emphasized quad function.  3. I have discussed return to activity in detail.   4.Patient will see us in 3 months  5. All questions were answered and patient should contact us if she  has any questions or concerns in the interim.

## 2020-06-02 ENCOUNTER — CLINICAL SUPPORT (OUTPATIENT)
Dept: REHABILITATION | Facility: HOSPITAL | Age: 60
End: 2020-06-02
Attending: ORTHOPAEDIC SURGERY
Payer: COMMERCIAL

## 2020-06-02 DIAGNOSIS — S86.812D PATELLAR TENDON RUPTURE, LEFT, SUBSEQUENT ENCOUNTER: ICD-10-CM

## 2020-06-02 PROCEDURE — 97110 THERAPEUTIC EXERCISES: CPT

## 2020-06-02 PROCEDURE — 97161 PT EVAL LOW COMPLEX 20 MIN: CPT

## 2020-06-03 NOTE — PLAN OF CARE
[facility]  Physical Therapy Initial Evaluation    Name: Marielena Yang [place]  Clinic Number: [id number]    Therapy Diagnosis:   Encounter Diagnosis   Name Primary?    Patellar tendon rupture, left, subsequent encounter      Physician: Danielle Cloud MD    Physician Orders: PT Eval and Treat  Medical Diagnosis from Referral: S86.812D (ICD-10-CM) - Patellar tendon rupture, left, subsequent encounter  Evaluation Date: 6/2/2020  Authorization Period Expiration: 06/01/2021  Plan of Care Expiration: 11/18/2020  Visit # / Visits authorized: 1/ 1    Time In: 1415  Time Out: 1500  Total Billable Time: 45 minutes    Precautions: Standard    Subjective     Date of onset: Surgery for patella fx ORIF on 09/24/2019- MD appointment on 06/1/2020  History of current condition - Marielena reports: that she was in [place] the past few months due to the pandemic but was very compliant with her exercises. The pt reports minimal pain, but has trouble with stairs, getting up and down out of a chair and generally feels weaker in her L LE than her R LE.      Imaging na     Prior Therapy: yes post operatively with good results   Exercise Routine/Sport Participation: walking, cycling, active with grandchildren   Social History: Lives at home   Occupation: Retired   Prior Level of Function: able to play with grandkids, walk up and down stairs in house and get up and down without difficulty   Current Level of Function: Unable to do the above without sig difficulty     Pain:  Current 1/10, worst 3/10, best 0/10   Location: left knee   Description: Tight  Aggravating Factors: Sitting, Standing, Flexing, Lifting, Getting out of bed/chair and stairs  Easing Factors: rest    Pts goals: improve tolerance to the activities she is limited in (see above)       Medical History:   Past Medical History:   Diagnosis Date    Anxiety and depression 1/29/2014    Colon polyps     Glaucoma     Hyperlipidemia     Hypertension     pt  "states " i don't have high blood pressure"    Keloid cicatrix     Obesity (BMI 30.0-34.9)        Surgical History:   Marielena Tracy  has a past surgical history that includes  section; Colonoscopy (N/A, 2016); Patellar tendon repair (Left, 2019); and Knee surgery (Left, 2019).    Medications:   Marielena has a current medication list which includes the following prescription(s): vitamin c, aspirin, calcium carbonate/vitamin d3, citalopram, fish oil-omega-3 fatty acids, hydroxyzine, hydroxyzine hcl, lorazepam, ondansetron, simvastatin, and urea.    Allergies:   Review of patient's allergies indicates:   Allergen Reactions    Sulfa (sulfonamide antibiotics)         Objective     Posture: unremarkable     Functional Tests:  Gait: B trendelenburg   Sit to Stand: Weight shift R, B UE use, poor descent   SLS EO: R Good , L Poor  Step Up: Poor control L with use of UE     Knee Passive Range of Motion:   Right  Left    Flexion 130 115   Extension 0 0         Lower Extremity Strength:   Right  Left    Quadriceps: 5/5 4/5   Hamstring at 90 de/5 4-/5   Hamstring at 15 de/5 4-/5   Iliopsoas (sitting): 5/5 4/5   Hip extension:  4+/5 4/5   PGM: 3/5 3/5       Special Tests:    MSI Right Left   Step Test - -   SLS + +     Joint Mobility: normal PFJ mobility     Palpation: no sig ttp     Flexibility:    Ely's test: R - ; L +    Hamstrings: R - ; L  +         CMS Impairment/Limitation/Restriction for FOTO Knee Survey    Therapist reviewed FOTO scores for Marielena Tracy on 2020.   FOTO documents entered into [employer] - see Media section.    Limitation Score: NA%  Category: Mobility       Treatment     Treatment Time In: 1400  Treatment Time Out: 1415  Total Treatment time separate from Evaluation: 15 minutes    Marielena received therapeutic exercises to develop strength and endurance for 15 minutes including:  Single Leg Bridge 2x5 B   Sit to Stands x15   Tandem walking <->     To add Next " Tx Session:   Shuttle SL   Lunges   Step ups   [facility] Taps     Home Exercises and Patient Education Provided     Education provided:   - Cont to complete other exercises at home as tolerated.   - Prognosis, activity modification, goals for therapy, role of therapy for care, exercises/HEP    Written Home Exercises Provided: yes.  Exercises were reviewed and Marielena was able to demonstrate them prior to the end of the session.   Pt received a written copy of exercises to perform at home. Marielena demonstrated good  understanding of the education provided.     See EMR under patient instructions for exercises given.     Assessment     Marielena is a 59 y.o. female referred to outpatient Physical Therapy s/p R patella ORIF 9/2019. The pt presents with continued weakness of L LE impacting her tolerance for functional activities such as stairs, sit to stands and recreational play with her grandchildren. The pt's impairments are decreasing her QOL.       Pt will benefit from skilled outpatient Physical Therapy to address the deficits stated above and in the chart below, provide pt/family education, and to maximize pt's level of independence. Pt prognosis is Excellent.     Plan of care discussed with patient: Yes  Pt's spiritual, cultural and educational needs considered and patient is agreeable to the plan of care and goals as stated below:       Anticipated Barriers for therapy: none      Medical Necessity is demonstrated by the following  History  Co-morbidities and personal factors that may impact the plan of care Co-morbidities:   high BMI    Personal Factors:   no deficits     low   Examination  Body Structures and Functions, activity limitations and participation restrictions that may impact the plan of care Body Regions:   lower extremities    Body Systems:    ROM  strength  balance  gait  motor control    Participation Restrictions:   none    Activity limitations:   Learning and applying knowledge  No deficit    General  "Tasks and Commands  No deficit    Communication  No deficit    Mobility  lifting and carrying objects  walking  stairs    Self care  No deficit    Domestic Life  No deficit    Interactions/Relationships  No deficit    Life Areas  Recreational play with grandchildren     Community and Social Life  No deficit          moderate   Clinical Presentation stable and uncomplicated low   Decision Making/ Complexity Score: low     Goals:  Short Term Goals: 2-4 weeks  1. Pt will be compliant with HEP 50% of prescribed amount.   2. The pt to demo improvement in Tolerance to single leg stance to exceed 20 seconds without nee for UE use to remain on single leg   3.  The pt to demo improvement in MMT by 1/3 grade to inc tolerance to ADls and IADLs     Long Term Goals: 24 weeks   1. Pt will be compliant with % of prescribed amount.   2. The pt to demo tolerance and no compensation for step ups 12" 5x without pain in knee and without need for UE support   3. The pt to demo tolerance to squatting without pain to improve tolerance to getting up and down out of a chair   4. The pt will report full participation in ADLs and IADLs without restrictions related to L knee .     Plan   Plan of care Certification: 6/2/2020 to 11/18/2020.    Outpatient Physical Therapy 2 times weekly for 24 weeks to include the following interventions: Manual Therapy, Moist Heat/ Ice, Neuromuscular Re-ed, Patient Education, Therapeutic Activites and Therapeutic Exercise.     Elizabeth Rivers, PT , DPT, SCS, FAAMOMPT      "

## 2020-06-12 ENCOUNTER — CLINICAL SUPPORT (OUTPATIENT)
Dept: REHABILITATION | Facility: HOSPITAL | Age: 60
End: 2020-06-12
Attending: ORTHOPAEDIC SURGERY
Payer: COMMERCIAL

## 2020-06-12 DIAGNOSIS — R53.1 WEAKNESS: ICD-10-CM

## 2020-06-12 DIAGNOSIS — M25.562 LEFT KNEE PAIN, UNSPECIFIED CHRONICITY: Primary | ICD-10-CM

## 2020-06-12 PROCEDURE — 97530 THERAPEUTIC ACTIVITIES: CPT

## 2020-06-12 PROCEDURE — 97110 THERAPEUTIC EXERCISES: CPT

## 2020-06-12 NOTE — PROGRESS NOTES
"  Physical Therapy Daily Treatment Note     Name: Marielena Yang [place]  Clinic Number: [id number]    Therapy Diagnosis:   Encounter Diagnoses   Name Primary?    Left knee pain, unspecified chronicity Yes    Weakness      Physician: Ran Thomas III, *    Visit Date: 6/12/2020  Physician: Danielle Cloud MD     Physician Orders: PT Eval and Treat  Medical Diagnosis from Referral: S86.812D (ICD-10-CM) - Patellar tendon rupture, left, subsequent encounter  Evaluation Date: 6/2/2020  Authorization Period Expiration: 12/31/2020  Plan of Care Expiration: 11/18/2020  Visit # / Visits authorized: 14/20 (visit total for this eval cycle: 2)      Time In: 0905  Time Out: 0940  Total Billable Time: 40 minutes     Precautions: Standard       Subjective     Pt reports: She has had some knee soreness week- no more activity than usual. .    She was compliant with home exercise program.  Response to previous treatment: no sig change   Functional change: no sig change     Pain: 2/10  Location: left knee      Objective     Daily Measurements: Sit to stand: inc trunk flexion, poor hip descent with dec ant knee translation B   Step Ups: poor use of L LE with inc use of R LE and UE 12"       Daily Treatment       Marielena received therapeutic exercises to develop strength, endurance and ROM for 15 minutes including:  Elliptical completed for 5 min to increase ROM, endurance and decrease pain to improve tolerance to ADLs and age related activities.   Shuttle DL 10x 4 cords   Shuttle SL 3x4 B 2 cords     Marielena participated in dynamic functional therapeutic activities to improve functional performance for 30  minutes, including:  Sit to stand education x5 min   Sit to stands 15x   Step ups 12" attempted   Step ups 6" 15x B     Home Exercises and Patient Education Provided     Education provided:   - Cont to complete exercises at home     Written Home Exercises Provided: Patient instructed to cont prior HEP.  Exercises were reviewed and " "Marielena was able to demonstrate them prior to the end of the session.  Marielena demonstrated good  understanding of the education provided.     See EMR under patient instructions for exercises given.     Assessment     The pt demonstrated sig fatigue today completing higher level exercises with increased loading to the LE. The pt required cueing and visual feedback to improve technique, excellent carryover noted. Cont to prog as damien.     Marielena is progressing well towards her goals.     Pt will continue to benefit from skilled outpatient physical therapy to address the deficits listed in the problem list box on initial evaluation, provide pt/family education and to maximize pt's level of independence in the home and community environment. Pt prognosis is Good.     Pt's spiritual, cultural and educational needs considered and pt agreeable to plan of care and goals.    Anticipated barriers to physical therapy: None    Goals:  Short Term Goals: 2-4 weeks  1. Pt will be compliant with HEP 50% of prescribed amount.   2. The pt to demo improvement in Tolerance to single leg stance to exceed 20 seconds without nee for UE use to remain on single leg   3.  The pt to demo improvement in MMT by 1/3 grade to inc tolerance to ADls and IADLs      Long Term Goals: 24 weeks   1. Pt will be compliant with % of prescribed amount.   2. The pt to demo tolerance and no compensation for step ups 12" 5x without pain in knee and without need for UE support   3. The pt to demo tolerance to squatting without pain to improve tolerance to getting up and down out of a chair   4. The pt will report full participation in ADLs and IADLs without restrictions related to L knee .     Plan     Cont to prog as damien.     Elizabeth Rivers, PT , DPT, SCS, FAAOMPT      "

## 2020-06-19 ENCOUNTER — CLINICAL SUPPORT (OUTPATIENT)
Dept: REHABILITATION | Facility: HOSPITAL | Age: 60
End: 2020-06-19
Attending: ORTHOPAEDIC SURGERY
Payer: COMMERCIAL

## 2020-06-19 DIAGNOSIS — M25.562 LEFT KNEE PAIN, UNSPECIFIED CHRONICITY: ICD-10-CM

## 2020-06-19 DIAGNOSIS — R53.1 WEAKNESS: ICD-10-CM

## 2020-06-19 PROCEDURE — 97530 THERAPEUTIC ACTIVITIES: CPT

## 2020-06-19 PROCEDURE — 97110 THERAPEUTIC EXERCISES: CPT

## 2020-06-19 NOTE — PROGRESS NOTES
"  Physical Therapy Daily Treatment Note     Name: Marielena Yang [place]  Clinic Number: [id number]    Therapy Diagnosis:   Encounter Diagnoses   Name Primary?    Left knee pain, unspecified chronicity     Weakness      Physician: Ran Thomas III, *    Visit Date: 6/19/2020  Physician: Danielle Cloud MD     Physician Orders: PT Eval and Treat  Medical Diagnosis from Referral: S86.812D (ICD-10-CM) - Patellar tendon rupture, left, subsequent encounter  Evaluation Date: 6/2/2020  Authorization Period Expiration: 12/31/2020  Plan of Care Expiration: 11/18/2020  Visit # / Visits authorized: 15/20 (visit total for this eval cycle: 2)      Time In: 1045  Time Out: 1130  Total Billable Time: 45 minutes     Precautions: Standard       Subjective     Pt reports: her knee is a little tight today anteromedially but its not painful.    She was compliant with home exercise program.  Response to previous treatment: no sig change   Functional change: no sig change     Pain: 2/10  Location: left knee      Objective     Daily Measurements: Sit to stand: inc trunk flexion, poor hip descent with dec ant knee translation B   Step Ups: poor use of L LE with inc use of R LE and UE 12"       Daily Treatment       Marielena received therapeutic exercises to develop strength, endurance and ROM for 15 minutes including:  Elliptical completed for 5 min to increase ROM, endurance and decrease pain to improve tolerance to ADLs and age related activities.     Shuttle DL4 cords 4x12     Shuttle SL 2.5 cords 3x5 B    Marielena participated in dynamic functional therapeutic activities to improve functional performance for 30  minutes, including:    Sit to stand education x5 min     Sit to stands 15x     Step ups 12" attempted     Step ups 6" 15x B     Home Exercises and Patient Education Provided     Education provided:   - Cont to complete exercises at home     Written Home Exercises Provided: Patient instructed to cont prior HEP.  Exercises were " "reviewed and Marielena was able to demonstrate them prior to the end of the session.  Marielena demonstrated good  understanding of the education provided.     See EMR under patient instructions for exercises given.     Assessment     The pt demonstraed sig faituge following elliptical and all therex today- required max cueing to improve technique with sit to stands and responded well to visual feedback. Will cont to prog as damien.     Marielena is progressing well towards her goals.     Pt will continue to benefit from skilled outpatient physical therapy to address the deficits listed in the problem list box on initial evaluation, provide pt/family education and to maximize pt's level of independence in the home and community environment. Pt prognosis is Good.     Pt's spiritual, cultural and educational needs considered and pt agreeable to plan of care and goals.    Anticipated barriers to physical therapy: None    Goals:  Short Term Goals: 2-4 weeks  1. Pt will be compliant with HEP 50% of prescribed amount.   2. The pt to demo improvement in Tolerance to single leg stance to exceed 20 seconds without nee for UE use to remain on single leg   3.  The pt to demo improvement in MMT by 1/3 grade to inc tolerance to ADls and IADLs      Long Term Goals: 24 weeks   1. Pt will be compliant with % of prescribed amount.   2. The pt to demo tolerance and no compensation for step ups 12" 5x without pain in knee and without need for UE support   3. The pt to demo tolerance to squatting without pain to improve tolerance to getting up and down out of a chair   4. The pt will report full participation in ADLs and IADLs without restrictions related to L knee .     Plan     Cont to prog as damien.     Elizabeth Rivers, PT , DPT, SCS, FAAOMPT        "

## 2020-06-19 NOTE — PROGRESS NOTES
"  Physical Therapy Daily Treatment Note     Name: Marielena Yang [place]  Clinic Number: [id number]    Therapy Diagnosis:   Encounter Diagnoses   Name Primary?    Left knee pain, unspecified chronicity     Weakness      Physician: Ran Thomas III, *    Visit Date: 6/19/2020  Physician: Danielle Cloud MD     Physician Orders: PT Eval and Treat  Medical Diagnosis from Referral: S86.812D (ICD-10-CM) - Patellar tendon rupture, left, subsequent encounter  Evaluation Date: 6/2/2020  Authorization Period Expiration: 12/31/2020  Plan of Care Expiration: 11/18/2020  Visit # / Visits authorized: 15/20 (visit total for this eval cycle: 2)      Time In: 1045  Time Out: 1130  Total Billable Time: 45 minutes     Precautions: Standard       Subjective     Pt reports: ***.    She was compliant with home exercise program.  Response to previous treatment: no sig change   Functional change: no sig change     Pain: 2/10  Location: left knee      Objective     Daily Measurements: Sit to stand: inc trunk flexion, poor hip descent with dec ant knee translation B   Step Ups: poor use of L LE with inc use of R LE and UE 12"       Daily Treatment       Marielena received therapeutic exercises to develop strength, endurance and ROM for 15 minutes including:  Elliptical completed for 5 min to increase ROM, endurance and decrease pain to improve tolerance to ADLs and age related activities.     Shuttle DL4 cords 4x12     Shuttle SL 2.5 cords 3x5 B    Marielena participated in dynamic functional therapeutic activities to improve functional performance for 30  minutes, including:    Sit to stand education x5 min     Sit to stands 15x     Step ups 12" attempted     Step ups 6" 15x B     Home Exercises and Patient Education Provided     Education provided:   - Cont to complete exercises at home     Written Home Exercises Provided: Patient instructed to cont prior HEP.  Exercises were reviewed and Marielena was able to demonstrate them prior to the end " "of the session.  Marielena demonstrated good  understanding of the education provided.     See EMR under patient instructions for exercises given.     Assessment     The pt demonstrated sig fatigue today completing higher level exercises with increased loading to the LE. The pt required cueing and visual feedback to improve technique, excellent carryover noted. Cont to prog as damien.     Marielena is progressing well towards her goals.     Pt will continue to benefit from skilled outpatient physical therapy to address the deficits listed in the problem list box on initial evaluation, provide pt/family education and to maximize pt's level of independence in the home and community environment. Pt prognosis is Good.     Pt's spiritual, cultural and educational needs considered and pt agreeable to plan of care and goals.    Anticipated barriers to physical therapy: None    Goals:  Short Term Goals: 2-4 weeks  1. Pt will be compliant with HEP 50% of prescribed amount.   2. The pt to demo improvement in Tolerance to single leg stance to exceed 20 seconds without nee for UE use to remain on single leg   3.  The pt to demo improvement in MMT by 1/3 grade to inc tolerance to ADls and IADLs      Long Term Goals: 24 weeks   1. Pt will be compliant with % of prescribed amount.   2. The pt to demo tolerance and no compensation for step ups 12" 5x without pain in knee and without need for UE support   3. The pt to demo tolerance to squatting without pain to improve tolerance to getting up and down out of a chair   4. The pt will report full participation in ADLs and IADLs without restrictions related to L knee .     Plan     Cont to prog as damien.     Elizabeth Rivers, PT , DPT, SCS, FAAOMPT      "

## 2020-06-25 NOTE — PROGRESS NOTES
Physical Therapy Daily Treatment Note     Name: Marielena Yang [place]  Clinic Number: [id number]    Therapy Diagnosis:   Encounter Diagnoses   Name Primary?    Range of motion deficit     Weakness      Physician: Ran Thomas III, *    Visit Date: 10/29/2019   Physician: Ran Thomas III, *  S82.032A (ICD-10-CM) - Closed displaced transverse fracture of left patella, initial encounter    S82.032A (ICD-10-CM) - Displaced transverse fracture of left patella, initial encounter for closed fracture  Physician Orders: 2    Medical Diagnosis: Eval and treat   Date of Surgery: 9/24/19  Evaluation Date: 10/8/2019  Authorization Period Expiration: 12/31/19  Plan of Care Certification Period: 1/28/20  Visit # / Visits authorized: 4/ 20    Precautions: Weightbearing  POSTOPERATIVE PLAN: Remain PWB for 6 weeks, locked out straight x 4 weeks    Time in 1455  Time out  1555  Tx time 50'  Totol tx time 60'      Subjective      Pt reports: No pain but c/o bad night sleep do to discomfort. Stated bad day today.   Response to previous treatment: min R LE due to SL support in standing  Functional change: W/C and crutches     Pain: 0/10  Location: left knee      Objective   Pt entered PT in W/C with Knee brace locked @ 0'    Marielena received therapeutic exercises to develop strength, endurance, ROM, flexibility and core stabilization for 44 minutes including:  Standing w/brace hip abd 3x10  Standing w/brace hip ext 3x10  Standing w/brace hip flex 3x10  Standing w/brace weight shifting PWB L LE 5'  SLR w/brace 3x10  L SL hip abd w/brace 3x10  Prone L hip ext w/brace 3x10  QS w/o brace 15x     Marielena received the following manual therapy techniques: Joint mobilizations, Myofacial release and Soft tissue Mobilization were applied to the: left knee for 10 minutes, including: patella medial/lateral mobs, GSS     Marielena participated in gait training to improve functional mobility and safety for 10'  Health Maintenance Due   Topic Date Due   • DTaP/Tdap/Td Vaccine (1 - Tdap) 07/03/1942   • DM/CKD Microalbumin  07/03/1948   • Diabetes Foot Exam  07/03/1949   • Hepatitis B Vaccine (1 of 3 - Risk 3-dose series) 07/03/1949   • Shingles Vaccine (2 of 3) 07/03/2012   • Diabetes Eye Exam  06/13/2019   • Medicare Wellness 65+  10/08/2020       Patient is due for topics as listed above but is not proceeding with Immunization(s) Dtap/Tdap/Td, Hep B and Shingles, Diabetes Eye Exam, Diabetes Foot Exam and DM/CKD Microalbumin at this time. Patient declined all immunizations she does not want.  Foot exam to be done by provider.  Eye at patient's convenience.  MWV to be scheduled for next exam.         minutes, including: proper use of crutches, PWB and correct gait pattern -ambulating with crutches approx 25' x 2.     RICE for L knee for 10' for decreased pain, edema and circulation       Home Exercises Provided and Patient Education Provided     Education provided:   PWB with crutches     Written Home Exercises Provided: no .       Assessment     Pt is at 5 weeks and per MD note we unlocked her brace (POSTOPERATIVE PLAN: Remain PWB for 6 weeks, locked out straight x 4 weeks).   She maintained PWB, but we did begin ROM to 45 degrees without any issues.  I have communicated with PTA regarding ROM and POC.  Continued exercises progress as tolerated.    Marielena is progressing well towards her goals.   Pt prognosis is Good.     Pt will continue to benefit from skilled outpatient physical therapy to address the deficits listed in the problem list box on initial evaluation, provide pt/family education and to maximize pt's level of independence in the home and community environment.     Pt's spiritual, cultural and educational needs considered and pt agreeable to plan of care and goals.    Anticipated barriers to physical therapy: none     Goals:    In 4-8 weeks, pt. will:  1. Pt will increase ROM to the left knee to 90 deg of flexion,  in order to perform ADLs and IADLs more effectively   2. Decrease overall pain to the left knee to 3-4/10,  on average with daily activities   3. Increase strength to the left LE to 3+/5 grossly throughout,  in order to perform ADLs and IADLs more effectively   4. Improve FOTO intake score to 45 to demonstrate improvement with functional mobility and use  5. Independent with HEP  Long Term GOALS:  In 8-16 weeks, pt. will:  1. Pt will increase ROM to the left knee to WNL,  in order to perform ADLs and IADLs more effectively   2. Decrease overall pain to left knee to 1-2/10,  on average with daily activities   3. Increase strength to the left LE to 4/5,  in order to perform ADLs and IADLs  more effectively   4. Improve FOTO intake score to 70 to demonstrate improvement with functional mobility and use  5. Independent with HEP  6. Return to full recreational and community walking  unrestricted        Plan     Continue therapy as planned and progress accordingly.      Hamilton Mckeon, PT, STS

## 2020-06-26 ENCOUNTER — CLINICAL SUPPORT (OUTPATIENT)
Dept: REHABILITATION | Facility: HOSPITAL | Age: 60
End: 2020-06-26
Attending: ORTHOPAEDIC SURGERY
Payer: COMMERCIAL

## 2020-06-26 DIAGNOSIS — R53.1 WEAKNESS: ICD-10-CM

## 2020-06-26 DIAGNOSIS — M25.562 LEFT KNEE PAIN, UNSPECIFIED CHRONICITY: ICD-10-CM

## 2020-06-26 PROCEDURE — 97110 THERAPEUTIC EXERCISES: CPT

## 2020-06-26 PROCEDURE — 97530 THERAPEUTIC ACTIVITIES: CPT

## 2020-06-26 PROCEDURE — 97112 NEUROMUSCULAR REEDUCATION: CPT

## 2020-06-26 NOTE — PROGRESS NOTES
"    Physical Therapy Daily Treatment Note     Name: Marielena Yang [place]  Clinic Number: [id number]    Therapy Diagnosis:   Encounter Diagnoses   Name Primary?    Left knee pain, unspecified chronicity     Weakness      Physician: Ran Thomas III, *    Visit Date: 6/19/2020  Physician: Danielle Cloud MD     Physician Orders: PT Eval and Treat  Medical Diagnosis from Referral: S86.812D (ICD-10-CM) - Patellar tendon rupture, left, subsequent encounter  Evaluation Date: 6/2/2020  Authorization Period Expiration: 12/31/2020  Plan of Care Expiration: 11/18/2020  Visit # / Visits authorized: 16/20 (visit total for this eval cycle: 4)      Time In: 0945  Time Out: 1030  Total Billable Time: 45 minutes     Precautions: Standard       Subjective     Pt reports: She has been feeling improvement in N+T in knee since starting new exercises.     She was compliant with home exercise program.  Response to previous treatment: no sig change   Functional change: no sig change     Pain: 2/10  Location: left knee      Objective     Daily Measurements: Sit to stand: inc trunk flexion, poor hip descent with dec ant knee translation B   Step Ups: poor use of L LE with inc use of R LE and UE 12"       Daily Treatment     Marielena received therapeutic exercises to develop strength, endurance and ROM for 15 minutes including:  Elliptical completed for 5 min to increase ROM, endurance and decrease pain to improve tolerance to ADLs and age related activities.     Shuttle DL4.5 cords 4x10     Shuttle SL 3 cords 3x5 B    Marielena participated in dynamic functional therapeutic activities to improve functional performance for 22  minutes, including:    Sit to stands 20x     Step ups 8" 2x10 B     Step ups 12" x10 B    Marielena participated in neuromuscular re-education activities to improve: Posture and motor control for 08 minutes. The following activities were included:    SL RDL to cone x8 B      Home Exercises and Patient Education Provided " "    Education provided:   - Cont to complete exercises at home     Written Home Exercises Provided: Patient instructed to cont prior HEP.  Exercises were reviewed and Marielena was able to demonstrate them prior to the end of the session.  Marielena demonstrated good  understanding of the education provided.     See EMR under patient instructions for exercises given.     Assessment     The pt with sig improvement in tolerance to step ups, able to complete 12" with no hesitation and no UE support, Cont to demo sig weight shift form L LE during sit to stands but visual feedback A to improve technique.     Marielena is progressing well towards her goals.     Pt will continue to benefit from skilled outpatient physical therapy to address the deficits listed in the problem list box on initial evaluation, provide pt/family education and to maximize pt's level of independence in the home and community environment. Extender used in session: Kate WINSTON ATC    Pt prognosis is Good.     Pt's spiritual, cultural and educational needs considered and pt agreeable to plan of care and goals.    Anticipated barriers to physical therapy: None    Goals:  Short Term Goals: 2-4 weeks  1. Pt will be compliant with HEP 50% of prescribed amount.   2. The pt to demo improvement in Tolerance to single leg stance to exceed 20 seconds without nee for UE use to remain on single leg   3.  The pt to demo improvement in MMT by 1/3 grade to inc tolerance to ADls and IADLs      Long Term Goals: 24 weeks   1. Pt will be compliant with % of prescribed amount.   2. The pt to demo tolerance and no compensation for step ups 12" 5x without pain in knee and without need for UE support   3. The pt to demo tolerance to squatting without pain to improve tolerance to getting up and down out of a chair   4. The pt will report full participation in ADLs and IADLs without restrictions related to L knee .     Plan     Cont to prog as damien.     Elizabeth " Tita, PT , DPT, SCS, FAAOMPT

## 2020-07-01 ENCOUNTER — CLINICAL SUPPORT (OUTPATIENT)
Dept: REHABILITATION | Facility: HOSPITAL | Age: 60
End: 2020-07-01
Attending: INTERNAL MEDICINE
Payer: COMMERCIAL

## 2020-07-01 DIAGNOSIS — M25.562 LEFT KNEE PAIN, UNSPECIFIED CHRONICITY: ICD-10-CM

## 2020-07-01 DIAGNOSIS — R53.1 WEAKNESS: ICD-10-CM

## 2020-07-01 PROCEDURE — 97530 THERAPEUTIC ACTIVITIES: CPT

## 2020-07-01 PROCEDURE — 97112 NEUROMUSCULAR REEDUCATION: CPT

## 2020-07-01 PROCEDURE — 97110 THERAPEUTIC EXERCISES: CPT

## 2020-07-01 NOTE — PROGRESS NOTES
"  Physical Therapy Daily Treatment Note     Name: Marielena Yang [place]  Clinic Number: [id number]    Therapy Diagnosis:   Encounter Diagnoses   Name Primary?    Left knee pain, unspecified chronicity     Weakness      Physician: Ran Thomas III, *    Visit Date: 6/19/2020  Physician: Danielle Cloud MD     Physician Orders: PT Eval and Treat  Medical Diagnosis from Referral: S86.812D (ICD-10-CM) - Patellar tendon rupture, left, subsequent encounter  Evaluation Date: 6/2/2020  Authorization Period Expiration: 12/31/2020  Plan of Care Expiration: 11/18/2020  Visit # / Visits authorized: 17/20 (visit total for this eval cycle: 4)      Time In: 0818  Time Out: 0903  Total Billable Time: 45 minutes     Precautions: Standard       Subjective     Pt reports: she has been feeling stronger in her LE.     She was compliant with home exercise program.  Response to previous treatment: no sig change   Functional change: no sig change     Pain: 2/10  Location: left knee      Objective     Daily Measurements: Sit to stand: inc trunk flexion, poor hip descent with dec ant knee translation B   Step Ups: poor use of L LE with inc use of R LE and UE 12"       Daily Treatment     Marielena received therapeutic exercises to develop strength, endurance and ROM for 15 minutes including:  Elliptical completed for 10 min to increase ROM, endurance and decrease pain to improve tolerance to ADLs and age related activities.     Shuttle DL4.5 cords 4x10     Shuttle SL 3 cords 3x5 B    Marielena participated in dynamic functional therapeutic activities to improve functional performance for 22  minutes, including:    Split squat education x5min    Split squat dowel 2x5 each    Marielena participated in neuromuscular re-education activities to improve: Posture and motor control for 08 minutes. The following activities were included:    SL RDL to cone x8 B      Home Exercises and Patient Education Provided     Education provided:   - Cont to complete " "exercises at home     Written Home Exercises Provided: Patient instructed to cont prior HEP.  Exercises were reviewed and Marielena was able to demonstrate them prior to the end of the session.  Marielena demonstrated good  understanding of the education provided.     See EMR under patient instructions for exercises given.     Assessment   The pt with good tolerance to all therex, required sig cueing to improve technique and confidence for split squats due to dec tolerance to compression of knee and underlying HS/gastroc strength. The pt advised to cont to complete strength based exercises at home.     Marielena is progressing well towards her goals.     Pt will continue to benefit from skilled outpatient physical therapy to address the deficits listed in the problem list box on initial evaluation, provide pt/family education and to maximize pt's level of independence in the home and community environment. Extender used in session: Kate WINSTON ATC    Pt prognosis is Good.     Pt's spiritual, cultural and educational needs considered and pt agreeable to plan of care and goals.    Anticipated barriers to physical therapy: None    Goals:  Short Term Goals: 2-4 weeks  1. Pt will be compliant with HEP 50% of prescribed amount.   2. The pt to demo improvement in Tolerance to single leg stance to exceed 20 seconds without nee for UE use to remain on single leg   3.  The pt to demo improvement in MMT by 1/3 grade to inc tolerance to ADls and IADLs      Long Term Goals: 24 weeks   1. Pt will be compliant with % of prescribed amount.   2. The pt to demo tolerance and no compensation for step ups 12" 5x without pain in knee and without need for UE support   3. The pt to demo tolerance to squatting without pain to improve tolerance to getting up and down out of a chair   4. The pt will report full participation in ADLs and IADLs without restrictions related to L knee .     Plan     Cont to prog as sharlene Johnson " Tita, PT , DPT, SCS, FAAOMPT

## 2020-07-09 ENCOUNTER — CLINICAL SUPPORT (OUTPATIENT)
Dept: REHABILITATION | Facility: HOSPITAL | Age: 60
End: 2020-07-09
Attending: ORTHOPAEDIC SURGERY
Payer: COMMERCIAL

## 2020-07-09 DIAGNOSIS — M25.562 LEFT KNEE PAIN, UNSPECIFIED CHRONICITY: ICD-10-CM

## 2020-07-09 DIAGNOSIS — R53.1 WEAKNESS: ICD-10-CM

## 2020-07-09 PROCEDURE — 97112 NEUROMUSCULAR REEDUCATION: CPT

## 2020-07-09 PROCEDURE — 97110 THERAPEUTIC EXERCISES: CPT

## 2020-07-09 NOTE — PROGRESS NOTES
"  Physical Therapy Daily Treatment Note     Name: Marielena Yang [place]  Clinic Number: [id number]    Therapy Diagnosis:   Encounter Diagnoses   Name Primary?    Left knee pain, unspecified chronicity     Weakness      Physician: Ran Thomas III, *    Visit Date: 6/19/2020  Physician: Danielle Cloud MD     Physician Orders: PT Eval and Treat  Medical Diagnosis from Referral: S86.812D (ICD-10-CM) - Patellar tendon rupture, left, subsequent encounter  Evaluation Date: 6/2/2020  Authorization Period Expiration: 12/31/2020  Plan of Care Expiration: 11/18/2020  Visit # / Visits authorized: 18/20 (visit total for this eval cycle: 4)      Time In: 0820  Time Out: 0900  Total Billable Time: 25 minutes     Precautions: Standard       Subjective     Pt reports: she was really sore after last visit and then did more activity with her grandchildren further exaccerbating her soreness. The pt reports it took her until yesterday to fully recover.     She was compliant with home exercise program.  Response to previous treatment: no sig change   Functional change: no sig change     Pain: 2/10  Location: left knee      Objective     Daily Measurements: Sit to stand: inc trunk flexion, poor hip descent with dec ant knee translation B   Step Ups: poor use of L LE with inc use of R LE and UE 12"       Daily Treatment     Marielena received therapeutic exercises to develop strength, endurance and ROM for 25 minutes including:  Bike completed for 10 min to increase ROM, endurance and decrease pain to improve tolerance to ADLs and age related activities.     SLR to HS press down black power band 2x10 B     Hand Heel rocks 20x     Marielena participated in dynamic functional therapeutic activities to improve functional performance for 00  minutes, including:        Marielena participated in neuromuscular re-education activities to improve: Posture and motor control for 20 minutes. The following activities were included:    Lateral lunge <-> " "  Scoops <->   Walking butt kicks <->     Home Exercises and Patient Education Provided     Education provided:   - Cont to complete exercises at home     Written Home Exercises Provided: Patient instructed to cont prior HEP.  Exercises were reviewed and Marielena was able to demonstrate them prior to the end of the session.  Marielena demonstrated good  understanding of the education provided.     See EMR under patient instructions for exercises given.     Assessment   The pt therex today focused on reviewing active recovery movements to decrease soreness and improve tolerance to activity following a physically demanding exercise session or daily tasks. The pt demo'd difficulty and hesitation with kneeling on L LE but was able to complete the exercise with extra padding under her knee. The pt with good understanding of the improtance of active recovery and how to complete it at home in the future.     Marielena is progressing well towards her goals.     Pt will continue to benefit from skilled outpatient physical therapy to address the deficits listed in the problem list box on initial evaluation, provide pt/family education and to maximize pt's level of independence in the home and community environment. Extender used in session: Kate Iverson LAT, ATC    Pt prognosis is Good.     Pt's spiritual, cultural and educational needs considered and pt agreeable to plan of care and goals.    Anticipated barriers to physical therapy: None    Goals:  Short Term Goals: 2-4 weeks  1. Pt will be compliant with HEP 50% of prescribed amount.   2. The pt to demo improvement in Tolerance to single leg stance to exceed 20 seconds without nee for UE use to remain on single leg   3.  The pt to demo improvement in MMT by 1/3 grade to inc tolerance to ADls and IADLs      Long Term Goals: 24 weeks   1. Pt will be compliant with % of prescribed amount.   2. The pt to demo tolerance and no compensation for step ups 12" 5x without pain " in knee and without need for UE support   3. The pt to demo tolerance to squatting without pain to improve tolerance to getting up and down out of a chair   4. The pt will report full participation in ADLs and IADLs without restrictions related to L knee .     Plan     Cont to prog as damien.     Elizabeth Rivers, PT , DPT, SCS, FAAOMPT

## 2020-07-17 ENCOUNTER — CLINICAL SUPPORT (OUTPATIENT)
Dept: REHABILITATION | Facility: HOSPITAL | Age: 60
End: 2020-07-17
Attending: ORTHOPAEDIC SURGERY
Payer: COMMERCIAL

## 2020-07-17 DIAGNOSIS — R53.1 WEAKNESS: ICD-10-CM

## 2020-07-17 DIAGNOSIS — M25.562 LEFT KNEE PAIN, UNSPECIFIED CHRONICITY: ICD-10-CM

## 2020-07-17 PROCEDURE — 97110 THERAPEUTIC EXERCISES: CPT

## 2020-07-17 NOTE — PROGRESS NOTES
"  Physical Therapy Daily Treatment Note     Name: Marielena Yang [place]  Clinic Number: [id number]    Therapy Diagnosis:   Encounter Diagnoses   Name Primary?    Left knee pain, unspecified chronicity     Weakness      Physician: Ran Thomas III, *    Visit Date: 6/19/2020  Physician: Danielle Clodu MD     Physician Orders: PT Eval and Treat  Medical Diagnosis from Referral: S86.812D (ICD-10-CM) - Patellar tendon rupture, left, subsequent encounter  Evaluation Date: 6/2/2020  Authorization Period Expiration: 12/31/2020  Plan of Care Expiration: 11/18/2020  Visit # / Visits authorized: 19/20 (visit total for this eval cycle: 5)      Time In: 0900  Time Out: 0945  Total Billable Time: 45 minutes     Precautions: Standard       Subjective     Pt reports: she feels great, she was worried she would have pain with all the rain we have had in the past week but states she did not feel any at all. She states she feels some achiness time to time but much better overall.     She was compliant with home exercise program.  Response to previous treatment: no sig change   Functional change: no sig change     Pain: 2/10  Location: left knee      Objective     Daily Measurements: Sit to stand: inc trunk flexion, poor hip descent with dec ant knee translation B   Step Ups: poor use of L LE with inc use of R LE and UE 12"       Daily Treatment     Marielena received therapeutic exercises to develop strength, endurance and ROM for 40 minutes including:  Bike completed for 10 min to increase ROM, endurance and decrease pain to improve tolerance to ADLs and age related activities.     Hand Heel rocks 20x     Shuttle DL 2 cords 10x, SL 10x; 2.5 cords 3x5 B SL     Monster walking backward only <-> 3x gtb at ankles     Marielena participated in dynamic functional therapeutic activities to improve functional performance for 00  minutes, including:    Marielena participated in neuromuscular re-education activities to improve: Posture and motor " "control for 20 minutes. The following activities were included:        Home Exercises and Patient Education Provided     Education provided:   - Cont to complete exercises at home     Written Home Exercises Provided: Patient instructed to cont prior HEP.  Exercises were reviewed and Marielena was able to demonstrate them prior to the end of the session.  Marielena demonstrated good  understanding of the education provided.     See EMR under patient instructions for exercises given.     Assessment   The pt with good tolerance to all therex, good demonstration of SL strength but increased difficulty with L LE compared to R LE.     Marielena is progressing well towards her goals.     Pt will continue to benefit from skilled outpatient physical therapy to address the deficits listed in the problem list box on initial evaluation, provide pt/family education and to maximize pt's level of independence in the home and community environment. Extender used in session: Kate WINSTON ATC    Pt prognosis is Good.     Pt's spiritual, cultural and educational needs considered and pt agreeable to plan of care and goals.    Anticipated barriers to physical therapy: None    Goals:  Short Term Goals: 2-4 weeks  1. Pt will be compliant with HEP 50% of prescribed amount.   2. The pt to demo improvement in Tolerance to single leg stance to exceed 20 seconds without nee for UE use to remain on single leg   3.  The pt to demo improvement in MMT by 1/3 grade to inc tolerance to ADls and IADLs      Long Term Goals: 24 weeks   1. Pt will be compliant with % of prescribed amount.   2. The pt to demo tolerance and no compensation for step ups 12" 5x without pain in knee and without need for UE support   3. The pt to demo tolerance to squatting without pain to improve tolerance to getting up and down out of a chair   4. The pt will report full participation in ADLs and IADLs without restrictions related to L knee .     Plan     Cont to " prog as damien.     Elizabeth Rivers, PT , DPT, SCS, FAAOMPT

## 2020-07-24 ENCOUNTER — CLINICAL SUPPORT (OUTPATIENT)
Dept: REHABILITATION | Facility: HOSPITAL | Age: 60
End: 2020-07-24
Attending: ORTHOPAEDIC SURGERY
Payer: COMMERCIAL

## 2020-07-24 DIAGNOSIS — M25.562 LEFT KNEE PAIN, UNSPECIFIED CHRONICITY: ICD-10-CM

## 2020-07-24 DIAGNOSIS — R53.1 WEAKNESS: ICD-10-CM

## 2020-07-24 PROCEDURE — 97112 NEUROMUSCULAR REEDUCATION: CPT

## 2020-07-24 PROCEDURE — 97110 THERAPEUTIC EXERCISES: CPT

## 2020-07-24 NOTE — PROGRESS NOTES
"  Physical Therapy Daily Treatment Note     Name: Marielena Yang [place]  Clinic Number: [id number]    Therapy Diagnosis:   Encounter Diagnoses   Name Primary?    Left knee pain, unspecified chronicity     Weakness      Physician: Ran Thomas III, *    Visit Date: 6/19/2020  Physician: Danielle Cloud MD     Physician Orders: PT Eval and Treat  Medical Diagnosis from Referral: S86.812D (ICD-10-CM) - Patellar tendon rupture, left, subsequent encounter  Evaluation Date: 6/2/2020  Authorization Period Expiration: 12/31/2020  Plan of Care Expiration: 11/18/2020  Visit # / Visits authorized: 20/20 (visit total for this eval cycle: 5)      Time In: 0955  Time Out: 1050   Total Billable Time: 38 minutes     Precautions: Standard       Subjective     Pt reports: that she still gets soreness and tightness anterior knee but has been managing her sx with stretching and completing her HEP. The pt reports that she still feels limited when she is just on her L LE but able to walk up and down the stairs without difficulty anymore.     She was compliant with home exercise program.  Response to previous treatment: no sig change   Functional change: able to complete reciprocal pattern with stairs.     Pain: 2/10  Location: left knee      Objective     Daily Measurements: Sit to stand: mild increase in trunk flexion and hip hinge, mild weight shift R.   Step Ups: able to complete 8" step 5x no difficulty R/L; compensatory pattern with 12" step   Y balance dec knee flexion angle L 50% of the distance compared to R LE       Daily Treatment     Marielena received therapeutic exercises to develop strength, endurance and ROM for 40 minutes including:  Bike completed for 10 min lvl 6 to increase ROM, endurance and decrease pain to improve tolerance to ADLs and age related activities.     Hand Heel rocks 20x     Shuttle DL 2.5 cords 10x, SL 10x; DL 3 cords, L 2x10       Marielena participated in dynamic functional therapeutic activities to " improve functional performance for 00  minutes, including:      Marielena participated in neuromuscular re-education activities to improve: Posture and motor control for 20 minutes. The following activities were included:    Stick assisted Y balance 2x3 ea     Elevated DL bridge 4x10, cuing to squeeze glutes at top    Home Exercises and Patient Education Provided     Education provided:   - Cont to complete exercises at home     Written Home Exercises Provided: Patient instructed to cont prior HEP.  Exercises were reviewed and Marielena was able to demonstrate them prior to the end of the session.  Marielena demonstrated good  understanding of the education provided.     See EMR under patient instructions for exercises given.     Assessment   The pt with excellent tolerance to progression of strength related activities darryn with increasing load on the leg press machine. The pt demonstrated initial fear with unilateral postural control exercises with slight improvement over multiple repeitions. The pt is making excellent progress towards goals.     Marielena is progressing well towards her goals.     Pt will continue to benefit from skilled outpatient physical therapy to address the deficits listed in the problem list box on initial evaluation, provide pt/family education and to maximize pt's level of independence in the home and community environment. Extender used in session: Kate Iverson LAT, ATC    Pt prognosis is Good.     Pt's spiritual, cultural and educational needs considered and pt agreeable to plan of care and goals.    Anticipated barriers to physical therapy: None    Goals:  Short Term Goals: 2-4 weeks- MET  1. Pt will be compliant with HEP 50% of prescribed amount.   2. The pt to demo improvement in Tolerance to single leg stance to exceed 20 seconds without nee for UE use to remain on single leg   3.  The pt to demo improvement in MMT by 1/3 grade to inc tolerance to ADls and IADLs      Long Term Goals: 24  "weeks   1. Pt will be compliant with % of prescribed amount. MET  2. The pt to demo tolerance and no compensation for step ups 12" 5x without pain in knee and without need for UE support progressing not met   3. The pt to demo tolerance to squatting without pain to improve tolerance to getting up and down out of a chair- progressing not met   4. The pt will report full participation in ADLs and IADLs without restrictions related to L knee .  Progressing not met.     Plan     Cont to prog as damien.     Elizabeth Rivers, PT , DPT, SCS, FAAOMPT                "

## 2020-07-30 ENCOUNTER — CLINICAL SUPPORT (OUTPATIENT)
Dept: REHABILITATION | Facility: HOSPITAL | Age: 60
End: 2020-07-30
Attending: ORTHOPAEDIC SURGERY
Payer: COMMERCIAL

## 2020-07-30 DIAGNOSIS — M25.562 LEFT KNEE PAIN, UNSPECIFIED CHRONICITY: ICD-10-CM

## 2020-07-30 DIAGNOSIS — R53.1 WEAKNESS: ICD-10-CM

## 2020-07-30 PROCEDURE — 97110 THERAPEUTIC EXERCISES: CPT

## 2020-07-30 PROCEDURE — 97112 NEUROMUSCULAR REEDUCATION: CPT

## 2020-07-30 NOTE — PROGRESS NOTES
"  Physical Therapy Daily Treatment Note     Name: Marielena Yang [place]  Clinic Number: [id number]    Therapy Diagnosis:   Encounter Diagnoses   Name Primary?    Left knee pain, unspecified chronicity     Weakness      Physician: Ran Thomas III, *    Visit Date: 6/19/2020  Physician: Danielle Cloud MD     Physician Orders: PT Eval and Treat  Medical Diagnosis from Referral: S86.812D (ICD-10-CM) - Patellar tendon rupture, left, subsequent encounter  Evaluation Date: 6/2/2020  Authorization Period Expiration: 12/31/2020  Plan of Care Expiration: 11/18/2020  Visit # / Visits authorized: 20/20 (visit total for this eval cycle: 5)      Time In: 1030  Time Out: 1115  Total Billable Time: 38 minutes     Precautions: Standard       Subjective     Pt reports: she has been feeling a little sore in her muscles after the new exercises- knee felt a little tight but is feeling better now.     She was compliant with home exercise program.  Response to previous treatment: no sig change   Functional change: able to complete reciprocal pattern with stairs.     Pain: 2/10  Location: left knee      Objective     Daily Measurements: Sit to stand: mild increase in trunk flexion and hip hinge, mild weight shift R.   Step Ups: able to complete 8" step 5x no difficulty R/L; compensatory pattern with 12" step   Y balance dec knee flexion angle L 50% of the distance compared to R LE       Daily Treatment     Marielena received therapeutic exercises to develop strength, endurance and ROM for 28 minutes including:  Bike completed for 10 min lvl 6 to increase ROM, endurance and decrease pain to improve tolerance to ADLs and age related activities.     DL shuttle 3 cords x10   SL shuttle 3 cords 2x8    Marielena participated in dynamic functional therapeutic activities to improve functional performance for 00  minutes, including:      Marielena participated in neuromuscular re-education activities to improve: Posture and motor control for 18 " "minutes. The following activities were included:    Stick assisted Y balance 2x3 ea, shoes off    Elevated bridge 2-1, 4x5 ea, cuing to squeeze glutes at top    Home Exercises and Patient Education Provided     Education provided:   - Cont to complete exercises at home     Written Home Exercises Provided: Patient instructed to cont prior HEP.  Exercises were reviewed and Marielena was able to demonstrate them prior to the end of the session.  Marielena demonstrated good  understanding of the education provided.     See EMR under patient instructions for exercises given.     Assessment   The pt required cueing and encouragement to flex knee during unilateral postural control exercises due to fear and weakness. The pt with improvement in technique following. Requires skilled intervention focused on strength and stability of LE to ensure improved tolerance to ADls and IADLs.     Marielena is progressing well towards her goals.     Pt will continue to benefit from skilled outpatient physical therapy to address the deficits listed in the problem list box on initial evaluation, provide pt/family education and to maximize pt's level of independence in the home and community environment. Extender used in session: Kate Iverson LAT, ATC    Pt prognosis is Good.     Pt's spiritual, cultural and educational needs considered and pt agreeable to plan of care and goals.    Anticipated barriers to physical therapy: None    Goals:  Short Term Goals: 2-4 weeks- MET  1. Pt will be compliant with HEP 50% of prescribed amount.   2. The pt to demo improvement in Tolerance to single leg stance to exceed 20 seconds without nee for UE use to remain on single leg   3.  The pt to demo improvement in MMT by 1/3 grade to inc tolerance to ADls and IADLs      Long Term Goals: 24 weeks   1. Pt will be compliant with % of prescribed amount. MET  2. The pt to demo tolerance and no compensation for step ups 12" 5x without pain in knee and " without need for UE support progressing not met   3. The pt to demo tolerance to squatting without pain to improve tolerance to getting up and down out of a chair- progressing not met   4. The pt will report full participation in ADLs and IADLs without restrictions related to L knee .  Progressing not met.     Plan     Cont to prog as damien.     Elizabeth Rivers, PT , DPT, SCS, FAAOMPT

## 2020-08-06 ENCOUNTER — CLINICAL SUPPORT (OUTPATIENT)
Dept: REHABILITATION | Facility: HOSPITAL | Age: 60
End: 2020-08-06
Attending: ORTHOPAEDIC SURGERY
Payer: COMMERCIAL

## 2020-08-06 DIAGNOSIS — R53.1 WEAKNESS: ICD-10-CM

## 2020-08-06 DIAGNOSIS — M25.562 LEFT KNEE PAIN, UNSPECIFIED CHRONICITY: ICD-10-CM

## 2020-08-06 PROCEDURE — 97112 NEUROMUSCULAR REEDUCATION: CPT

## 2020-08-06 PROCEDURE — 97110 THERAPEUTIC EXERCISES: CPT

## 2020-08-06 NOTE — PROGRESS NOTES
"  Physical Therapy Daily Treatment Note     Name: Marielena Yang [place]  Clinic Number: [id number]    Therapy Diagnosis:   Encounter Diagnoses   Name Primary?    Left knee pain, unspecified chronicity     Weakness      Physician: Ran Thomas III, *    Visit Date: 6/19/2020  Physician: Danielle Cloud MD     Physician Orders: PT Eval and Treat  Medical Diagnosis from Referral: S86.812D (ICD-10-CM) - Patellar tendon rupture, left, subsequent encounter  Evaluation Date: 6/2/2020  Authorization Period Expiration: 12/31/2020  Plan of Care Expiration: 11/18/2020  Visit # / Visits authorized: 2/20  Total visits: 22    Time In: 1030  Time Out: 1115  Total Billable Time: 30 minutes     Precautions: Standard       Subjective     Pt reports: She has been feeling a little sore because she over did it on the treadmil the other day- feels achy today in her knee.     She was compliant with home exercise program.  Response to previous treatment: no sig change   Functional change: able to complete reciprocal pattern with stairs.     Pain: 2/10  Location: left knee      Objective     Daily Measurements: Sit to stand: mild increase in trunk flexion and hip hinge, mild weight shift R.   Step Ups: able to complete 8" step 5x no difficulty R/L; compensatory pattern with 12" step   Y balance dec knee flexion angle L 50% of the distance compared to R LE       Daily Treatment     Marielena received therapeutic exercises to develop strength, endurance and ROM for 28 minutes including:  Bike completed for 10 min lvl 6 to increase ROM, endurance and decrease pain to improve tolerance to ADLs and age related activities.   DL shuttle 2.5 cords 3x10   SL shuttle 2.5 cords 3x8    Marielena participated in dynamic functional therapeutic activities to improve functional performance for 00  minutes, including:      Marielena participated in neuromuscular re-education activities to improve: Posture and motor control for 15 minutes. The following " "activities were included:  Iso hold against wall 45 deg squat [id number]  Iso knee flex red ball [id number]  Stick assisted Y balance 3x5 ea    Home Exercises and Patient Education Provided     Education provided:   - Cont to complete exercises at home     Written Home Exercises Provided: Patient instructed to cont prior HEP.  Exercises were reviewed and Marielena was able to demonstrate them prior to the end of the session.  Marielena demonstrated good  understanding of the education provided.     See EMR under patient instructions for exercises given.     Assessment   The patient was able to complete all therex with improvement in pain following. Improvement in ability to complete Y balance with less HHA from dowel and reported improvement in confidence with unilateral postural control.      Marielena is progressing well towards her goals.     Pt will continue to benefit from skilled outpatient physical therapy to address the deficits listed in the problem list box on initial evaluation, provide pt/family education and to maximize pt's level of independence in the home and community environment. Extender used in session: Kate WINSTON, ATC    Pt prognosis is Good.     Pt's spiritual, cultural and educational needs considered and pt agreeable to plan of care and goals.    Anticipated barriers to physical therapy: None    Goals:  Short Term Goals: 2-4 weeks- MET  1. Pt will be compliant with HEP 50% of prescribed amount.   2. The pt to demo improvement in Tolerance to single leg stance to exceed 20 seconds without nee for UE use to remain on single leg   3.  The pt to demo improvement in MMT by 1/3 grade to inc tolerance to ADls and IADLs      Long Term Goals: 24 weeks   1. Pt will be compliant with % of prescribed amount. MET  2. The pt to demo tolerance and no compensation for step ups 12" 5x without pain in knee and without need for UE support progressing not met   3. The pt to demo tolerance to squatting " without pain to improve tolerance to getting up and down out of a chair- progressing not met   4. The pt will report full participation in ADLs and IADLs without restrictions related to L knee .  Progressing not met.     Plan     Cont to prog as damien.     Elizabeth Rivers, PT , DPT, SCS, FAAOMPT

## 2020-08-14 ENCOUNTER — CLINICAL SUPPORT (OUTPATIENT)
Dept: REHABILITATION | Facility: HOSPITAL | Age: 60
End: 2020-08-14
Attending: ORTHOPAEDIC SURGERY
Payer: COMMERCIAL

## 2020-08-14 DIAGNOSIS — R53.1 WEAKNESS: ICD-10-CM

## 2020-08-14 DIAGNOSIS — M25.562 LEFT KNEE PAIN, UNSPECIFIED CHRONICITY: ICD-10-CM

## 2020-08-14 PROCEDURE — 97112 NEUROMUSCULAR REEDUCATION: CPT

## 2020-08-14 PROCEDURE — 97110 THERAPEUTIC EXERCISES: CPT

## 2020-08-14 NOTE — PROGRESS NOTES
"  Physical Therapy Daily Treatment Note     Name: Marielena Yang [place]  Clinic Number: [id number]    Therapy Diagnosis:   Encounter Diagnoses   Name Primary?    Left knee pain, unspecified chronicity     Weakness      Physician: Ran Thomas III, *    Visit Date: 6/19/2020  Physician: Danielle Cloud MD     Physician Orders: PT Eval and Treat  Medical Diagnosis from Referral: S86.812D (ICD-10-CM) - Patellar tendon rupture, left, subsequent encounter  Evaluation Date: 6/2/2020  Authorization Period Expiration: 12/31/2020  Plan of Care Expiration: 11/18/2020  Visit # / Visits authorized: 3/20  Total visits: 23    Time In: 0830  Time Out: 0915  Total Billable Time: 20 minutes     Precautions: Standard       Subjective     Pt reports: reports overall feeling very good- has less and less knee pain.     She was compliant with home exercise program.  Response to previous treatment: no sig change   Functional change: able to complete reciprocal pattern with stairs.     Pain: 2/10  Location: left knee      Objective     Daily Measurements: Sit to stand: mild increase in trunk flexion and hip hinge, mild weight shift R.   Step Ups: able to complete 8" step 5x no difficulty R/L; compensatory pattern with 12" step   Y balance dec knee flexion angle L 50% of the distance compared to R LE       Daily Treatment     Marielena received therapeutic exercises to develop strength, endurance and ROM for 18 minutes including:  Bike completed for 10 min lvl 6 to increase ROM, endurance and decrease pain to improve tolerance to ADLs and age related activities.   DL shuttle 3 cords 3x10   SL shuttle 2 cords 4x8    Marielena participated in dynamic functional therapeutic activities to improve functional performance for 00  minutes, including:      Marielena participated in neuromuscular re-education activities to improve: Posture and motor control for 27 minutes. The following activities were included:  Iso hold against wall 45 deg squat " "[id number]  Iso knee flex red ball [id number]  Stick assisted Y balance 3x5 ea  SL balance 2x30 sec L    Home Exercises and Patient Education Provided     Education provided:   - Cont to complete exercises at home     Written Home Exercises Provided: Patient instructed to cont prior HEP.  Exercises were reviewed and Marielena was able to demonstrate them prior to the end of the session.  Marielena demonstrated good  understanding of the education provided.     See EMR under patient instructions for exercises given.     Assessment   The pt demo'd improvement in postural control with y balance and single leg stance- required cueing to improve femoral ADD/I with fair carryover. Cont to focus on improving dynamic postural control and LE strength.      Marielena is progressing well towards her goals.     Pt will continue to benefit from skilled outpatient physical therapy to address the deficits listed in the problem list box on initial evaluation, provide pt/family education and to maximize pt's level of independence in the home and community environment. Extender used in session: Kate Iverson LAT, ATC    Pt prognosis is Good.     Pt's spiritual, cultural and educational needs considered and pt agreeable to plan of care and goals.    Anticipated barriers to physical therapy: None    Goals:  Short Term Goals: 2-4 weeks- MET  1. Pt will be compliant with HEP 50% of prescribed amount.   2. The pt to demo improvement in Tolerance to single leg stance to exceed 20 seconds without nee for UE use to remain on single leg   3.  The pt to demo improvement in MMT by 1/3 grade to inc tolerance to ADls and IADLs      Long Term Goals: 24 weeks   1. Pt will be compliant with % of prescribed amount. MET  2. The pt to demo tolerance and no compensation for step ups 12" 5x without pain in knee and without need for UE support progressing not met   3. The pt to demo tolerance to squatting without pain to improve tolerance to getting up and " down out of a chair- progressing not met   4. The pt will report full participation in ADLs and IADLs without restrictions related to L knee .  Progressing not met.     Plan     Cont to prog as damien.     Elizabeth Rivers, PT , DPT, SCS, FAAOMPT

## 2020-08-21 ENCOUNTER — CLINICAL SUPPORT (OUTPATIENT)
Dept: REHABILITATION | Facility: HOSPITAL | Age: 60
End: 2020-08-21
Attending: ORTHOPAEDIC SURGERY
Payer: COMMERCIAL

## 2020-08-21 DIAGNOSIS — R53.1 WEAKNESS: ICD-10-CM

## 2020-08-21 DIAGNOSIS — M25.562 LEFT KNEE PAIN, UNSPECIFIED CHRONICITY: ICD-10-CM

## 2020-08-21 PROCEDURE — 97535 SELF CARE MNGMENT TRAINING: CPT

## 2020-08-21 PROCEDURE — 97110 THERAPEUTIC EXERCISES: CPT

## 2020-08-21 PROCEDURE — 97112 NEUROMUSCULAR REEDUCATION: CPT

## 2020-08-21 NOTE — PROGRESS NOTES
"  Physical Therapy Daily Treatment Note     Name: Marielena Yang [place]  Clinic Number: [id number]    Therapy Diagnosis:   Encounter Diagnoses   Name Primary?    Left knee pain, unspecified chronicity     Weakness      Physician: Ran Thomas III, *    Visit Date: 6/19/2020  Physician: Danielle Cloud MD     Physician Orders: PT Eval and Treat  Medical Diagnosis from Referral: S86.812D (ICD-10-CM) - Patellar tendon rupture, left, subsequent encounter  Evaluation Date: 6/2/2020  Authorization Period Expiration: 12/31/2020  Plan of Care Expiration: 11/18/2020  Visit # / Visits authorized: 4/20  Total visits: 24    Time In: 1040  Time Out: 1130  Total Billable Time: 45 minutes     Precautions: Standard       Subjective     Pt reports: reports overall feeling very good- has less pain every day. Has a f/u anish MD in a week and comign to PT prior to- might be her last visit since she feels a lot better.     She was compliant with home exercise program.  Response to previous treatment: no sig change   Functional change: able to complete reciprocal pattern with stairs.     Pain: 2/10  Location: left knee      Objective     Daily Measurements: Sit to stand: mild increase in trunk flexion and hip hinge, mild weight shift R.   Step Ups: able to complete 8" step 5x no difficulty R/L; compensatory pattern with 12" step   Y balance requires HHA but overall looks good and symmetrical       Daily Treatment     Marielena received therapeutic exercises to develop strength, endurance and ROM for 35 minutes including:  Bike completed for 10 min lvl 6.5 to increase ROM, endurance and decrease pain to improve tolerance to ADLs and age related activities.   DL shuttle 3 cords 3x10   SL shuttle 3 cords 4x8 R, 4x5 L  Assisted [place] squat 3x3 ea    Marielena participated in neuromuscular re-education activities to improve: Posture and motor control for 10 minutes. The following activities were included:  Stick assisted Y balance x5 ea  Calf " "Raise DL 20x; SL 10x ea 3 rounds     Home Exercises and Patient Education Provided     Education provided:   - Cont to complete exercises at home     Written Home Exercises Provided: Patient instructed to cont prior HEP.  Exercises were reviewed and Marielena was able to demonstrate them prior to the end of the session.  Marielena demonstrated good  understanding of the education provided.     See EMR under patient instructions for exercises given.     Assessment   The pt was appropriately challenged by Tamazight split squats with limiting factor being the knee flexion of back leg due to ROM deficits and difficulty with compressive forces to quad tendon. The patient was advised to cont with HEP and that the next visit could be her last due to her sig improvement in function, tolerance to activity and compliance with HEP.   Marielena is progressing well towards her goals.     Pt will continue to benefit from skilled outpatient physical therapy to address the deficits listed in the problem list box on initial evaluation, provide pt/family education and to maximize pt's level of independence in the home and community environment. Extender used in session: Kate Iverson LAT, ATC    Pt prognosis is Good.     Pt's spiritual, cultural and educational needs considered and pt agreeable to plan of care and goals.    Anticipated barriers to physical therapy: None    Goals:  Short Term Goals: 2-4 weeks- MET  1. Pt will be compliant with HEP 50% of prescribed amount.   2. The pt to demo improvement in Tolerance to single leg stance to exceed 20 seconds without nee for UE use to remain on single leg   3.  The pt to demo improvement in MMT by 1/3 grade to inc tolerance to ADls and IADLs      Long Term Goals: 24 weeks   1. Pt will be compliant with % of prescribed amount. MET  2. The pt to demo tolerance and no compensation for step ups 12" 5x without pain in knee and without need for UE support progressing not met   3. The pt " to demo tolerance to squatting without pain to improve tolerance to getting up and down out of a chair- progressing not met   4. The pt will report full participation in ADLs and IADLs without restrictions related to L knee .  Progressing not met.     Plan     Cont to prog as damien.     Elizabeth Rivers, PT , DPT, SCS, FAAOMPT

## 2020-08-28 ENCOUNTER — CLINICAL SUPPORT (OUTPATIENT)
Dept: REHABILITATION | Facility: HOSPITAL | Age: 60
End: 2020-08-28
Attending: ORTHOPAEDIC SURGERY
Payer: COMMERCIAL

## 2020-08-28 DIAGNOSIS — M25.562 LEFT KNEE PAIN, UNSPECIFIED CHRONICITY: ICD-10-CM

## 2020-08-28 DIAGNOSIS — R53.1 WEAKNESS: ICD-10-CM

## 2020-08-28 PROCEDURE — 97112 NEUROMUSCULAR REEDUCATION: CPT

## 2020-08-28 PROCEDURE — 97110 THERAPEUTIC EXERCISES: CPT

## 2020-08-28 NOTE — PROGRESS NOTES
"  Physical Therapy Daily Treatment Note     Name: Marielena Yang [place]  Clinic Number: [id number]    Therapy Diagnosis:   Encounter Diagnoses   Name Primary?    Left knee pain, unspecified chronicity     Weakness      Physician: Ran Thomas III, *    Visit Date: 6/19/2020  Physician: Danielle Cloud MD     Physician Orders: PT Eval and Treat  Medical Diagnosis from Referral: S86.812D (ICD-10-CM) - Patellar tendon rupture, left, subsequent encounter  Evaluation Date: 6/2/2020  Authorization Period Expiration: 12/31/2020  Plan of Care Expiration: 11/18/2020  Visit # / Visits authorized: 5/20  Total visits: 25    Time In: 0930  Time Out: 1015  Total Billable Time: 40 minutes     Precautions: Standard       Subjective     Pt reports: she overall is feeling really good about completing her exercises at home and ending PT per discussion with MD. The pt notes that she was sore in her quads after last tx session which was good and has since resolved.      She was compliant with home exercise program.  Response to previous treatment: no sig change   Functional change: able to complete reciprocal pattern with stairs.     Pain: 2/10  Location: left knee      Objective     Daily Measurements: Sit to stand: mild increase in trunk flexion and hip hinge, mild weight shift R.   Step Ups: able to complete 8" step 5x no difficulty R/L; compensatory pattern with 12" step   Y balance requires HHA but overall looks good and symmetrical       Daily Treatment     Marielena received therapeutic exercises to develop strength, endurance and ROM for 35 minutes including:  Bike completed for 10 min lvl 6.5 to increase ROM, endurance and decrease pain to improve tolerance to ADLs and age related activities.   DL shuttle 3 cords 3x12  SL shuttle 2 1/2 cords 4x8 R, 4x5 L  Assisted Tamazight squat 2x6 ea    Marielena participated in neuromuscular re-education activities to improve: Posture and motor control for 10 minutes. The following activities " "were included:  Stick assisted Y balance x5 ea    Home Exercises and Patient Education Provided     Education provided:   - Cont to complete exercises at home     Written Home Exercises Provided: Patient instructed to cont prior HEP.  Exercises were reviewed and Marielena was able to demonstrate them prior to the end of the session.  Marielena demonstrated good  understanding of the education provided.     See EMR under patient instructions for exercises given.     Assessment   The pt has been regularly attending PT since June to treat ms weakness and impaired posutral control related to L patella fx and ORIF from a 2019 injury. The pt has made signficiant progress in PT and has tolerated progressions well.   Marielena is progressing well towards her goals. At this time the patient is safe to d/c from skilled PT services and agrees to the plan of care.    Pt will continue to benefit from skilled outpatient physical therapy to address the deficits listed in the problem list box on initial evaluation, provide pt/family education and to maximize pt's level of independence in the home and community environment. Extender used in session: Kate WINSTON, ATC    Pt prognosis is Good.     Pt's spiritual, cultural and educational needs considered and pt agreeable to plan of care and goals.    Anticipated barriers to physical therapy: None    Goals:  Short Term Goals: 2-4 weeks- MET  1. Pt will be compliant with HEP 50% of prescribed amount.   2. The pt to demo improvement in Tolerance to single leg stance to exceed 20 seconds without nee for UE use to remain on single leg   3.  The pt to demo improvement in MMT by 1/3 grade to inc tolerance to ADls and IADLs      Long Term Goals: 24 weeks   1. Pt will be compliant with % of prescribed amount. MET  2. The pt to demo tolerance and no compensation for step ups 12" 5x without pain in knee and without need for UE support Met  3. The pt to demo tolerance to squatting " without pain to improve tolerance to getting up and down out of a chair-  met   4. The pt will report full participation in ADLs and IADLs without restrictions related to L knee .   met.     Plan   D/c from skilled PT services     Elizabeth Rivers, PT , DPT, SCS, FAAOMPT

## 2020-09-01 ENCOUNTER — PATIENT OUTREACH (OUTPATIENT)
Dept: ADMINISTRATIVE | Facility: OTHER | Age: 60
End: 2020-09-01

## 2020-09-02 ENCOUNTER — OFFICE VISIT (OUTPATIENT)
Dept: SPORTS MEDICINE | Facility: CLINIC | Age: 60
End: 2020-09-02
Payer: COMMERCIAL

## 2020-09-02 VITALS
SYSTOLIC BLOOD PRESSURE: 143 MMHG | WEIGHT: 188 LBS | HEIGHT: 64 IN | HEART RATE: 69 BPM | DIASTOLIC BLOOD PRESSURE: 74 MMHG | BODY MASS INDEX: 32.1 KG/M2

## 2020-09-02 DIAGNOSIS — S82.032D CLOSED DISPLACED TRANSVERSE FRACTURE OF LEFT PATELLA WITH ROUTINE HEALING, SUBSEQUENT ENCOUNTER: Primary | ICD-10-CM

## 2020-09-02 PROCEDURE — 3077F PR MOST RECENT SYSTOLIC BLOOD PRESSURE >= 140 MM HG: ICD-10-PCS | Mod: CPTII,S$GLB,, | Performed by: ORTHOPAEDIC SURGERY

## 2020-09-02 PROCEDURE — 3078F DIAST BP <80 MM HG: CPT | Mod: CPTII,S$GLB,, | Performed by: ORTHOPAEDIC SURGERY

## 2020-09-02 PROCEDURE — 3077F SYST BP >= 140 MM HG: CPT | Mod: CPTII,S$GLB,, | Performed by: ORTHOPAEDIC SURGERY

## 2020-09-02 PROCEDURE — 99214 OFFICE O/P EST MOD 30 MIN: CPT | Mod: S$GLB,,, | Performed by: ORTHOPAEDIC SURGERY

## 2020-09-02 PROCEDURE — 3008F BODY MASS INDEX DOCD: CPT | Mod: CPTII,S$GLB,, | Performed by: ORTHOPAEDIC SURGERY

## 2020-09-02 PROCEDURE — 99999 PR PBB SHADOW E&M-EST. PATIENT-LVL III: CPT | Mod: PBBFAC,,, | Performed by: ORTHOPAEDIC SURGERY

## 2020-09-02 PROCEDURE — 3008F PR BODY MASS INDEX (BMI) DOCUMENTED: ICD-10-PCS | Mod: CPTII,S$GLB,, | Performed by: ORTHOPAEDIC SURGERY

## 2020-09-02 PROCEDURE — 99214 PR OFFICE/OUTPT VISIT, EST, LEVL IV, 30-39 MIN: ICD-10-PCS | Mod: S$GLB,,, | Performed by: ORTHOPAEDIC SURGERY

## 2020-09-02 PROCEDURE — 3078F PR MOST RECENT DIASTOLIC BLOOD PRESSURE < 80 MM HG: ICD-10-PCS | Mod: CPTII,S$GLB,, | Performed by: ORTHOPAEDIC SURGERY

## 2020-09-02 PROCEDURE — 99999 PR PBB SHADOW E&M-EST. PATIENT-LVL III: ICD-10-PCS | Mod: PBBFAC,,, | Performed by: ORTHOPAEDIC SURGERY

## 2020-09-02 NOTE — PROGRESS NOTES
Care Everywhere: updated  Immunization: updated  Health Maintenance: updated  Media Review: reviewed for outside mammogram and colon cancer report  Legacy Review:   Order placed:   Upcoming appts:   Mammogram scheduling ticket sent to patient's portal

## 2020-09-02 NOTE — PROGRESS NOTES
"CC: Left knee pain    HISTORY OF PRESENT ILLNESS:   Pt is here today for follow up of knee surgery.      Patient was attending physical therapy at the [facility] location, working with [facility]. She has not been to physical therapy due to the COVID 19 pandemic  She was provided a HEP that she has been performing, she has also been using ankle weights     She notes that she is doing well and that her knee will get fatigued at the end of the day     Doing well now, exercising well    SANE preop 0  SANE 100    DATE OF PROCEDURE: 9/24/2019   OPERATION:   1.  Partial inferior pole patellectomy of the left patella with patellar tendon advancement/reconstruction CPT 78489 - 22 modifier        Review of Systems   Constitution: Negative. Negative for chills, fever and night sweats.   HENT: Negative for congestion and headaches.    Eyes: Negative for blurred vision, left vision loss and right vision loss.   Cardiovascular: Negative for chest pain and syncope.   Respiratory: Negative for cough and shortness of breath.    Endocrine: Negative for polydipsia, polyphagia and polyuria.   Hematologic/Lymphatic: Negative for bleeding problem. Does not bruise/bleed easily.   Skin: Negative for dry skin, itching and rash.   Musculoskeletal: Negative for falls and muscle weakness.   Gastrointestinal: Negative for abdominal pain and bowel incontinence.   Genitourinary: Negative for bladder incontinence and nocturia.   Neurological: Negative for disturbances in coordination, loss of balance and seizures.   Psychiatric/Behavioral: Negative for depression. The patient does not have insomnia.    Allergic/Immunologic: Negative for hives and persistent infections.         PAST MEDICAL HISTORY:   Past Medical History:   Diagnosis Date    Anxiety and depression 1/29/2014    Colon polyps     Glaucoma     Hyperlipidemia     Hypertension     pt states " i don't have high blood pressure"    Keloid cicatrix     Obesity (BMI 30.0-34.9)  "     PAST SURGICAL HISTORY:   Past Surgical History:   Procedure Laterality Date     SECTION      x2    COLONOSCOPY N/A 2016    Procedure: COLONOSCOPY;  Surgeon: SHANNON Nj MD;  Location: [address]);  Service: Endoscopy;  Laterality: N/A;    KNEE SURGERY Left 2019    PATELLAR TENDON REPAIR Left 2019    Procedure: REPAIR/RECONSTRUCTION TENDON, PATELLAR;  Surgeon: Danielle Cloud MD;  Location: [facility];  Service: Orthopedics;  Laterality: Left;     FAMILY HISTORY:   Family History   Problem Relation Age of Onset    Dementia Father     Breast cancer Sister     Cancer Paternal Grandmother     Cancer Brother     Glaucoma Maternal Uncle     Colon cancer Neg Hx     Ovarian cancer Neg Hx     Blindness Neg Hx     Macular degeneration Neg Hx     Retinal detachment Neg Hx      SOCIAL HISTORY:   Social History     Socioeconomic History    Marital status:      Spouse name: Not on file    Number of children: Not on file    Years of education: Not on file    Highest education level: Not on file   Occupational History    Occupation: Manager     Employer: [employer]   Social Needs    Financial resource strain: Not on file    Food insecurity     Worry: Not on file     Inability: Not on file    Transportation needs     Medical: No     Non-medical: No   Tobacco Use    Smoking status: Former Smoker    Smokeless tobacco: Never Used   Substance and Sexual Activity    Alcohol use: Yes     Frequency: 2-3 times a week     Drinks per session: 1 or 2     Comment: few times a week     Drug use: No    Sexual activity: Yes     Partners: Male     Birth control/protection: Post-menopausal   Lifestyle    Physical activity     Days per week: Not on file     Minutes per session: Not on file    Stress: Only a little   Relationships    Social connections     Talks on phone: Twice a week     Gets together: Once a week     Attends Tenriism service: Not on file     Active member of  "club or organization: Not on file     Attends meetings of clubs or organizations: Not on file     Relationship status:    Other Topics Concern    Are you pregnant or think you may be? No    Breast-feeding No   Social History Narrative    Not on file       MEDICATIONS:   Current Outpatient Medications:     aspirin (ECOTRIN) 81 MG EC tablet, Take 81 mg by mouth once daily., Disp: , Rfl:     CALCIUM CARBONATE/VITAMIN D3 (VITAMIN D-3 ORAL), Take 1,000 mg by mouth once daily., Disp: , Rfl:     citalopram (CELEXA) 40 MG tablet, Take 1 tablet (40 mg total) by mouth once daily., Disp: 90 tablet, Rfl: 4    fish oil-omega-3 fatty acids 300-1,000 mg capsule, Take 2 g by mouth once daily., Disp: , Rfl:     hydrOXYzine (ATARAX) 50 MG tablet, Take 1 tablet (50 mg total) by mouth nightly as needed (insomnia)., Disp: 30 tablet, Rfl: 12    LORazepam (ATIVAN) 1 MG tablet, TAKE 1 TABLET (1 MG TOTAL) BY MOUTH EVERY EVENING., Disp: 30 tablet, Rfl: 5    simvastatin (ZOCOR) 40 MG tablet, Take 1 tablet (40 mg total) by mouth once daily., Disp: 90 tablet, Rfl: 4    ascorbic acid, vitamin C, (VITAMIN C) 100 MG tablet, Take 100 mg by mouth 2 (two) times daily. , Disp: , Rfl:     hydroxyzine HCL (ATARAX) 25 MG tablet, TAKE 1/2 TO 1 TABLET BY MOUTH NIGHTLY AS NEEDED FOR INSOMNIA, Disp: 30 tablet, Rfl: 1    ondansetron (ZOFRAN-ODT) 4 MG TbDL, Take 1 tablet (4 mg total) by mouth every 6 (six) hours as needed., Disp: 18 tablet, Rfl: 0    urea 50 % Crea, Apply 1 application topically once daily. Apply to affected area, Disp: 255 g, Rfl: 3  ALLERGIES:   Review of patient's allergies indicates:   Allergen Reactions    Sulfa (sulfonamide antibiotics)        VITAL SIGNS: BP (!) 143/74   Pulse 69   Ht 5' 4" (1.626 m)   Wt 85.3 kg (188 lb)   BMI 32.27 kg/m²                                                                            PHYSICAL EXAMINATION:     Incision sites healed well  No evidence of any erythema, infection or " induration    Range of motion 0-135 active. (right knee: 140)  No effusion  2+ DP pulse  No swelling, no calf tenderness  - Hellen's sign  + medial joint line tendernes  Min quad atrophy    goodmobility of patella                                                                             ASSESSMENT:                                                                                                                                               1. Status post above, doing well.                                                                                                                           PLAN:                                                                                                                                                     1. Continue with PT and HEP.   2. Emphasized quad function.  3. I have discussed return to activity in detail.   4.Patient will see us in PRN  5. All questions were answered and patient should contact us if she  has any questions or concerns in the interim.

## 2020-09-03 ENCOUNTER — HOSPITAL ENCOUNTER (OUTPATIENT)
Dept: RADIOLOGY | Facility: HOSPITAL | Age: 60
Discharge: HOME | End: 2020-09-03
Attending: INTERNAL MEDICINE
Payer: COMMERCIAL

## 2020-09-03 DIAGNOSIS — Z12.39 BREAST CANCER SCREENING: ICD-10-CM

## 2020-09-03 PROCEDURE — 77067 MAMMO DIGITAL SCREENING BILAT WITH TOMOSYNTHESIS_CAD: ICD-10-PCS | Mod: 26,,, | Performed by: RADIOLOGY

## 2020-09-03 PROCEDURE — 77067 SCR MAMMO BI INCL CAD: CPT | Mod: TC

## 2020-09-03 PROCEDURE — 77063 MAMMO DIGITAL SCREENING BILAT WITH TOMOSYNTHESIS_CAD: ICD-10-PCS | Mod: 26,,, | Performed by: RADIOLOGY

## 2020-09-03 PROCEDURE — 77067 SCR MAMMO BI INCL CAD: CPT | Mod: 26,,, | Performed by: RADIOLOGY

## 2020-09-03 PROCEDURE — 77063 BREAST TOMOSYNTHESIS BI: CPT | Mod: 26,,, | Performed by: RADIOLOGY

## 2020-10-19 ENCOUNTER — TELEPHONE (OUTPATIENT)
Dept: INTERNAL MEDICINE | Facility: CLINIC | Age: 60
End: 2020-10-19

## 2020-10-19 NOTE — TELEPHONE ENCOUNTER
----- Message from Erma Mcfadden sent at 10/19/2020  1:06 PM CDT -----  Regarding: my chart request  Appointment Request From: Marielena Tracy    With Provider: Delroy Gregory MD [[facility]]    Preferred Date Range: 10/19/2020 - 10/23/2020    Preferred Times: Any Time    Reason for visit: White patches on tongue.  They don't scratch off or cause discomfort.    Comments:  Treatment if needed for white patches

## 2020-10-20 ENCOUNTER — OFFICE VISIT (OUTPATIENT)
Dept: INTERNAL MEDICINE | Facility: CLINIC | Age: 60
End: 2020-10-20
Payer: COMMERCIAL

## 2020-10-20 VITALS
DIASTOLIC BLOOD PRESSURE: 90 MMHG | WEIGHT: 181.44 LBS | HEART RATE: 76 BPM | BODY MASS INDEX: 31.14 KG/M2 | SYSTOLIC BLOOD PRESSURE: 138 MMHG | OXYGEN SATURATION: 99 %

## 2020-10-20 DIAGNOSIS — K13.70 ORAL LESION: ICD-10-CM

## 2020-10-20 DIAGNOSIS — K14.8 TONGUE LESION: Primary | ICD-10-CM

## 2020-10-20 DIAGNOSIS — R03.0 ELEVATED BLOOD PRESSURE READING: ICD-10-CM

## 2020-10-20 PROCEDURE — 3075F SYST BP GE 130 - 139MM HG: CPT | Mod: CPTII,S$GLB,, | Performed by: PHYSICIAN ASSISTANT

## 2020-10-20 PROCEDURE — 3080F DIAST BP >= 90 MM HG: CPT | Mod: CPTII,S$GLB,, | Performed by: PHYSICIAN ASSISTANT

## 2020-10-20 PROCEDURE — 99214 OFFICE O/P EST MOD 30 MIN: CPT | Mod: S$GLB,,, | Performed by: PHYSICIAN ASSISTANT

## 2020-10-20 PROCEDURE — 3080F PR MOST RECENT DIASTOLIC BLOOD PRESSURE >= 90 MM HG: ICD-10-PCS | Mod: CPTII,S$GLB,, | Performed by: PHYSICIAN ASSISTANT

## 2020-10-20 PROCEDURE — 3075F PR MOST RECENT SYSTOLIC BLOOD PRESS GE 130-139MM HG: ICD-10-PCS | Mod: CPTII,S$GLB,, | Performed by: PHYSICIAN ASSISTANT

## 2020-10-20 PROCEDURE — 99214 PR OFFICE/OUTPT VISIT, EST, LEVL IV, 30-39 MIN: ICD-10-PCS | Mod: S$GLB,,, | Performed by: PHYSICIAN ASSISTANT

## 2020-10-20 PROCEDURE — 3008F PR BODY MASS INDEX (BMI) DOCUMENTED: ICD-10-PCS | Mod: CPTII,S$GLB,, | Performed by: PHYSICIAN ASSISTANT

## 2020-10-20 PROCEDURE — 3008F BODY MASS INDEX DOCD: CPT | Mod: CPTII,S$GLB,, | Performed by: PHYSICIAN ASSISTANT

## 2020-10-20 PROCEDURE — 99999 PR PBB SHADOW E&M-EST. PATIENT-LVL IV: CPT | Mod: PBBFAC,,, | Performed by: PHYSICIAN ASSISTANT

## 2020-10-20 PROCEDURE — 99999 PR PBB SHADOW E&M-EST. PATIENT-LVL IV: ICD-10-PCS | Mod: PBBFAC,,, | Performed by: PHYSICIAN ASSISTANT

## 2020-10-20 NOTE — PROGRESS NOTES
"Subjective:       Patient ID: Marielena Tracy is a 59 y.o. female.    Chief Complaint: tongue spots    HPI     Established pt of Delroy Gregory MD (new to me)      Here with concerns of white patches on tongue and bilateral inner cheeks. Notice several weeks ago. Thought it may have been due to a whitening toothpaste which she stopped usings but area persist. No pain or ulceration. No taste changes.  Not able to scratch off the white spots. Tried warm salt water rinse, peroxide, and listerine as well. No hx of dental or gum issues. Former smoker in quit in 2005. She also has a history of cutaneus lichen planus(seen by Derm, treated with MTX/prednisone in the past).     Past Medical History:   Diagnosis Date    Anxiety and depression 1/29/2014    Colon polyps     Glaucoma     Hyperlipidemia     Hypertension     pt states " i don't have high blood pressure"    Keloid cicatrix     Obesity (BMI 30.0-34.9)      Social History     Tobacco Use    Smoking status: Former Smoker    Smokeless tobacco: Never Used   Substance Use Topics    Alcohol use: Yes     Frequency: 2-3 times a week     Drinks per session: 1 or 2     Comment: few times a week     Drug use: No     Review of patient's allergies indicates:   Allergen Reactions    Sulfa (sulfonamide antibiotics)            Review of Systems   Constitutional: Negative for chills, fever and unexpected weight change.   HENT:        As per HPI   Respiratory: Negative for cough and shortness of breath.    Cardiovascular: Negative for chest pain and leg swelling.   Gastrointestinal: Negative for abdominal pain, nausea and vomiting.   Integumentary:  Negative for rash.   Neurological: Negative for weakness and headaches.         Objective: BP (!) 142/90   Pulse 76   Wt 82.3 kg (181 lb 7 oz)   SpO2 99%   BMI 31.14 kg/m²         Physical Exam  Vitals signs reviewed.   Constitutional:       General: She is not in acute distress.     Appearance: She is " well-developed.   HENT:      Head: Normocephalic and atraumatic.      Mouth/Throat:      Mouth: Mucous membranes are moist. Oral lesions present.      Tongue: Lesions present.      Palate: No lesions.      Pharynx: No oropharyngeal exudate or posterior oropharyngeal erythema.      Comments: scattered white smooth white plaques to dorsal tongue   bilateral buccal mucosa with reticular lacy white lesions  Cardiovascular:      Rate and Rhythm: Normal rate and regular rhythm.      Heart sounds: No murmur. No friction rub.   Pulmonary:      Effort: Pulmonary effort is normal.      Breath sounds: Normal breath sounds. No wheezing or rales.   Abdominal:      General: Bowel sounds are normal.      Palpations: Abdomen is soft.      Tenderness: There is no abdominal tenderness.   Lymphadenopathy:      Cervical: No cervical adenopathy.   Skin:     General: Skin is warm and dry.      Findings: No rash.   Neurological:      Mental Status: She is alert.         Assessment:       1. Tongue lesion    2. Oral lesion    3. Elevated blood pressure reading        Plan:         Marielena was seen today for tongue spots.    Diagnoses and all orders for this visit:    Tongue lesion  Oral lesion  Suspect oral lichen planus vs other  Will have ENT eval  -     Ambulatory referral/consult to ENT; Future      Elevated blood pressure reading  Slight elevation of BP reading today in clinic  Pt attributes to anxiety of the oral lesions  Advised patient to monitor keep bp log  She plans to schedule f/u next with Dr. Gregory for chronic med refills (lorazepam)  Encouraged to brinh BP log to appt.     Ana María Ronquillo PA-C

## 2020-10-23 ENCOUNTER — OFFICE VISIT (OUTPATIENT)
Dept: OTOLARYNGOLOGY | Facility: CLINIC | Age: 60
End: 2020-10-23
Payer: COMMERCIAL

## 2020-10-23 VITALS
DIASTOLIC BLOOD PRESSURE: 76 MMHG | BODY MASS INDEX: 31.75 KG/M2 | WEIGHT: 184.94 LBS | HEART RATE: 79 BPM | SYSTOLIC BLOOD PRESSURE: 139 MMHG

## 2020-10-23 DIAGNOSIS — K14.8 TONGUE LESION: ICD-10-CM

## 2020-10-23 DIAGNOSIS — K13.70 ORAL LESION: ICD-10-CM

## 2020-10-23 PROCEDURE — 99999 PR PBB SHADOW E&M-EST. PATIENT-LVL III: ICD-10-PCS | Mod: PBBFAC,,, | Performed by: OTOLARYNGOLOGY

## 2020-10-23 PROCEDURE — 99999 PR PBB SHADOW E&M-EST. PATIENT-LVL III: CPT | Mod: PBBFAC,,, | Performed by: OTOLARYNGOLOGY

## 2020-10-23 PROCEDURE — 99244 OFF/OP CNSLTJ NEW/EST MOD 40: CPT | Mod: S$GLB,,, | Performed by: OTOLARYNGOLOGY

## 2020-10-23 PROCEDURE — 99244 PR OFFICE CONSULTATION,LEVEL IV: ICD-10-PCS | Mod: S$GLB,,, | Performed by: OTOLARYNGOLOGY

## 2020-10-23 NOTE — PROGRESS NOTES
"Head and Neck Surgery Consult    Seen in consultation from MARGARETTE Merrill    HPI: Marielena Tracy is a 60 y.o. female presenting with oral leukoplakia of ~3 weeks duration. Former smoker, quit 10+ years ago. This has been asymptomatic. She has a history of lichen planus on her arms and legs that was treated with 5FU in the past successfully. She has been treating these oral lesions with Biotene gum health without relief, and she has been extremely stressed by these lesions.     Past Medical History:   Diagnosis Date    Anxiety and depression 2014    Colon polyps     Glaucoma     Hyperlipidemia     Hypertension     pt states " i don't have high blood pressure"    Keloid cicatrix     Obesity (BMI 30.0-34.9)        Past Surgical History:   Procedure Laterality Date     SECTION      x2    COLONOSCOPY N/A 2016    Procedure: COLONOSCOPY;  Surgeon: SHANNON Nj MD;  Location: [address]);  Service: Endoscopy;  Laterality: N/A;    KNEE SURGERY Left 2019    PATELLAR TENDON REPAIR Left 2019    Procedure: REPAIR/RECONSTRUCTION TENDON, PATELLAR;  Surgeon: Danielle Cloud MD;  Location: [facility];  Service: Orthopedics;  Laterality: Left;         Current Outpatient Medications:     ascorbic acid, vitamin C, (VITAMIN C) 100 MG tablet, Take 100 mg by mouth 2 (two) times daily. , Disp: , Rfl:     aspirin (ECOTRIN) 81 MG EC tablet, Take 81 mg by mouth once daily., Disp: , Rfl:     CALCIUM CARBONATE/VITAMIN D3 (VITAMIN D-3 ORAL), Take 1,000 mg by mouth once daily., Disp: , Rfl:     citalopram (CELEXA) 40 MG tablet, Take 1 tablet (40 mg total) by mouth once daily., Disp: 90 tablet, Rfl: 4    fish oil-omega-3 fatty acids 300-1,000 mg capsule, Take 2 g by mouth once daily., Disp: , Rfl:     hydrOXYzine (ATARAX) 50 MG tablet, Take 1 tablet (50 mg total) by mouth nightly as needed (insomnia)., Disp: 30 tablet, Rfl: 12    LORazepam (ATIVAN) 1 MG tablet, TAKE 1 TABLET (1 MG " TOTAL) BY MOUTH EVERY EVENING., Disp: 30 tablet, Rfl: 5    simvastatin (ZOCOR) 40 MG tablet, Take 1 tablet (40 mg total) by mouth once daily., Disp: 90 tablet, Rfl: 4    urea 50 % Crea, Apply 1 application topically once daily. Apply to affected area, Disp: 255 g, Rfl: 3    Review of patient's allergies indicates:   Allergen Reactions    Sulfa (sulfonamide antibiotics)        Family History   Problem Relation Age of Onset    Dementia Father     Breast cancer Sister     Cancer Paternal Grandmother     Cancer Brother     Glaucoma Maternal Uncle     Colon cancer Neg Hx     Ovarian cancer Neg Hx     Blindness Neg Hx     Macular degeneration Neg Hx     Retinal detachment Neg Hx        Social History     Socioeconomic History    Marital status:      Spouse name: Not on file    Number of children: Not on file    Years of education: Not on file    Highest education level: Not on file   Occupational History    Occupation: Manager     Employer: [employer]   Social Needs    Financial resource strain: Not on file    Food insecurity     Worry: Not on file     Inability: Not on file    Transportation needs     Medical: No     Non-medical: No   Tobacco Use    Smoking status: Former Smoker    Smokeless tobacco: Never Used   Substance and Sexual Activity    Alcohol use: Yes     Frequency: 2-3 times a week     Drinks per session: 1 or 2     Comment: few times a week     Drug use: No    Sexual activity: Yes     Partners: Male     Birth control/protection: Post-menopausal   Lifestyle    Physical activity     Days per week: Not on file     Minutes per session: Not on file    Stress: Only a little   Relationships    Social connections     Talks on phone: Twice a week     Gets together: Once a week     Attends Restorationism service: Not on file     Active member of club or organization: Not on file     Attends meetings of clubs or organizations: Not on file     Relationship status:    Other Topics  Concern    Are you pregnant or think you may be? No    Breast-feeding No   Social History Narrative    Not on file       Review of Systems -  Constitutional: Denies having night sweats, constant fatigue, loss of appetite or recent substantial weight loss.  Eyes: Denies blurred vision or double vision.  Respiratory: Denies symptoms of shortness of breath, noisy breathing, hoarseness or chronic cough.  GI: Denies symptoms of heartburn, acid regurgitation, or the known presence of a hiatal hernia.  The remainder of a 10-point review of systems is negative    REVIEW OF RADIOLOGICAL FILMS AND RECORDS (PERSONALLY REVIEWED):  Noncontributory    REVIEW OF LABS (PERSONALLY REVIEWED)  Noncontributory    PHYSICAL EXAM:  Vitals - /76 (Patient Position: Sitting)   Pulse 79   Wt 83.9 kg (184 lb 15.5 oz)   BMI 31.75 kg/m²   Constitutional -      General Appearance: well developed, well nourished, without obvious deformities     Communication: speaks with a normal voice without hoarseness  Head & Face -     Overall: no obvious scars, lesions or masses     Parotid and submandibular glands: no masses or tenderness     Facial strength: normal and equal bilaterally  Eyes -      EOM intact  Ear, Nose, Mouth & Throat -     Ears: both left and right external auditory canals and TM's are normal, no external deformities     Nasal exam: mucosa is pink, septum is midline, visible turbinates are normal on anterior rhinoscopy     Mastication: teeth appear present     Oral Cavity and oropharynx: mucosa, hard and soft palates, tongue, posterior pharyngeal wall, lips and gums are without lesions except for lacy leukoplakia of B RMT/buccal mucosa and patchy leukoplakia of tongue all C/W lichen planus. Tonsils appear minimal  Respiratory:     Breathing unlabored, no stridor  Cardiovascular:     No JVD, capillary refill normal  Larynx: using the mirror for indirect laryngoscopy, the epiglottic, false cords, true cords, and pyriform  sinuses are without lesions and the true vocal cords move normally  Neck: appears symmetric, and on palpation is without masses   Endocrine:     Thyroid: no asymmetry, thyromegaly, or thyroid nodules on palpation  Lymphatic:     No cervical lymphadenopathy  Cranial Nerves:      II: Pupillary reflexes normal     III, IV, VI: EOM normal     V: 1,2,3: normal sensation     VII: Normal strength in all divisions     IX, X: Normal voice, palatal elevation and sensation     XI: Shoulder strength normal       XII: Tongue mobility normal  Psychiatric:     Appropriate affect    ASSESSMENT: lichen planus    PLAN: I will Rx magic mouthwash swish and spit. She already has F/U with her dentist arranged. If this is unsuccessful, I will refer to rheumatology.       Blair Parks

## 2020-10-23 NOTE — LETTER
October 23, 2020      Ana María Ronquillo PA-C  [address]  [facility] [address]           [id number]  [address]  [facility] [address]  Phone: [phone]  Fax: [phone]          Patient: Marielena Tracy   MR Number: [id number]   YOB: 1960   Date of Visit: 10/23/2020       Dear Ana María Ronquillo:    Thank you for referring Marielena Tracy to me for evaluation. Attached you will find relevant portions of my assessment and plan of care.    If you have questions, please do not hesitate to call me. I look forward to following Marielena Tracy along with you.    Sincerely,    Blair Parks MD    Enclosure  CC:  No Recipients    If you would like to receive this communication electronically, please contact [email or web address] or [phone] to request more information on [employer] access.    For providers and/or their staff who would like to refer a patient to [facility], please contact us through our one-stop-shop provider referral line, [facility], at [phone].    If you feel you have received this communication in error or would no longer like to receive these types of communications, please e-mail [email or web address]

## 2020-11-12 ENCOUNTER — PATIENT MESSAGE (OUTPATIENT)
Dept: PHARMACY | Facility: CLINIC | Age: 60
End: 2020-11-12

## 2020-11-12 ENCOUNTER — OFFICE VISIT (OUTPATIENT)
Dept: INTERNAL MEDICINE | Facility: CLINIC | Age: 60
End: 2020-11-12
Payer: COMMERCIAL

## 2020-11-12 ENCOUNTER — IMMUNIZATION (OUTPATIENT)
Dept: PHARMACY | Facility: CLINIC | Age: 60
End: 2020-11-12
Payer: COMMERCIAL

## 2020-11-12 VITALS
OXYGEN SATURATION: 98 % | DIASTOLIC BLOOD PRESSURE: 86 MMHG | WEIGHT: 183.63 LBS | HEART RATE: 69 BPM | BODY MASS INDEX: 31.35 KG/M2 | SYSTOLIC BLOOD PRESSURE: 128 MMHG | HEIGHT: 64 IN

## 2020-11-12 DIAGNOSIS — Z00.00 ROUTINE PHYSICAL EXAMINATION: Primary | ICD-10-CM

## 2020-11-12 DIAGNOSIS — E78.5 HYPERLIPIDEMIA, UNSPECIFIED HYPERLIPIDEMIA TYPE: ICD-10-CM

## 2020-11-12 DIAGNOSIS — I10 ESSENTIAL HYPERTENSION: ICD-10-CM

## 2020-11-12 DIAGNOSIS — F41.9 ANXIETY AND DEPRESSION: ICD-10-CM

## 2020-11-12 DIAGNOSIS — E55.9 VITAMIN D DEFICIENCY: ICD-10-CM

## 2020-11-12 DIAGNOSIS — F32.A ANXIETY AND DEPRESSION: ICD-10-CM

## 2020-11-12 PROCEDURE — 99396 PR PREVENTIVE VISIT,EST,40-64: ICD-10-PCS | Mod: S$GLB,,, | Performed by: INTERNAL MEDICINE

## 2020-11-12 PROCEDURE — 3079F PR MOST RECENT DIASTOLIC BLOOD PRESSURE 80-89 MM HG: ICD-10-PCS | Mod: CPTII,S$GLB,, | Performed by: INTERNAL MEDICINE

## 2020-11-12 PROCEDURE — 99999 PR PBB SHADOW E&M-EST. PATIENT-LVL IV: CPT | Mod: PBBFAC,,, | Performed by: INTERNAL MEDICINE

## 2020-11-12 PROCEDURE — 99999 PR PBB SHADOW E&M-EST. PATIENT-LVL IV: ICD-10-PCS | Mod: PBBFAC,,, | Performed by: INTERNAL MEDICINE

## 2020-11-12 PROCEDURE — 3074F SYST BP LT 130 MM HG: CPT | Mod: CPTII,S$GLB,, | Performed by: INTERNAL MEDICINE

## 2020-11-12 PROCEDURE — 99396 PREV VISIT EST AGE 40-64: CPT | Mod: S$GLB,,, | Performed by: INTERNAL MEDICINE

## 2020-11-12 PROCEDURE — 3074F PR MOST RECENT SYSTOLIC BLOOD PRESSURE < 130 MM HG: ICD-10-PCS | Mod: CPTII,S$GLB,, | Performed by: INTERNAL MEDICINE

## 2020-11-12 PROCEDURE — 1126F AMNT PAIN NOTED NONE PRSNT: CPT | Mod: S$GLB,,, | Performed by: INTERNAL MEDICINE

## 2020-11-12 PROCEDURE — 3079F DIAST BP 80-89 MM HG: CPT | Mod: CPTII,S$GLB,, | Performed by: INTERNAL MEDICINE

## 2020-11-12 PROCEDURE — 3008F BODY MASS INDEX DOCD: CPT | Mod: CPTII,S$GLB,, | Performed by: INTERNAL MEDICINE

## 2020-11-12 PROCEDURE — 3008F PR BODY MASS INDEX (BMI) DOCUMENTED: ICD-10-PCS | Mod: CPTII,S$GLB,, | Performed by: INTERNAL MEDICINE

## 2020-11-12 PROCEDURE — 1126F PR PAIN SEVERITY QUANTIFIED, NO PAIN PRESENT: ICD-10-PCS | Mod: S$GLB,,, | Performed by: INTERNAL MEDICINE

## 2020-11-12 RX ORDER — LORAZEPAM 1 MG/1
TABLET ORAL
Qty: 30 TABLET | Refills: 5 | Status: SHIPPED | OUTPATIENT
Start: 2020-11-12 | End: 2020-11-25

## 2020-11-12 NOTE — PROGRESS NOTES
Subjective:       Patient ID: Marielena Tracy is a 60 y.o. female.    Chief Complaint: Annual Exam    Patient comes in for annual exam and refill of anxiety meds.  She says she is doing well.  She is actually trying to taper off her sleeping pill.  She has recovered from her kneecap fracture surgery.  Would like to update some labs.  She would like to take the shingles vaccine.  She saw ENT for some mouth lesions that ended up being lichen planus.  She has had this intermittently on her hand in the past.  She took Acetretin years ago and had side effects.  She took methotrexate fairly successfully but then stopped it and now she is just treated it with topical steroids.  The ENT recommended Magic mouthwash.     Review of Systems   Constitutional: Negative for appetite change, chills and fever.   HENT: Negative for nosebleeds and sore throat.    Eyes: Negative for pain and visual disturbance.   Respiratory: Negative for cough, shortness of breath and wheezing.    Cardiovascular: Negative for chest pain and leg swelling.   Gastrointestinal: Negative for abdominal pain, constipation and diarrhea.   Endocrine: Negative for polyuria.   Genitourinary: Negative for difficulty urinating, hematuria and vaginal bleeding.   Musculoskeletal: Negative for arthralgias, back pain, gait problem and neck pain.   Integumentary:  Positive for rash. Negative for pallor.   Neurological: Negative for tremors, seizures and headaches.   Hematological: Does not bruise/bleed easily.   Psychiatric/Behavioral: Positive for sleep disturbance. Negative for dysphoric mood. The patient is nervous/anxious.          Objective:      Physical Exam  Constitutional:       General: She is not in acute distress.     Appearance: She is well-developed.   HENT:      Head: Normocephalic and atraumatic.      Right Ear: Tympanic membrane, ear canal and external ear normal.      Left Ear: Tympanic membrane, ear canal and external ear normal.       Mouth/Throat:      Pharynx: No oropharyngeal exudate or posterior oropharyngeal erythema.      Comments: Buccal lesions bilaterally  Eyes:      General: No scleral icterus.     Conjunctiva/sclera: Conjunctivae normal.      Pupils: Pupils are equal, round, and reactive to light.   Neck:      Musculoskeletal: Normal range of motion and neck supple.      Thyroid: No thyromegaly.   Cardiovascular:      Rate and Rhythm: Normal rate and regular rhythm.      Pulses: Normal pulses.      Heart sounds: No murmur.   Pulmonary:      Effort: Pulmonary effort is normal.      Breath sounds: Normal breath sounds. No wheezing.   Abdominal:      General: Bowel sounds are normal. There is no distension.      Palpations: Abdomen is soft.      Tenderness: There is no abdominal tenderness.   Musculoskeletal:         General: No tenderness or deformity (Well-healed left kneecap surgery).      Right lower leg: No edema.      Left lower leg: No edema.   Lymphadenopathy:      Cervical: No cervical adenopathy.   Skin:     Coloration: Skin is not jaundiced.      Findings: Rash present.   Neurological:      General: No focal deficit present.      Mental Status: She is alert and oriented to person, place, and time.   Psychiatric:         Mood and Affect: Mood normal.         Behavior: Behavior normal.         Assessment:       1. Routine physical examination    2. Hyperlipidemia, unspecified hyperlipidemia type    3. Anxiety and depression    4. Essential hypertension    5. Vitamin D deficiency        Plan:       Marielena was seen today for annual exam.    Diagnoses and all orders for this visit:    Routine physical examination  -     Lipid Panel; Future  -     CBC Auto Differential; Future  -     Comprehensive Metabolic Panel; Future  -     Vitamin D; Future    Hyperlipidemia, unspecified hyperlipidemia type  -     Lipid Panel; Future  -     CBC Auto Differential; Future  -     Comprehensive Metabolic Panel; Future  -     Vitamin D;  Future    Anxiety and depression  -     Lipid Panel; Future  -     CBC Auto Differential; Future  -     Comprehensive Metabolic Panel; Future  -     Vitamin D; Future    Essential hypertension  -     Lipid Panel; Future  -     CBC Auto Differential; Future  -     Comprehensive Metabolic Panel; Future  -     Vitamin D; Future    Vitamin D deficiency  -     Lipid Panel; Future  -     CBC Auto Differential; Future  -     Comprehensive Metabolic Panel; Future  -     Vitamin D; Future    Other orders  -     LORazepam (ATIVAN) 1 MG tablet; TAKE 1 TABLET (1 MG TOTAL) BY MOUTH EVERY EVENING.        follow-up in 6 months.  Review labs

## 2020-11-16 ENCOUNTER — PATIENT MESSAGE (OUTPATIENT)
Dept: INTERNAL MEDICINE | Facility: CLINIC | Age: 60
End: 2020-11-16

## 2021-01-13 ENCOUNTER — IMMUNIZATION (OUTPATIENT)
Dept: PHARMACY | Facility: CLINIC | Age: 61
End: 2021-01-13
Payer: COMMERCIAL

## 2021-03-24 ENCOUNTER — PATIENT MESSAGE (OUTPATIENT)
Dept: INTERNAL MEDICINE | Facility: CLINIC | Age: 61
End: 2021-03-24

## 2021-03-24 RX ORDER — LORAZEPAM 1 MG/1
TABLET ORAL
Qty: 30 TABLET | Refills: 1 | Status: SHIPPED | OUTPATIENT
Start: 2021-03-24 | End: 2021-05-05 | Stop reason: SDUPTHER

## 2021-04-16 ENCOUNTER — PATIENT MESSAGE (OUTPATIENT)
Dept: RESEARCH | Facility: HOSPITAL | Age: 61
End: 2021-04-16

## 2021-05-04 ENCOUNTER — PATIENT MESSAGE (OUTPATIENT)
Dept: INTERNAL MEDICINE | Facility: CLINIC | Age: 61
End: 2021-05-04

## 2021-05-05 ENCOUNTER — OFFICE VISIT (OUTPATIENT)
Dept: INTERNAL MEDICINE | Facility: CLINIC | Age: 61
End: 2021-05-05
Payer: COMMERCIAL

## 2021-05-05 VITALS
OXYGEN SATURATION: 99 % | BODY MASS INDEX: 30.14 KG/M2 | HEART RATE: 67 BPM | HEIGHT: 64 IN | WEIGHT: 176.56 LBS | DIASTOLIC BLOOD PRESSURE: 78 MMHG | SYSTOLIC BLOOD PRESSURE: 122 MMHG

## 2021-05-05 DIAGNOSIS — F41.9 ANXIETY: Primary | ICD-10-CM

## 2021-05-05 DIAGNOSIS — E78.5 HYPERLIPIDEMIA, UNSPECIFIED HYPERLIPIDEMIA TYPE: ICD-10-CM

## 2021-05-05 DIAGNOSIS — G47.00 INSOMNIA, UNSPECIFIED TYPE: ICD-10-CM

## 2021-05-05 PROCEDURE — 99214 PR OFFICE/OUTPT VISIT, EST, LEVL IV, 30-39 MIN: ICD-10-PCS | Mod: S$GLB,,, | Performed by: PHYSICIAN ASSISTANT

## 2021-05-05 PROCEDURE — 99999 PR PBB SHADOW E&M-EST. PATIENT-LVL IV: CPT | Mod: PBBFAC,,, | Performed by: PHYSICIAN ASSISTANT

## 2021-05-05 PROCEDURE — 1126F PR PAIN SEVERITY QUANTIFIED, NO PAIN PRESENT: ICD-10-PCS | Mod: S$GLB,,, | Performed by: PHYSICIAN ASSISTANT

## 2021-05-05 PROCEDURE — 3008F BODY MASS INDEX DOCD: CPT | Mod: CPTII,S$GLB,, | Performed by: PHYSICIAN ASSISTANT

## 2021-05-05 PROCEDURE — 99214 OFFICE O/P EST MOD 30 MIN: CPT | Mod: S$GLB,,, | Performed by: PHYSICIAN ASSISTANT

## 2021-05-05 PROCEDURE — 1126F AMNT PAIN NOTED NONE PRSNT: CPT | Mod: S$GLB,,, | Performed by: PHYSICIAN ASSISTANT

## 2021-05-05 PROCEDURE — 3008F PR BODY MASS INDEX (BMI) DOCUMENTED: ICD-10-PCS | Mod: CPTII,S$GLB,, | Performed by: PHYSICIAN ASSISTANT

## 2021-05-05 PROCEDURE — 99999 PR PBB SHADOW E&M-EST. PATIENT-LVL IV: ICD-10-PCS | Mod: PBBFAC,,, | Performed by: PHYSICIAN ASSISTANT

## 2021-05-05 RX ORDER — LORAZEPAM 1 MG/1
TABLET ORAL
Qty: 30 TABLET | Refills: 5 | Status: SHIPPED | OUTPATIENT
Start: 2021-05-05 | End: 2021-11-19 | Stop reason: SDUPTHER

## 2021-06-27 DIAGNOSIS — G47.00 INSOMNIA, UNSPECIFIED TYPE: ICD-10-CM

## 2021-06-29 RX ORDER — SIMVASTATIN 40 MG/1
TABLET, FILM COATED ORAL
Qty: 90 TABLET | Refills: 1 | Status: SHIPPED | OUTPATIENT
Start: 2021-06-29 | End: 2021-10-27

## 2021-06-29 RX ORDER — CITALOPRAM 40 MG/1
TABLET, FILM COATED ORAL
Qty: 90 TABLET | Refills: 1 | Status: SHIPPED | OUTPATIENT
Start: 2021-06-29 | End: 2021-11-19 | Stop reason: SDUPTHER

## 2021-06-30 RX ORDER — HYDROXYZINE HYDROCHLORIDE 50 MG/1
50 TABLET, FILM COATED ORAL NIGHTLY PRN
Qty: 90 TABLET | Refills: 4 | Status: SHIPPED | OUTPATIENT
Start: 2021-06-30 | End: 2021-11-19 | Stop reason: SDUPTHER

## 2021-10-07 ENCOUNTER — TELEPHONE (OUTPATIENT)
Dept: ADMINISTRATIVE | Facility: HOSPITAL | Age: 61
End: 2021-10-07

## 2021-10-27 ENCOUNTER — TELEPHONE (OUTPATIENT)
Dept: INTERNAL MEDICINE | Facility: CLINIC | Age: 61
End: 2021-10-27
Payer: COMMERCIAL

## 2021-10-27 DIAGNOSIS — Z12.31 ENCOUNTER FOR SCREENING MAMMOGRAM FOR MALIGNANT NEOPLASM OF BREAST: Primary | ICD-10-CM

## 2021-10-27 RX ORDER — SIMVASTATIN 40 MG/1
40 TABLET, FILM COATED ORAL DAILY
Qty: 90 TABLET | Refills: 0 | Status: SHIPPED | OUTPATIENT
Start: 2021-10-27 | End: 2021-11-19 | Stop reason: SDUPTHER

## 2021-11-19 ENCOUNTER — OFFICE VISIT (OUTPATIENT)
Dept: INTERNAL MEDICINE | Facility: CLINIC | Age: 61
End: 2021-11-19
Payer: COMMERCIAL

## 2021-11-19 VITALS
OXYGEN SATURATION: 98 % | HEIGHT: 64 IN | HEART RATE: 51 BPM | DIASTOLIC BLOOD PRESSURE: 84 MMHG | SYSTOLIC BLOOD PRESSURE: 136 MMHG | BODY MASS INDEX: 29.17 KG/M2 | WEIGHT: 170.88 LBS

## 2021-11-19 DIAGNOSIS — Z12.11 COLON CANCER SCREENING: ICD-10-CM

## 2021-11-19 DIAGNOSIS — I10 PRIMARY HYPERTENSION: ICD-10-CM

## 2021-11-19 DIAGNOSIS — Z00.00 ROUTINE PHYSICAL EXAMINATION: Primary | ICD-10-CM

## 2021-11-19 DIAGNOSIS — E78.5 HYPERLIPIDEMIA, UNSPECIFIED HYPERLIPIDEMIA TYPE: ICD-10-CM

## 2021-11-19 DIAGNOSIS — F41.9 ANXIETY: ICD-10-CM

## 2021-11-19 DIAGNOSIS — S86.812S PATELLAR TENDON RUPTURE, LEFT, SEQUELA: ICD-10-CM

## 2021-11-19 DIAGNOSIS — G47.00 INSOMNIA, UNSPECIFIED TYPE: ICD-10-CM

## 2021-11-19 DIAGNOSIS — R73.09 ELEVATED GLUCOSE LEVEL: ICD-10-CM

## 2021-11-19 PROCEDURE — 1160F PR REVIEW ALL MEDS BY PRESCRIBER/CLIN PHARMACIST DOCUMENTED: ICD-10-PCS | Mod: CPTII,S$GLB,, | Performed by: INTERNAL MEDICINE

## 2021-11-19 PROCEDURE — 99999 PR PBB SHADOW E&M-EST. PATIENT-LVL IV: CPT | Mod: PBBFAC,,, | Performed by: INTERNAL MEDICINE

## 2021-11-19 PROCEDURE — 3079F PR MOST RECENT DIASTOLIC BLOOD PRESSURE 80-89 MM HG: ICD-10-PCS | Mod: CPTII,S$GLB,, | Performed by: INTERNAL MEDICINE

## 2021-11-19 PROCEDURE — 99999 PR PBB SHADOW E&M-EST. PATIENT-LVL IV: ICD-10-PCS | Mod: PBBFAC,,, | Performed by: INTERNAL MEDICINE

## 2021-11-19 PROCEDURE — 1159F PR MEDICATION LIST DOCUMENTED IN MEDICAL RECORD: ICD-10-PCS | Mod: CPTII,S$GLB,, | Performed by: INTERNAL MEDICINE

## 2021-11-19 PROCEDURE — 3075F SYST BP GE 130 - 139MM HG: CPT | Mod: CPTII,S$GLB,, | Performed by: INTERNAL MEDICINE

## 2021-11-19 PROCEDURE — 1160F RVW MEDS BY RX/DR IN RCRD: CPT | Mod: CPTII,S$GLB,, | Performed by: INTERNAL MEDICINE

## 2021-11-19 PROCEDURE — 99214 PR OFFICE/OUTPT VISIT, EST, LEVL IV, 30-39 MIN: ICD-10-PCS | Mod: S$GLB,,, | Performed by: INTERNAL MEDICINE

## 2021-11-19 PROCEDURE — 3008F PR BODY MASS INDEX (BMI) DOCUMENTED: ICD-10-PCS | Mod: CPTII,S$GLB,, | Performed by: INTERNAL MEDICINE

## 2021-11-19 PROCEDURE — 1159F MED LIST DOCD IN RCRD: CPT | Mod: CPTII,S$GLB,, | Performed by: INTERNAL MEDICINE

## 2021-11-19 PROCEDURE — 3008F BODY MASS INDEX DOCD: CPT | Mod: CPTII,S$GLB,, | Performed by: INTERNAL MEDICINE

## 2021-11-19 PROCEDURE — 3075F PR MOST RECENT SYSTOLIC BLOOD PRESS GE 130-139MM HG: ICD-10-PCS | Mod: CPTII,S$GLB,, | Performed by: INTERNAL MEDICINE

## 2021-11-19 PROCEDURE — 99214 OFFICE O/P EST MOD 30 MIN: CPT | Mod: S$GLB,,, | Performed by: INTERNAL MEDICINE

## 2021-11-19 PROCEDURE — 3079F DIAST BP 80-89 MM HG: CPT | Mod: CPTII,S$GLB,, | Performed by: INTERNAL MEDICINE

## 2021-11-19 RX ORDER — CITALOPRAM 40 MG/1
TABLET, FILM COATED ORAL
Qty: 90 TABLET | Refills: 12 | Status: SHIPPED | OUTPATIENT
Start: 2021-11-19 | End: 2023-01-17 | Stop reason: SDUPTHER

## 2021-11-19 RX ORDER — HYDROXYZINE HYDROCHLORIDE 50 MG/1
50 TABLET, FILM COATED ORAL NIGHTLY PRN
Qty: 90 TABLET | Refills: 4 | Status: SHIPPED | OUTPATIENT
Start: 2021-11-19 | End: 2023-01-17 | Stop reason: SDUPTHER

## 2021-11-19 RX ORDER — LORAZEPAM 1 MG/1
TABLET ORAL
Qty: 30 TABLET | Refills: 5 | Status: SHIPPED | OUTPATIENT
Start: 2021-11-19 | End: 2022-01-21 | Stop reason: SDUPTHER

## 2021-11-19 RX ORDER — SIMVASTATIN 40 MG/1
40 TABLET, FILM COATED ORAL DAILY
Qty: 90 TABLET | Refills: 4 | Status: SHIPPED | OUTPATIENT
Start: 2021-11-19 | End: 2023-01-17 | Stop reason: SDUPTHER

## 2021-11-22 ENCOUNTER — LAB VISIT (OUTPATIENT)
Dept: LAB | Facility: HOSPITAL | Age: 61
End: 2021-11-22
Attending: INTERNAL MEDICINE
Payer: COMMERCIAL

## 2021-11-22 DIAGNOSIS — E78.5 HYPERLIPIDEMIA, UNSPECIFIED HYPERLIPIDEMIA TYPE: ICD-10-CM

## 2021-11-22 DIAGNOSIS — G47.00 INSOMNIA, UNSPECIFIED TYPE: ICD-10-CM

## 2021-11-22 DIAGNOSIS — F32.A ANXIETY AND DEPRESSION: ICD-10-CM

## 2021-11-22 DIAGNOSIS — Z00.00 ROUTINE PHYSICAL EXAMINATION: ICD-10-CM

## 2021-11-22 DIAGNOSIS — I10 ESSENTIAL HYPERTENSION: ICD-10-CM

## 2021-11-22 DIAGNOSIS — F41.9 ANXIETY AND DEPRESSION: ICD-10-CM

## 2021-11-22 LAB
ALBUMIN SERPL BCP-MCNC: 4 G/DL (ref 3.5–5.2)
ALP SERPL-CCNC: 73 U/L (ref 55–135)
ALT SERPL W/O P-5'-P-CCNC: 14 U/L (ref 10–44)
ANION GAP SERPL CALC-SCNC: 10 MMOL/L (ref 8–16)
AST SERPL-CCNC: 18 U/L (ref 10–40)
BASOPHILS # BLD AUTO: 0.02 K/UL (ref 0–0.2)
BASOPHILS NFR BLD: 0.3 % (ref 0–1.9)
BILIRUB SERPL-MCNC: 0.8 MG/DL (ref 0.1–1)
BUN SERPL-MCNC: 16 MG/DL (ref 8–23)
CALCIUM SERPL-MCNC: 9.5 MG/DL (ref 8.7–10.5)
CHLORIDE SERPL-SCNC: 107 MMOL/L (ref 95–110)
CHOLEST SERPL-MCNC: 147 MG/DL (ref 120–199)
CHOLEST/HDLC SERPL: 2.7 {RATIO} (ref 2–5)
CO2 SERPL-SCNC: 24 MMOL/L (ref 23–29)
CREAT SERPL-MCNC: 0.8 MG/DL (ref 0.5–1.4)
DIFFERENTIAL METHOD: NORMAL
EOSINOPHIL # BLD AUTO: 0.3 K/UL (ref 0–0.5)
EOSINOPHIL NFR BLD: 4.3 % (ref 0–8)
ERYTHROCYTE [DISTWIDTH] IN BLOOD BY AUTOMATED COUNT: 12.4 % (ref 11.5–14.5)
EST. GFR  (AFRICAN AMERICAN): >60 ML/MIN/1.73 M^2
EST. GFR  (NON AFRICAN AMERICAN): >60 ML/MIN/1.73 M^2
GLUCOSE SERPL-MCNC: 101 MG/DL (ref 70–110)
HCT VFR BLD AUTO: 44 % (ref 37–48.5)
HDLC SERPL-MCNC: 55 MG/DL (ref 40–75)
HDLC SERPL: 37.4 % (ref 20–50)
HGB BLD-MCNC: 14.3 G/DL (ref 12–16)
IMM GRANULOCYTES # BLD AUTO: 0.01 K/UL (ref 0–0.04)
IMM GRANULOCYTES NFR BLD AUTO: 0.2 % (ref 0–0.5)
LDLC SERPL CALC-MCNC: 71.6 MG/DL (ref 63–159)
LYMPHOCYTES # BLD AUTO: 1.9 K/UL (ref 1–4.8)
LYMPHOCYTES NFR BLD: 30.3 % (ref 18–48)
MCH RBC QN AUTO: 29.7 PG (ref 27–31)
MCHC RBC AUTO-ENTMCNC: 32.5 G/DL (ref 32–36)
MCV RBC AUTO: 91 FL (ref 82–98)
MONOCYTES # BLD AUTO: 0.5 K/UL (ref 0.3–1)
MONOCYTES NFR BLD: 7.5 % (ref 4–15)
NEUTROPHILS # BLD AUTO: 3.6 K/UL (ref 1.8–7.7)
NEUTROPHILS NFR BLD: 57.4 % (ref 38–73)
NONHDLC SERPL-MCNC: 92 MG/DL
NRBC BLD-RTO: 0 /100 WBC
PLATELET # BLD AUTO: 236 K/UL (ref 150–450)
PMV BLD AUTO: 11 FL (ref 9.2–12.9)
POTASSIUM SERPL-SCNC: 4.8 MMOL/L (ref 3.5–5.1)
PROT SERPL-MCNC: 6.4 G/DL (ref 6–8.4)
RBC # BLD AUTO: 4.82 M/UL (ref 4–5.4)
SODIUM SERPL-SCNC: 141 MMOL/L (ref 136–145)
TRIGL SERPL-MCNC: 102 MG/DL (ref 30–150)
TSH SERPL DL<=0.005 MIU/L-ACNC: 1.1 UIU/ML (ref 0.4–4)
WBC # BLD AUTO: 6.27 K/UL (ref 3.9–12.7)

## 2021-11-22 PROCEDURE — 80053 COMPREHEN METABOLIC PANEL: CPT | Performed by: INTERNAL MEDICINE

## 2021-11-22 PROCEDURE — 80061 LIPID PANEL: CPT | Performed by: INTERNAL MEDICINE

## 2021-11-22 PROCEDURE — 84443 ASSAY THYROID STIM HORMONE: CPT | Performed by: INTERNAL MEDICINE

## 2021-11-22 PROCEDURE — 85025 COMPLETE CBC W/AUTO DIFF WBC: CPT | Performed by: INTERNAL MEDICINE

## 2021-11-22 PROCEDURE — 36415 COLL VENOUS BLD VENIPUNCTURE: CPT | Performed by: INTERNAL MEDICINE

## 2021-11-24 ENCOUNTER — HOSPITAL ENCOUNTER (OUTPATIENT)
Dept: RADIOLOGY | Facility: HOSPITAL | Age: 61
Discharge: HOME | End: 2021-11-24
Attending: INTERNAL MEDICINE
Payer: COMMERCIAL

## 2021-11-24 VITALS — BODY MASS INDEX: 29.02 KG/M2 | WEIGHT: 170 LBS | HEIGHT: 64 IN

## 2021-11-24 DIAGNOSIS — Z12.31 ENCOUNTER FOR SCREENING MAMMOGRAM FOR MALIGNANT NEOPLASM OF BREAST: ICD-10-CM

## 2021-11-24 PROCEDURE — 77067 SCR MAMMO BI INCL CAD: CPT | Mod: 26,,, | Performed by: RADIOLOGY

## 2021-11-24 PROCEDURE — 77067 SCR MAMMO BI INCL CAD: CPT | Mod: TC

## 2021-11-24 PROCEDURE — 77063 BREAST TOMOSYNTHESIS BI: CPT | Mod: 26,,, | Performed by: RADIOLOGY

## 2021-11-24 PROCEDURE — 77067 MAMMO DIGITAL SCREENING BILAT WITH TOMO: ICD-10-PCS | Mod: 26,,, | Performed by: RADIOLOGY

## 2021-11-24 PROCEDURE — 77063 MAMMO DIGITAL SCREENING BILAT WITH TOMO: ICD-10-PCS | Mod: 26,,, | Performed by: RADIOLOGY

## 2022-01-21 ENCOUNTER — PATIENT MESSAGE (OUTPATIENT)
Dept: INTERNAL MEDICINE | Facility: CLINIC | Age: 62
End: 2022-01-21
Payer: COMMERCIAL

## 2022-01-21 DIAGNOSIS — G47.00 INSOMNIA, UNSPECIFIED TYPE: ICD-10-CM

## 2022-01-21 DIAGNOSIS — F41.9 ANXIETY: ICD-10-CM

## 2022-01-21 NOTE — TELEPHONE ENCOUNTER
No new care gaps identified.  Powered by [employer]. Reference number: [id number].   1/21/2022 3:45:23 PM CST

## 2022-01-23 RX ORDER — LORAZEPAM 1 MG/1
TABLET ORAL
Qty: 30 TABLET | Refills: 5 | Status: SHIPPED | OUTPATIENT
Start: 2022-01-23 | End: 2022-06-29 | Stop reason: SDUPTHER

## 2022-01-26 ENCOUNTER — PATIENT MESSAGE (OUTPATIENT)
Dept: ADMINISTRATIVE | Facility: HOSPITAL | Age: 62
End: 2022-01-26
Payer: COMMERCIAL

## 2022-02-01 ENCOUNTER — OFFICE VISIT (OUTPATIENT)
Dept: INTERNAL MEDICINE | Facility: CLINIC | Age: 62
End: 2022-02-01
Payer: COMMERCIAL

## 2022-02-01 VITALS
OXYGEN SATURATION: 99 % | BODY MASS INDEX: 28.49 KG/M2 | HEIGHT: 64 IN | DIASTOLIC BLOOD PRESSURE: 80 MMHG | WEIGHT: 166.88 LBS | SYSTOLIC BLOOD PRESSURE: 144 MMHG | HEART RATE: 71 BPM

## 2022-02-01 DIAGNOSIS — Z86.69 HISTORY OF EAR INFECTION: ICD-10-CM

## 2022-02-01 DIAGNOSIS — R09.82 POST-NASAL DRIP: Primary | ICD-10-CM

## 2022-02-01 DIAGNOSIS — H91.93 HEARING DECREASED, BILATERAL: ICD-10-CM

## 2022-02-01 PROCEDURE — 99213 PR OFFICE/OUTPT VISIT, EST, LEVL III, 20-29 MIN: ICD-10-PCS | Mod: S$GLB,,, | Performed by: PHYSICIAN ASSISTANT

## 2022-02-01 PROCEDURE — 99999 PR PBB SHADOW E&M-EST. PATIENT-LVL V: ICD-10-PCS | Mod: PBBFAC,,, | Performed by: PHYSICIAN ASSISTANT

## 2022-02-01 PROCEDURE — 3077F PR MOST RECENT SYSTOLIC BLOOD PRESSURE >= 140 MM HG: ICD-10-PCS | Mod: CPTII,S$GLB,, | Performed by: PHYSICIAN ASSISTANT

## 2022-02-01 PROCEDURE — 1159F PR MEDICATION LIST DOCUMENTED IN MEDICAL RECORD: ICD-10-PCS | Mod: CPTII,S$GLB,, | Performed by: PHYSICIAN ASSISTANT

## 2022-02-01 PROCEDURE — 99213 OFFICE O/P EST LOW 20 MIN: CPT | Mod: S$GLB,,, | Performed by: PHYSICIAN ASSISTANT

## 2022-02-01 PROCEDURE — 1160F PR REVIEW ALL MEDS BY PRESCRIBER/CLIN PHARMACIST DOCUMENTED: ICD-10-PCS | Mod: CPTII,S$GLB,, | Performed by: PHYSICIAN ASSISTANT

## 2022-02-01 PROCEDURE — 3079F PR MOST RECENT DIASTOLIC BLOOD PRESSURE 80-89 MM HG: ICD-10-PCS | Mod: CPTII,S$GLB,, | Performed by: PHYSICIAN ASSISTANT

## 2022-02-01 PROCEDURE — 1159F MED LIST DOCD IN RCRD: CPT | Mod: CPTII,S$GLB,, | Performed by: PHYSICIAN ASSISTANT

## 2022-02-01 PROCEDURE — 3079F DIAST BP 80-89 MM HG: CPT | Mod: CPTII,S$GLB,, | Performed by: PHYSICIAN ASSISTANT

## 2022-02-01 PROCEDURE — 3008F PR BODY MASS INDEX (BMI) DOCUMENTED: ICD-10-PCS | Mod: CPTII,S$GLB,, | Performed by: PHYSICIAN ASSISTANT

## 2022-02-01 PROCEDURE — 3008F BODY MASS INDEX DOCD: CPT | Mod: CPTII,S$GLB,, | Performed by: PHYSICIAN ASSISTANT

## 2022-02-01 PROCEDURE — 1160F RVW MEDS BY RX/DR IN RCRD: CPT | Mod: CPTII,S$GLB,, | Performed by: PHYSICIAN ASSISTANT

## 2022-02-01 PROCEDURE — 3077F SYST BP >= 140 MM HG: CPT | Mod: CPTII,S$GLB,, | Performed by: PHYSICIAN ASSISTANT

## 2022-02-01 PROCEDURE — 99999 PR PBB SHADOW E&M-EST. PATIENT-LVL V: CPT | Mod: PBBFAC,,, | Performed by: PHYSICIAN ASSISTANT

## 2022-02-01 RX ORDER — METHYLPREDNISOLONE 4 MG/1
TABLET ORAL
Qty: 1 EACH | Refills: 0 | Status: SHIPPED | OUTPATIENT
Start: 2022-02-01 | End: 2022-05-02

## 2022-02-01 NOTE — PROGRESS NOTES
"Subjective:       Patient ID: Marielena Tracy is a 61 y.o. female.    Chief Complaint: Otitis Media    HPI     Here for follow up of ear infection.     She notes about 3 weeks ago she staring having bilateral ear pain, ear pressure/fluid, muffled hearing. She was out of town for her mother's , Seen at outside urgent care. diagnosed with bilateral ear infections. Prescribed Augmentin for 7 days, and states she rcvd 2 shots(?abx and steroid) in the clinic as well.     Today she states the pain has resolved but still with muffled hearing. Feels like fluid in her ears. She has tried OTC swimmer ear and flonase without much improvement.   She also notes nasal drip/congestion.     Past Medical History:   Diagnosis Date    Anxiety and depression 2014    Colon polyps     Glaucoma     Hyperlipidemia     Hypertension     pt states " i don't have high blood pressure"    Keloid cicatrix     Obesity (BMI 30.0-34.9)      Social History     Tobacco Use    Smoking status: Former Smoker    Smokeless tobacco: Never Used   Substance Use Topics    Alcohol use: Yes     Comment: few times a week     Drug use: No     Review of patient's allergies indicates:   Allergen Reactions    Acitretin     Sulfa (sulfonamide antibiotics)        Review of Systems   Constitutional: Negative for activity change and unexpected weight change.   HENT: Positive for ear pain (now resolved), hearing loss (decreased, muffled) and postnasal drip. Negative for rhinorrhea and trouble swallowing.    Eyes: Negative for discharge and visual disturbance.   Respiratory: Negative for chest tightness and wheezing.    Cardiovascular: Negative for chest pain and palpitations.   Gastrointestinal: Negative for blood in stool, constipation, diarrhea and vomiting.   Endocrine: Negative for polydipsia and polyuria.   Genitourinary: Negative for difficulty urinating, dysuria, hematuria and menstrual problem.   Musculoskeletal: Negative for " "arthralgias, joint swelling and neck pain.   Neurological: Negative for weakness and headaches.   Psychiatric/Behavioral: Negative for confusion and dysphoric mood.         Objective: BP (!) 144/80 (BP Location: Left arm, Patient Position: Sitting, BP Method: Medium (Manual))   Pulse 71   Ht 5' 4" (1.626 m)   Wt 75.7 kg (166 lb 14.2 oz)   SpO2 99%   BMI 28.65 kg/m²         Physical Exam  Vitals reviewed.   Constitutional:       General: She is not in acute distress.     Appearance: She is well-developed.   HENT:      Head: Normocephalic and atraumatic.      Right Ear: Ear canal and external ear normal. Decreased hearing noted. No drainage, swelling or tenderness. A middle ear effusion is present. Tympanic membrane is injected and scarred. Tympanic membrane is not perforated.      Left Ear: Ear canal and external ear normal. Decreased hearing noted. No drainage, swelling or tenderness. Tympanic membrane is not perforated.      Nose: Congestion present.      Right Sinus: No maxillary sinus tenderness or frontal sinus tenderness.      Left Sinus: No maxillary sinus tenderness or frontal sinus tenderness.   Eyes:      Pupils: Pupils are equal, round, and reactive to light.   Cardiovascular:      Rate and Rhythm: Normal rate and regular rhythm.      Heart sounds: No murmur heard.  No friction rub.   Pulmonary:      Effort: Pulmonary effort is normal.      Breath sounds: Normal breath sounds. No wheezing or rales.   Abdominal:      General: Bowel sounds are normal.      Palpations: Abdomen is soft.      Tenderness: There is no abdominal tenderness.   Skin:     General: Skin is warm and dry.      Findings: No rash.   Neurological:      Mental Status: She is alert and oriented to person, place, and time.         Assessment:       Problem List Items Addressed This Visit    None     Visit Diagnoses     Post-nasal drip    -  Primary    Relevant Medications    methylPREDNISolone (MEDROL, LUKAS,) 4 mg tablet    History of ear " infection        Relevant Orders    Ambulatory referral/consult to ENT    Hearing decreased, bilateral        Relevant Orders    Ambulatory referral/consult to ENT          Plan:         Marielena was seen today for otitis media.    Diagnoses and all orders for this visit:    Post-nasal drip  -     methylPREDNISolone (MEDROL, LUKAS,) 4 mg tablet; use as directed  -     Continue Flonase    History of ear infection  Hearing decreased, bilateral  Recommend ENT f/u  -     Ambulatory referral/consult to ENT; Future    Ana María Ronquillo PA-C

## 2022-02-17 ENCOUNTER — PATIENT OUTREACH (OUTPATIENT)
Dept: ADMINISTRATIVE | Facility: OTHER | Age: 62
End: 2022-02-17
Payer: COMMERCIAL

## 2022-02-17 NOTE — PROGRESS NOTES
Health Maintenance Due   Topic Date Due    HIV Screening  Never done    TETANUS VACCINE  Never done    DEXA SCAN  05/18/2020    Cervical Cancer Screening  08/14/2021    Colorectal Cancer Screening  08/27/2021    Influenza Vaccine (1) 09/01/2021    COVID-19 Vaccine (3 - Booster for Moderna series) 10/15/2021     Updates were requested from care everywhere.  Chart was reviewed for overdue Proactive [facility] Encounters ([facility]) topics (CRS, Breast Cancer Screening, Eye exam)  Health Maintenance has been updated.  [facility] immunization registry triggered.  Immunizations were reconciled.

## 2022-03-16 ENCOUNTER — PATIENT MESSAGE (OUTPATIENT)
Dept: ADMINISTRATIVE | Facility: HOSPITAL | Age: 62
End: 2022-03-16
Payer: COMMERCIAL

## 2022-05-02 ENCOUNTER — OFFICE VISIT (OUTPATIENT)
Dept: OBSTETRICS AND GYNECOLOGY | Facility: CLINIC | Age: 62
End: 2022-05-02
Payer: COMMERCIAL

## 2022-05-02 VITALS
HEIGHT: 64 IN | DIASTOLIC BLOOD PRESSURE: 78 MMHG | BODY MASS INDEX: 29.17 KG/M2 | WEIGHT: 170.88 LBS | SYSTOLIC BLOOD PRESSURE: 132 MMHG

## 2022-05-02 DIAGNOSIS — Z01.419 WELL WOMAN EXAM WITH ROUTINE GYNECOLOGICAL EXAM: Primary | ICD-10-CM

## 2022-05-02 DIAGNOSIS — N95.2 VAGINAL ATROPHY: ICD-10-CM

## 2022-05-02 PROCEDURE — 88175 CYTOPATH C/V AUTO FLUID REDO: CPT | Performed by: STUDENT IN AN ORGANIZED HEALTH CARE EDUCATION/TRAINING PROGRAM

## 2022-05-02 PROCEDURE — 3008F PR BODY MASS INDEX (BMI) DOCUMENTED: ICD-10-PCS | Mod: CPTII,S$GLB,, | Performed by: STUDENT IN AN ORGANIZED HEALTH CARE EDUCATION/TRAINING PROGRAM

## 2022-05-02 PROCEDURE — 3078F PR MOST RECENT DIASTOLIC BLOOD PRESSURE < 80 MM HG: ICD-10-PCS | Mod: CPTII,S$GLB,, | Performed by: STUDENT IN AN ORGANIZED HEALTH CARE EDUCATION/TRAINING PROGRAM

## 2022-05-02 PROCEDURE — 99386 PREV VISIT NEW AGE 40-64: CPT | Mod: S$GLB,,, | Performed by: STUDENT IN AN ORGANIZED HEALTH CARE EDUCATION/TRAINING PROGRAM

## 2022-05-02 PROCEDURE — 3008F BODY MASS INDEX DOCD: CPT | Mod: CPTII,S$GLB,, | Performed by: STUDENT IN AN ORGANIZED HEALTH CARE EDUCATION/TRAINING PROGRAM

## 2022-05-02 PROCEDURE — 99386 PR PREVENTIVE VISIT,NEW,40-64: ICD-10-PCS | Mod: S$GLB,,, | Performed by: STUDENT IN AN ORGANIZED HEALTH CARE EDUCATION/TRAINING PROGRAM

## 2022-05-02 PROCEDURE — 1159F PR MEDICATION LIST DOCUMENTED IN MEDICAL RECORD: ICD-10-PCS | Mod: CPTII,S$GLB,, | Performed by: STUDENT IN AN ORGANIZED HEALTH CARE EDUCATION/TRAINING PROGRAM

## 2022-05-02 PROCEDURE — 1159F MED LIST DOCD IN RCRD: CPT | Mod: CPTII,S$GLB,, | Performed by: STUDENT IN AN ORGANIZED HEALTH CARE EDUCATION/TRAINING PROGRAM

## 2022-05-02 PROCEDURE — 3075F PR MOST RECENT SYSTOLIC BLOOD PRESS GE 130-139MM HG: ICD-10-PCS | Mod: CPTII,S$GLB,, | Performed by: STUDENT IN AN ORGANIZED HEALTH CARE EDUCATION/TRAINING PROGRAM

## 2022-05-02 PROCEDURE — 3078F DIAST BP <80 MM HG: CPT | Mod: CPTII,S$GLB,, | Performed by: STUDENT IN AN ORGANIZED HEALTH CARE EDUCATION/TRAINING PROGRAM

## 2022-05-02 PROCEDURE — 3075F SYST BP GE 130 - 139MM HG: CPT | Mod: CPTII,S$GLB,, | Performed by: STUDENT IN AN ORGANIZED HEALTH CARE EDUCATION/TRAINING PROGRAM

## 2022-05-02 RX ORDER — ESTRADIOL 0.1 MG/G
1 CREAM VAGINAL
Qty: 42.5 G | Refills: 5 | Status: SHIPPED | OUTPATIENT
Start: 2022-05-02 | End: 2023-05-02

## 2022-05-02 NOTE — PROGRESS NOTES
"History & Physical  Gynecology      SUBJECTIVE:     Chief Complaint: Well Woman       History of Present Illness:    Menstrual History: menopausal since a few years ago. No PMB. No HRT. Has had issues with vaginal dryness, causing extreme discomfort with sex  Obstetric Hx: CSx2  Sexually Active: one male partner, too painful. Bleeding sometimes.  Family history: below  Social: Wears seatbelts. Exercises. Feels safe at home.   Last pap:  normal  Last mammo: 2021  Colonoscopy: pending scheduled  DEXA: n/a  Covid vaccine yes  Labs per PCP  Vitamins: yes      Review of patient's allergies indicates:   Allergen Reactions    Acitretin     Sulfa (sulfonamide antibiotics)        Past Medical History:   Diagnosis Date    Anxiety and depression 2014    Colon polyps     Glaucoma     Hyperlipidemia     Hypertension     pt states " i don't have high blood pressure"    Keloid cicatrix     Obesity (BMI 30.0-34.9)      Past Surgical History:   Procedure Laterality Date     SECTION      x2    COLONOSCOPY N/A 2016    Procedure: COLONOSCOPY;  Surgeon: SHANNON Nj MD;  Location: [address]);  Service: Endoscopy;  Laterality: N/A;    KNEE SURGERY Left 2019    PATELLAR TENDON REPAIR Left 2019    Procedure: REPAIR/RECONSTRUCTION TENDON, PATELLAR;  Surgeon: Danielle Cloud MD;  Location: [facility];  Service: Orthopedics;  Laterality: Left;     OB History        2    Para   2    Term   2            AB        Living           SAB        IAB        Ectopic        Multiple        Live Births                   Family History   Problem Relation Age of Onset    Dementia Father     Breast cancer Sister     Cancer Paternal Grandmother     Cancer Brother     Glaucoma Maternal Uncle     Colon cancer Neg Hx     Ovarian cancer Neg Hx     Blindness Neg Hx     Macular degeneration Neg Hx     Retinal detachment Neg Hx      Social History     Tobacco Use    Smoking status: " Former Smoker    Smokeless tobacco: Never Used   Substance Use Topics    Alcohol use: Yes     Comment: few times a week     Drug use: No       Current Outpatient Medications   Medication Sig    ascorbic acid, vitamin C, (VITAMIN C) 100 MG tablet Take 1,000 mg by mouth 2 (two) times daily.    aspirin (ECOTRIN) 81 MG EC tablet Take 81 mg by mouth once daily.    CALCIUM CARBONATE/VITAMIN D3 (VITAMIN D-3 ORAL) Take 1,000 mg by mouth once daily.    hydrOXYzine (ATARAX) 50 MG tablet Take 1 tablet (50 mg total) by mouth nightly as needed (insomnia).    LORazepam (ATIVAN) 1 MG tablet 1 tablet nightly as needed    simvastatin (ZOCOR) 40 MG tablet Take 1 tablet (40 mg total) by mouth once daily.    urea 50 % Crea Apply 1 application topically once daily. Apply to affected area    citalopram (CELEXA) 40 MG tablet TAKE 1 TABLET BY MOUTH EVERY DAY    estradioL (ESTRACE) 0.01 % (0.1 mg/gram) vaginal cream Place 1 g vaginally twice a week.     No current facility-administered medications for this visit.         Review of Systems:  Review of Systems   Constitutional: Negative for activity change, appetite change, fever and unexpected weight change.   Respiratory: Negative for cough and shortness of breath.    Cardiovascular: Negative for chest pain.   Gastrointestinal: Negative for abdominal pain, blood in stool, constipation, diarrhea, nausea and vomiting.   Endocrine: Negative for hot flashes.   Genitourinary: Positive for dyspareunia, postcoital bleeding and vaginal dryness. Negative for dysuria, frequency, hematuria, hot flashes, pelvic pain, urgency, vaginal bleeding, vaginal discharge, vaginal pain, urinary incontinence and postmenopausal bleeding.   Integumentary:  Negative for breast mass.   Psychiatric/Behavioral: Negative for sleep disturbance.   Breast: Negative for lump and mass       OBJECTIVE:     Physical Exam:  Physical Exam  Vitals reviewed.   Constitutional:       General: She is not in acute  distress.     Appearance: She is well-developed. She is not diaphoretic.   HENT:      Head: Normocephalic and atraumatic.   Eyes:      General: No scleral icterus.        Right eye: No discharge.         Left eye: No discharge.      Conjunctiva/sclera: Conjunctivae normal.   Neck:      Thyroid: No thyromegaly.   Cardiovascular:      Rate and Rhythm: Normal rate.   Pulmonary:      Effort: Pulmonary effort is normal.   Chest:   Breasts: Breasts are symmetrical.      Right: No inverted nipple, mass, nipple discharge, skin change or tenderness.      Left: No inverted nipple, mass, nipple discharge, skin change or tenderness.       Abdominal:      General: There is no distension.      Palpations: Abdomen is soft.      Tenderness: There is no abdominal tenderness.   Genitourinary:     Labia:         Right: No rash, tenderness, lesion or injury.         Left: No rash, tenderness, lesion or injury.       Vagina: Normal. No signs of injury and foreign body. No vaginal discharge, erythema, tenderness or bleeding.      Cervix: No cervical motion tenderness, discharge or friability.      Uterus: Not deviated, not enlarged, not fixed and not tender.       Adnexa:         Right: No mass, tenderness or fullness.          Left: No mass, tenderness or fullness.        Comments: atrophic  Musculoskeletal:         General: Normal range of motion.      Cervical back: Normal range of motion and neck supple.   Lymphadenopathy:      Cervical: No cervical adenopathy.   Skin:     General: Skin is warm and dry.      Findings: No erythema or rash.   Neurological:      Mental Status: She is alert and oriented to person, place, and time.           ASSESSMENT:       ICD-10-CM ICD-9-CM    1. Well woman exam with routine gynecological exam  Z01.419 V72.31 Liquid-Based Pap Smear, Screening   2. Vaginal atrophy  N95.2 627.3           Plan:      WWE  - Postmenopausal. No issues.  - Vaccines utd  - Labs utd  - DEXA age 64  - Mammogram, Colonoscopy  utd  - Pap collected  - Vitamin D and Calcium recs given  - CBE normal. Physical exam normal. VSS    Vaginal atrophy  - Premarin cream rx  - Consider dilators, PFPT    Counseling time: 30 min    Elizabeth Berry

## 2022-06-29 ENCOUNTER — OFFICE VISIT (OUTPATIENT)
Dept: INTERNAL MEDICINE | Facility: CLINIC | Age: 62
End: 2022-06-29
Payer: COMMERCIAL

## 2022-06-29 VITALS
OXYGEN SATURATION: 98 % | SYSTOLIC BLOOD PRESSURE: 138 MMHG | BODY MASS INDEX: 28.15 KG/M2 | HEIGHT: 64 IN | DIASTOLIC BLOOD PRESSURE: 86 MMHG | WEIGHT: 164.88 LBS | HEART RATE: 97 BPM

## 2022-06-29 DIAGNOSIS — J06.9 UPPER RESPIRATORY TRACT INFECTION, UNSPECIFIED TYPE: ICD-10-CM

## 2022-06-29 DIAGNOSIS — I10 PRIMARY HYPERTENSION: Primary | ICD-10-CM

## 2022-06-29 DIAGNOSIS — G47.00 INSOMNIA, UNSPECIFIED TYPE: ICD-10-CM

## 2022-06-29 DIAGNOSIS — E78.5 HYPERLIPIDEMIA, UNSPECIFIED HYPERLIPIDEMIA TYPE: ICD-10-CM

## 2022-06-29 DIAGNOSIS — M25.551 RIGHT HIP PAIN: ICD-10-CM

## 2022-06-29 DIAGNOSIS — F41.9 ANXIETY: ICD-10-CM

## 2022-06-29 DIAGNOSIS — F41.9 ANXIETY AND DEPRESSION: ICD-10-CM

## 2022-06-29 DIAGNOSIS — F32.A ANXIETY AND DEPRESSION: ICD-10-CM

## 2022-06-29 PROCEDURE — 1159F PR MEDICATION LIST DOCUMENTED IN MEDICAL RECORD: ICD-10-PCS | Mod: CPTII,S$GLB,, | Performed by: INTERNAL MEDICINE

## 2022-06-29 PROCEDURE — 3075F PR MOST RECENT SYSTOLIC BLOOD PRESS GE 130-139MM HG: ICD-10-PCS | Mod: CPTII,S$GLB,, | Performed by: INTERNAL MEDICINE

## 2022-06-29 PROCEDURE — 3079F DIAST BP 80-89 MM HG: CPT | Mod: CPTII,S$GLB,, | Performed by: INTERNAL MEDICINE

## 2022-06-29 PROCEDURE — 99214 PR OFFICE/OUTPT VISIT, EST, LEVL IV, 30-39 MIN: ICD-10-PCS | Mod: S$GLB,,, | Performed by: INTERNAL MEDICINE

## 2022-06-29 PROCEDURE — 3075F SYST BP GE 130 - 139MM HG: CPT | Mod: CPTII,S$GLB,, | Performed by: INTERNAL MEDICINE

## 2022-06-29 PROCEDURE — 3008F BODY MASS INDEX DOCD: CPT | Mod: CPTII,S$GLB,, | Performed by: INTERNAL MEDICINE

## 2022-06-29 PROCEDURE — 99999 PR PBB SHADOW E&M-EST. PATIENT-LVL IV: ICD-10-PCS | Mod: PBBFAC,,, | Performed by: INTERNAL MEDICINE

## 2022-06-29 PROCEDURE — 1160F PR REVIEW ALL MEDS BY PRESCRIBER/CLIN PHARMACIST DOCUMENTED: ICD-10-PCS | Mod: CPTII,S$GLB,, | Performed by: INTERNAL MEDICINE

## 2022-06-29 PROCEDURE — 99999 PR PBB SHADOW E&M-EST. PATIENT-LVL IV: CPT | Mod: PBBFAC,,, | Performed by: INTERNAL MEDICINE

## 2022-06-29 PROCEDURE — 3079F PR MOST RECENT DIASTOLIC BLOOD PRESSURE 80-89 MM HG: ICD-10-PCS | Mod: CPTII,S$GLB,, | Performed by: INTERNAL MEDICINE

## 2022-06-29 PROCEDURE — 99214 OFFICE O/P EST MOD 30 MIN: CPT | Mod: S$GLB,,, | Performed by: INTERNAL MEDICINE

## 2022-06-29 PROCEDURE — 1159F MED LIST DOCD IN RCRD: CPT | Mod: CPTII,S$GLB,, | Performed by: INTERNAL MEDICINE

## 2022-06-29 PROCEDURE — 3008F PR BODY MASS INDEX (BMI) DOCUMENTED: ICD-10-PCS | Mod: CPTII,S$GLB,, | Performed by: INTERNAL MEDICINE

## 2022-06-29 PROCEDURE — 1160F RVW MEDS BY RX/DR IN RCRD: CPT | Mod: CPTII,S$GLB,, | Performed by: INTERNAL MEDICINE

## 2022-06-29 RX ORDER — LORAZEPAM 1 MG/1
TABLET ORAL
Qty: 30 TABLET | Refills: 5 | Status: SHIPPED | OUTPATIENT
Start: 2022-07-20 | End: 2023-01-17 | Stop reason: SDUPTHER

## 2022-06-29 NOTE — PROGRESS NOTES
Subjective:       Patient ID: Marielena Tracy is a 61 y.o. female.   Chief Complaint: Medication Refill    Comes in for follow-up hypertension and anxiety.  Her mom  since our last visit but she says the family got to spend wonderful celebration with her just before she passed a for a family wedding.   reviewed.  Prescription will be refilled.  No escalation.  Side effects discussed.  Driving and drowsy precautions discussed.  She has a mild head cold that she is getting over.  It is improving.  No fevers or purulent sputum.  Mild head congestion.  She is taking some cough and cold meds and that may have her blood pressure up slightly.  We will recheck it.  She is having some recurrent right upper leg pain near the hip.  She would like to try physical therapy.  Home exercises have not been helping    Review of Systems   Constitutional: Negative for appetite change, chills and fever.   HENT: Negative for nosebleeds and sore throat.    Eyes: Negative for pain and visual disturbance.   Respiratory: Negative for cough, shortness of breath and wheezing.    Cardiovascular: Negative for chest pain and leg swelling.   Gastrointestinal: Negative for abdominal pain, constipation and diarrhea.   Endocrine: Negative for polyuria.   Genitourinary: Negative for difficulty urinating, hematuria and vaginal bleeding.   Musculoskeletal: Positive for leg pain (Right upper leg). Negative for arthralgias, back pain, gait problem and neck pain.   Integumentary:  Negative for pallor and rash.   Neurological: Negative for tremors, seizures and headaches.   Hematological: Does not bruise/bleed easily.   Psychiatric/Behavioral: Negative for dysphoric mood. The patient is nervous/anxious.           Objective:      Physical Exam  Constitutional:       General: She is not in acute distress.     Appearance: She is well-developed.   HENT:      Head: Normocephalic and atraumatic.      Right Ear: Tympanic membrane, ear canal and  external ear normal.      Left Ear: Tympanic membrane, ear canal and external ear normal.      Mouth/Throat:      Pharynx: No oropharyngeal exudate or posterior oropharyngeal erythema.   Eyes:      General: No scleral icterus.     Conjunctiva/sclera: Conjunctivae normal.      Pupils: Pupils are equal, round, and reactive to light.   Neck:      Thyroid: No thyromegaly.   Cardiovascular:      Rate and Rhythm: Normal rate and regular rhythm.      Pulses: Normal pulses.      Heart sounds: No murmur heard.  Pulmonary:      Effort: Pulmonary effort is normal.      Breath sounds: Normal breath sounds. No wheezing.   Musculoskeletal:         General: Tenderness (Mild, right upper lateral thigh) present.      Cervical back: Normal range of motion and neck supple.      Right lower leg: No edema.      Left lower leg: No edema.   Lymphadenopathy:      Cervical: No cervical adenopathy.   Skin:     Coloration: Skin is not jaundiced.      Findings: No rash.   Neurological:      General: No focal deficit present.      Mental Status: She is alert and oriented to person, place, and time.   Psychiatric:         Mood and Affect: Mood normal.         Behavior: Behavior normal.         Assessment:       Problem List Items Addressed This Visit        Psychiatric    Anxiety and depression       Cardiac/Vascular    Hyperlipidemia    HTN (hypertension) - Primary      Other Visit Diagnoses     Anxiety        Relevant Medications    LORazepam (ATIVAN) 1 MG tablet (Start on 7/20/2022)    Insomnia, unspecified type        Relevant Medications    LORazepam (ATIVAN) 1 MG tablet (Start on 7/20/2022)    Right hip pain        Relevant Orders    Ambulatory referral/consult to Physical/Occupational Therapy    Upper respiratory tract infection, unspecified type              Plan:       Marielena was seen today for medication refill.    Diagnoses and all orders for this visit:    Primary hypertension    Anxiety  -     LORazepam (ATIVAN) 1 MG tablet; 1  "tablet nightly as needed    Insomnia, unspecified type  -     LORazepam (ATIVAN) 1 MG tablet; 1 tablet nightly as needed    Hyperlipidemia, unspecified hyperlipidemia type    Anxiety and depression    Right hip pain  -     Ambulatory referral/consult to Physical/Occupational Therapy; Future    Upper respiratory tract infection, unspecified type           Labs next visit.   reviewed, prescription printed a    Portions of this note may have been created with voice recognition software. Occasional "wrong-word" or "sound-a-like" substitutions may have occurred due to the inherent limitations of voice recognition software. Please, read the note carefully and recognize, using context, where substitutions have occurred.  "

## 2022-12-07 DIAGNOSIS — Z12.31 OTHER SCREENING MAMMOGRAM: ICD-10-CM

## 2022-12-13 ENCOUNTER — PATIENT MESSAGE (OUTPATIENT)
Dept: ADMINISTRATIVE | Facility: HOSPITAL | Age: 62
End: 2022-12-13
Payer: COMMERCIAL

## 2023-01-17 ENCOUNTER — OFFICE VISIT (OUTPATIENT)
Dept: INTERNAL MEDICINE | Facility: CLINIC | Age: 63
End: 2023-01-17
Payer: COMMERCIAL

## 2023-01-17 DIAGNOSIS — F41.9 ANXIETY AND DEPRESSION: Primary | ICD-10-CM

## 2023-01-17 DIAGNOSIS — F41.9 ANXIETY: ICD-10-CM

## 2023-01-17 DIAGNOSIS — F32.A ANXIETY AND DEPRESSION: Primary | ICD-10-CM

## 2023-01-17 DIAGNOSIS — M25.531 RIGHT WRIST PAIN: ICD-10-CM

## 2023-01-17 DIAGNOSIS — G47.00 INSOMNIA, UNSPECIFIED TYPE: ICD-10-CM

## 2023-01-17 DIAGNOSIS — I10 PRIMARY HYPERTENSION: ICD-10-CM

## 2023-01-17 PROCEDURE — 1160F PR REVIEW ALL MEDS BY PRESCRIBER/CLIN PHARMACIST DOCUMENTED: ICD-10-PCS | Mod: CPTII,95,, | Performed by: INTERNAL MEDICINE

## 2023-01-17 PROCEDURE — 99214 PR OFFICE/OUTPT VISIT, EST, LEVL IV, 30-39 MIN: ICD-10-PCS | Mod: 95,,, | Performed by: INTERNAL MEDICINE

## 2023-01-17 PROCEDURE — 1159F MED LIST DOCD IN RCRD: CPT | Mod: CPTII,95,, | Performed by: INTERNAL MEDICINE

## 2023-01-17 PROCEDURE — 99214 OFFICE O/P EST MOD 30 MIN: CPT | Mod: 95,,, | Performed by: INTERNAL MEDICINE

## 2023-01-17 PROCEDURE — 1160F RVW MEDS BY RX/DR IN RCRD: CPT | Mod: CPTII,95,, | Performed by: INTERNAL MEDICINE

## 2023-01-17 PROCEDURE — 1159F PR MEDICATION LIST DOCUMENTED IN MEDICAL RECORD: ICD-10-PCS | Mod: CPTII,95,, | Performed by: INTERNAL MEDICINE

## 2023-01-17 RX ORDER — LORAZEPAM 1 MG/1
TABLET ORAL
Qty: 30 TABLET | Refills: 5 | Status: SHIPPED | OUTPATIENT
Start: 2023-01-17 | End: 2023-07-11 | Stop reason: SDUPTHER

## 2023-01-17 RX ORDER — HYDROXYZINE HYDROCHLORIDE 50 MG/1
50 TABLET, FILM COATED ORAL NIGHTLY PRN
Qty: 90 TABLET | Refills: 4 | Status: SHIPPED | OUTPATIENT
Start: 2023-01-17 | End: 2024-04-02 | Stop reason: SDUPTHER

## 2023-01-17 RX ORDER — SIMVASTATIN 40 MG/1
40 TABLET, FILM COATED ORAL DAILY
Qty: 90 TABLET | Refills: 4 | Status: SHIPPED | OUTPATIENT
Start: 2023-01-17 | End: 2024-04-02 | Stop reason: SDUPTHER

## 2023-01-17 RX ORDER — CITALOPRAM 40 MG/1
TABLET, FILM COATED ORAL
Qty: 90 TABLET | Refills: 12 | Status: SHIPPED | OUTPATIENT
Start: 2023-01-17 | End: 2023-07-11 | Stop reason: SDUPTHER

## 2023-01-17 NOTE — PROGRESS NOTES
Subjective:       Patient ID: Marielena Tracy is a 62 y.o. female.    Chief Complaint: Follow-up      The patient location is: [place]  The chief complaint leading to consultation is: Med refill, Problem review    Visit type: audiovisual    Face to Face time with patient: 15 minutes  18 minutes of total time spent on the encounter, which includes face to face time and non-face to face time preparing to see the patient (eg, review of tests), Obtaining and/or reviewing separately obtained history, Documenting clinical information in the electronic or other health record, Independently interpreting results (not separately reported) and communicating results to the patient/family/caregiver, or Care coordination (not separately reported).         Each patient to whom he or she provides medical services by telemedicine is:  (1) informed of the relationship between the physician and patient and the respective role of any other health care provider with respect to management of the patient; and (2) notified that he or she may decline to receive medical services by telemedicine and may withdraw from such care at any time.    Notes:     Patient seen via virtual visit for follow-up anxiety, medication refill.   reviewed.  She still having some grief over her mother's death and this was the 1st [place] holiday season without either of her parents.    She denies any GI or  complaints.  No cough or wheeze.  No nausea vomiting or diarrhea.  Mild right wrist strain and trigger finger.  Both are responding to brace and conservative treatment.    She has not escalated her medication use.  She denies any problems or side effects.  She will be due for labs later in the year    Follow-up  Associated symptoms include arthralgias. Pertinent negatives include no fever.   Review of Systems   Constitutional:  Negative for fever.   Respiratory:  Negative for chest tightness and shortness of breath.    Musculoskeletal:  Positive for  arthralgias.   Psychiatric/Behavioral:  Positive for sleep disturbance. The patient is nervous/anxious.        Objective:      Physical Exam  Constitutional:       Appearance: Normal appearance.   Neurological:      General: No focal deficit present.      Mental Status: She is alert and oriented to person, place, and time.   Psychiatric:         Mood and Affect: Mood normal.         Behavior: Behavior normal.       Assessment:       Problem List Items Addressed This Visit          Psychiatric    Anxiety and depression - Primary    Relevant Orders    Comprehensive Metabolic Panel    Hemoglobin A1C    Lipid Panel    TSH       Cardiac/Vascular    HTN (hypertension)    Relevant Orders    Comprehensive Metabolic Panel    Hemoglobin A1C    Lipid Panel    TSH     Other Visit Diagnoses       Right wrist pain        Relevant Orders    Comprehensive Metabolic Panel    Hemoglobin A1C    Lipid Panel    TSH    Insomnia, unspecified type        Relevant Medications    hydrOXYzine (ATARAX) 50 MG tablet    LORazepam (ATIVAN) 1 MG tablet    Other Relevant Orders    Comprehensive Metabolic Panel    Hemoglobin A1C    Lipid Panel    TSH    Anxiety        Relevant Medications    LORazepam (ATIVAN) 1 MG tablet    Other Relevant Orders    Comprehensive Metabolic Panel    Hemoglobin A1C    Lipid Panel    TSH            Plan:       Marielena was seen today for follow-up.    Diagnoses and all orders for this visit:    Anxiety and depression  -     Comprehensive Metabolic Panel; Future  -     Hemoglobin A1C; Future  -     Lipid Panel; Future  -     TSH; Future    Primary hypertension  -     Comprehensive Metabolic Panel; Future  -     Hemoglobin A1C; Future  -     Lipid Panel; Future  -     TSH; Future    Right wrist pain  -     Comprehensive Metabolic Panel; Future  -     Hemoglobin A1C; Future  -     Lipid Panel; Future  -     TSH; Future    Insomnia, unspecified type  -     hydrOXYzine (ATARAX) 50 MG tablet; Take 1 tablet (50 mg total) by  "mouth nightly as needed (insomnia).  -     LORazepam (ATIVAN) 1 MG tablet; 1 tablet nightly as needed  -     Comprehensive Metabolic Panel; Future  -     Hemoglobin A1C; Future  -     Lipid Panel; Future  -     TSH; Future    Anxiety  -     LORazepam (ATIVAN) 1 MG tablet; 1 tablet nightly as needed  -     Comprehensive Metabolic Panel; Future  -     Hemoglobin A1C; Future  -     Lipid Panel; Future  -     TSH; Future    Other orders  -     simvastatin (ZOCOR) 40 MG tablet; Take 1 tablet (40 mg total) by mouth once daily.  -     citalopram (CELEXA) 40 MG tablet; TAKE 1 TABLET BY MOUTH EVERY DAY         Med refill.  Follow-up with labs later in the year.  Conservative treatment for the right wrist pain.          Portions of this note may have been created with voice recognition software. Occasional "wrong-word" or "sound-a-like" substitutions may have occurred due to the inherent limitations of voice recognition software. Please, read the note carefully and recognize, using context, where substitutions have occurred.  "

## 2023-02-06 ENCOUNTER — HOSPITAL ENCOUNTER (OUTPATIENT)
Dept: RADIOLOGY | Facility: HOSPITAL | Age: 63
Discharge: HOME | End: 2023-02-06
Attending: INTERNAL MEDICINE
Payer: COMMERCIAL

## 2023-02-06 DIAGNOSIS — Z12.31 OTHER SCREENING MAMMOGRAM: ICD-10-CM

## 2023-02-06 PROCEDURE — 77063 MAMMO DIGITAL SCREENING BILAT WITH TOMO: ICD-10-PCS | Mod: 26,,, | Performed by: INTERNAL MEDICINE

## 2023-02-06 PROCEDURE — 77067 SCR MAMMO BI INCL CAD: CPT | Mod: TC

## 2023-02-06 PROCEDURE — 77067 MAMMO DIGITAL SCREENING BILAT WITH TOMO: ICD-10-PCS | Mod: 26,,, | Performed by: INTERNAL MEDICINE

## 2023-02-06 PROCEDURE — 77063 BREAST TOMOSYNTHESIS BI: CPT | Mod: 26,,, | Performed by: INTERNAL MEDICINE

## 2023-02-06 PROCEDURE — 77067 SCR MAMMO BI INCL CAD: CPT | Mod: 26,,, | Performed by: INTERNAL MEDICINE

## 2023-02-11 ENCOUNTER — PATIENT MESSAGE (OUTPATIENT)
Dept: INTERNAL MEDICINE | Facility: CLINIC | Age: 63
End: 2023-02-11
Payer: COMMERCIAL

## 2023-07-08 ENCOUNTER — LAB VISIT (OUTPATIENT)
Dept: LAB | Facility: HOSPITAL | Age: 63
End: 2023-07-08
Attending: INTERNAL MEDICINE
Payer: COMMERCIAL

## 2023-07-08 DIAGNOSIS — M25.531 RIGHT WRIST PAIN: ICD-10-CM

## 2023-07-08 DIAGNOSIS — F41.9 ANXIETY AND DEPRESSION: ICD-10-CM

## 2023-07-08 DIAGNOSIS — F41.9 ANXIETY: ICD-10-CM

## 2023-07-08 DIAGNOSIS — I10 PRIMARY HYPERTENSION: ICD-10-CM

## 2023-07-08 DIAGNOSIS — G47.00 INSOMNIA, UNSPECIFIED TYPE: ICD-10-CM

## 2023-07-08 DIAGNOSIS — F32.A ANXIETY AND DEPRESSION: ICD-10-CM

## 2023-07-08 LAB
ALBUMIN SERPL BCP-MCNC: 4.2 G/DL (ref 3.5–5.2)
ALP SERPL-CCNC: 75 U/L (ref 55–135)
ALT SERPL W/O P-5'-P-CCNC: 16 U/L (ref 10–44)
ANION GAP SERPL CALC-SCNC: 11 MMOL/L (ref 8–16)
AST SERPL-CCNC: 22 U/L (ref 10–40)
BILIRUB SERPL-MCNC: 0.6 MG/DL (ref 0.1–1)
BUN SERPL-MCNC: 10 MG/DL (ref 8–23)
CALCIUM SERPL-MCNC: 9.5 MG/DL (ref 8.7–10.5)
CHLORIDE SERPL-SCNC: 109 MMOL/L (ref 95–110)
CHOLEST SERPL-MCNC: 154 MG/DL (ref 120–199)
CHOLEST/HDLC SERPL: 2.7 {RATIO} (ref 2–5)
CO2 SERPL-SCNC: 25 MMOL/L (ref 23–29)
CREAT SERPL-MCNC: 0.8 MG/DL (ref 0.5–1.4)
EST. GFR  (NO RACE VARIABLE): >60 ML/MIN/1.73 M^2
ESTIMATED AVG GLUCOSE: 94 MG/DL (ref 68–131)
GLUCOSE SERPL-MCNC: 99 MG/DL (ref 70–110)
HBA1C MFR BLD: 4.9 % (ref 4–5.6)
HDLC SERPL-MCNC: 58 MG/DL (ref 40–75)
HDLC SERPL: 37.7 % (ref 20–50)
LDLC SERPL CALC-MCNC: 82 MG/DL (ref 63–159)
NONHDLC SERPL-MCNC: 96 MG/DL
POTASSIUM SERPL-SCNC: 4.6 MMOL/L (ref 3.5–5.1)
PROT SERPL-MCNC: 6.6 G/DL (ref 6–8.4)
SODIUM SERPL-SCNC: 145 MMOL/L (ref 136–145)
TRIGL SERPL-MCNC: 70 MG/DL (ref 30–150)
TSH SERPL DL<=0.005 MIU/L-ACNC: 1 UIU/ML (ref 0.4–4)

## 2023-07-08 PROCEDURE — 80053 COMPREHEN METABOLIC PANEL: CPT | Performed by: INTERNAL MEDICINE

## 2023-07-08 PROCEDURE — 36415 COLL VENOUS BLD VENIPUNCTURE: CPT | Performed by: INTERNAL MEDICINE

## 2023-07-08 PROCEDURE — 84443 ASSAY THYROID STIM HORMONE: CPT | Performed by: INTERNAL MEDICINE

## 2023-07-08 PROCEDURE — 83036 HEMOGLOBIN GLYCOSYLATED A1C: CPT | Performed by: INTERNAL MEDICINE

## 2023-07-08 PROCEDURE — 80061 LIPID PANEL: CPT | Performed by: INTERNAL MEDICINE

## 2023-07-11 ENCOUNTER — OFFICE VISIT (OUTPATIENT)
Dept: INTERNAL MEDICINE | Facility: CLINIC | Age: 63
End: 2023-07-11
Payer: COMMERCIAL

## 2023-07-11 DIAGNOSIS — I10 PRIMARY HYPERTENSION: Primary | ICD-10-CM

## 2023-07-11 DIAGNOSIS — F41.9 ANXIETY: ICD-10-CM

## 2023-07-11 DIAGNOSIS — G47.00 INSOMNIA, UNSPECIFIED TYPE: ICD-10-CM

## 2023-07-11 DIAGNOSIS — E78.5 HYPERLIPIDEMIA, UNSPECIFIED HYPERLIPIDEMIA TYPE: ICD-10-CM

## 2023-07-11 PROCEDURE — 99214 PR OFFICE/OUTPT VISIT, EST, LEVL IV, 30-39 MIN: ICD-10-PCS | Mod: 95,,, | Performed by: INTERNAL MEDICINE

## 2023-07-11 PROCEDURE — 1160F PR REVIEW ALL MEDS BY PRESCRIBER/CLIN PHARMACIST DOCUMENTED: ICD-10-PCS | Mod: CPTII,95,, | Performed by: INTERNAL MEDICINE

## 2023-07-11 PROCEDURE — 1159F MED LIST DOCD IN RCRD: CPT | Mod: CPTII,95,, | Performed by: INTERNAL MEDICINE

## 2023-07-11 PROCEDURE — 3044F HG A1C LEVEL LT 7.0%: CPT | Mod: CPTII,95,, | Performed by: INTERNAL MEDICINE

## 2023-07-11 PROCEDURE — 99214 OFFICE O/P EST MOD 30 MIN: CPT | Mod: 95,,, | Performed by: INTERNAL MEDICINE

## 2023-07-11 PROCEDURE — 1159F PR MEDICATION LIST DOCUMENTED IN MEDICAL RECORD: ICD-10-PCS | Mod: CPTII,95,, | Performed by: INTERNAL MEDICINE

## 2023-07-11 PROCEDURE — 3044F PR MOST RECENT HEMOGLOBIN A1C LEVEL <7.0%: ICD-10-PCS | Mod: CPTII,95,, | Performed by: INTERNAL MEDICINE

## 2023-07-11 PROCEDURE — 1160F RVW MEDS BY RX/DR IN RCRD: CPT | Mod: CPTII,95,, | Performed by: INTERNAL MEDICINE

## 2023-07-11 RX ORDER — LORAZEPAM 1 MG/1
TABLET ORAL
Qty: 30 TABLET | Refills: 5 | Status: SHIPPED | OUTPATIENT
Start: 2023-07-11

## 2023-07-11 RX ORDER — CITALOPRAM 40 MG/1
TABLET, FILM COATED ORAL
Qty: 90 TABLET | Refills: 12 | Status: SHIPPED | OUTPATIENT
Start: 2023-07-11

## 2023-07-11 NOTE — PROGRESS NOTES
Subjective:       Patient ID: Marielena Tracy is a 62 y.o. female.    Chief Complaint: Follow-up      The patient location is: [place]  The chief complaint leading to consultation is: Med follow up     Visit type: audiovisual    Face to Face time with patient: 15 minutes  21 minutes of total time spent on the encounter, which includes face to face time and non-face to face time preparing to see the patient (eg, review of tests), Obtaining and/or reviewing separately obtained history, Documenting clinical information in the electronic or other health record, Independently interpreting results (not separately reported) and communicating results to the patient/family/caregiver, or Care coordination (not separately reported).         Each patient to whom he or she provides medical services by telemedicine is:  (1) informed of the relationship between the physician and patient and the respective role of any other health care provider with respect to management of the patient; and (2) notified that he or she may decline to receive medical services by telemedicine and may withdraw from such care at any time.    Notes:     HPI: Patient seen virtually to review labs for blood pressure and review meds for anxiety.  She feels her anxiety and worry or maybe a little worse and she would like to consider seeing a therapist or counselor.  She said she sometimes just gets anxious and can even start crying over something simple is messing up at dinner.     reviewed.  No escalation.  She has great family support from her  and family but she says it is frustrating because she is doing well physically, losing weight, working in the [employer], having good labs.  She says she can laugh and joke about it but she would like to see someone for assistance.  We discussed possibly the behavioral health program but she would like to check with her insurance about coverage for mental health type issues.  Prescriptions refilled after  review.    Review of Systems   Constitutional:  Negative for activity change and unexpected weight change.   HENT:  Negative for hearing loss, rhinorrhea and trouble swallowing.    Eyes:  Negative for discharge and visual disturbance.   Respiratory:  Negative for chest tightness and wheezing.    Cardiovascular:  Negative for chest pain and palpitations.   Gastrointestinal:  Negative for blood in stool, constipation, diarrhea and vomiting.   Endocrine: Negative for polydipsia and polyuria.   Genitourinary:  Negative for difficulty urinating, dysuria, hematuria and menstrual problem.   Musculoskeletal:  Negative for neck pain.   Neurological:  Negative for weakness and headaches.   Psychiatric/Behavioral:  Negative for confusion and dysphoric mood. The patient is nervous/anxious.        Objective:      Physical Exam  Constitutional:       Appearance: Normal appearance.   Pulmonary:      Effort: No respiratory distress.   Neurological:      General: No focal deficit present.      Mental Status: She is alert.   Psychiatric:         Mood and Affect: Mood normal.         Thought Content: Thought content normal.       Assessment:       Problem List Items Addressed This Visit          Cardiac/Vascular    HTN (hypertension) - Primary     Other Visit Diagnoses       Insomnia, unspecified type        Relevant Medications    LORazepam (ATIVAN) 1 MG tablet    Anxiety        Relevant Medications    LORazepam (ATIVAN) 1 MG tablet            Plan:       Marielena was seen today for follow-up.    Diagnoses and all orders for this visit:    Primary hypertension    Insomnia, unspecified type  -     LORazepam (ATIVAN) 1 MG tablet; 1 tablet nightly as needed    Anxiety  -     LORazepam (ATIVAN) 1 MG tablet; 1 tablet nightly as needed    Other orders  -     citalopram (CELEXA) 40 MG tablet; TAKE 1 TABLET BY MOUTH EVERY DAY         Patient will update me how we can assist with some mental health referrals.  Continue meds.  Excellent job  "with weight and labs  Follow-up in a few months          Portions of this note may have been created with voice recognition software. Occasional "wrong-word" or "sound-a-like" substitutions may have occurred due to the inherent limitations of voice recognition software. Please, read the note carefully and recognize, using context, where substitutions have occurred.  "

## 2023-10-26 ENCOUNTER — PATIENT MESSAGE (OUTPATIENT)
Dept: INTERNAL MEDICINE | Facility: CLINIC | Age: 63
End: 2023-10-26
Payer: COMMERCIAL

## 2024-02-21 DIAGNOSIS — Z12.31 OTHER SCREENING MAMMOGRAM: ICD-10-CM

## 2024-03-14 ENCOUNTER — HOSPITAL ENCOUNTER (OUTPATIENT)
Dept: RADIOLOGY | Facility: HOSPITAL | Age: 64
Discharge: HOME | End: 2024-03-14
Attending: INTERNAL MEDICINE
Payer: COMMERCIAL

## 2024-03-14 VITALS — WEIGHT: 160 LBS | BODY MASS INDEX: 27.46 KG/M2

## 2024-03-14 DIAGNOSIS — Z12.31 OTHER SCREENING MAMMOGRAM: ICD-10-CM

## 2024-03-14 PROCEDURE — 77067 SCR MAMMO BI INCL CAD: CPT | Mod: 26,,, | Performed by: RADIOLOGY

## 2024-03-14 PROCEDURE — 77063 BREAST TOMOSYNTHESIS BI: CPT | Mod: 26,,, | Performed by: RADIOLOGY

## 2024-03-14 PROCEDURE — 77067 SCR MAMMO BI INCL CAD: CPT | Mod: TC

## 2024-03-21 ENCOUNTER — PATIENT MESSAGE (OUTPATIENT)
Dept: INTERNAL MEDICINE | Facility: CLINIC | Age: 64
End: 2024-03-21
Payer: COMMERCIAL

## 2024-04-02 DIAGNOSIS — G47.00 INSOMNIA, UNSPECIFIED TYPE: ICD-10-CM

## 2024-04-02 NOTE — TELEPHONE ENCOUNTER
No care due was identified.  [facility] Embedded Care Due Messages. Reference number: [id number].   4/02/2024 11:33:56 AM CDT

## 2024-04-03 DIAGNOSIS — G47.00 INSOMNIA, UNSPECIFIED TYPE: ICD-10-CM

## 2024-04-03 RX ORDER — SIMVASTATIN 40 MG/1
40 TABLET, FILM COATED ORAL DAILY
Qty: 90 TABLET | Refills: 1 | Status: SHIPPED | OUTPATIENT
Start: 2024-04-03

## 2024-04-03 RX ORDER — SIMVASTATIN 40 MG/1
40 TABLET, FILM COATED ORAL
Qty: 90 TABLET | Refills: 4 | OUTPATIENT
Start: 2024-04-03

## 2024-04-03 RX ORDER — HYDROXYZINE HYDROCHLORIDE 50 MG/1
TABLET, FILM COATED ORAL
Qty: 90 TABLET | Refills: 4 | Status: SHIPPED | OUTPATIENT
Start: 2024-04-03

## 2024-04-03 RX ORDER — HYDROXYZINE HYDROCHLORIDE 50 MG/1
50 TABLET, FILM COATED ORAL NIGHTLY PRN
Qty: 90 TABLET | Refills: 4 | Status: SHIPPED | OUTPATIENT
Start: 2024-04-03

## 2024-04-03 NOTE — TELEPHONE ENCOUNTER
Refill Routing Note   Medication(s) are not appropriate for processing by [facility] for the following reason(s):        Outside of protocol    [facility] action(s):  Route  Approve               Appointments  past 12m or future 3m with PCP    Date Provider   Last Visit   7/11/2023 Delroy Gregory MD   Next Visit   4/3/2024 Delroy Gregory MD   ED visits in past 90 days: 0        Note composed:2:37 PM 04/03/2024

## 2024-04-03 NOTE — TELEPHONE ENCOUNTER
Refill Routing Note   Medication(s) are not appropriate for processing by [facility] for the following reason(s):        Outside of protocol    [facility] action(s):  Route  Quick Discontinue        Medication Therapy Plan: Duplicate    Pharmacist review requested: Yes     Appointments  past 12m or future 3m with PCP    Date Provider   Last Visit   7/11/2023 Delroy Gregory MD   Next Visit   Visit date not found Delroy Gregory MD   ED visits in past 90 days: 0        Note composed:2:38 PM 04/03/2024

## 2024-04-03 NOTE — TELEPHONE ENCOUNTER
No care due was identified.  [facility] Embedded Care Due Messages. Reference number: [id number].   4/03/2024 12:51:42 AM CDT

## 2024-05-08 ENCOUNTER — OFFICE VISIT (OUTPATIENT)
Dept: URGENT CARE | Facility: CLINIC | Age: 64
End: 2024-05-08
Payer: COMMERCIAL

## 2024-05-08 VITALS
BODY MASS INDEX: 27.46 KG/M2 | WEIGHT: 160 LBS | HEART RATE: 83 BPM | OXYGEN SATURATION: 98 % | RESPIRATION RATE: 16 BRPM | TEMPERATURE: 98 F | SYSTOLIC BLOOD PRESSURE: 157 MMHG | DIASTOLIC BLOOD PRESSURE: 86 MMHG
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
## 2024-05-08 DIAGNOSIS — H65.03 NON-RECURRENT ACUTE SEROUS OTITIS MEDIA OF BOTH EARS: ICD-10-CM

## 2024-05-08 DIAGNOSIS — S51.851A CAT BITE OF FOREARM, RIGHT, INITIAL ENCOUNTER: Primary | ICD-10-CM

## 2024-05-08 DIAGNOSIS — J30.89 NON-SEASONAL ALLERGIC RHINITIS DUE TO OTHER ALLERGIC TRIGGER: ICD-10-CM

## 2024-05-08 PROCEDURE — 90715 TDAP VACCINE 7 YRS/> IM: CPT | Mod: S$GLB,,, | Performed by: PHYSICIAN ASSISTANT

## 2024-05-08 PROCEDURE — 90471 IMMUNIZATION ADMIN: CPT | Mod: S$GLB,,, | Performed by: PHYSICIAN ASSISTANT

## 2024-05-08 PROCEDURE — 99214 OFFICE O/P EST MOD 30 MIN: CPT | Mod: 25,S$GLB,, | Performed by: PHYSICIAN ASSISTANT

## 2024-05-08 RX ORDER — AMOXICILLIN AND CLAVULANATE POTASSIUM 875; 125 MG/1; MG/1
1 TABLET, FILM COATED ORAL 2 TIMES DAILY
Qty: 20 TABLET | Refills: 0 | Status: SHIPPED | OUTPATIENT
Start: 2024-05-08 | End: 2024-05-18

## 2024-05-08 RX ORDER — IBUPROFEN 800 MG/1
800 TABLET ORAL EVERY 6 HOURS PRN
Qty: 30 TABLET | Refills: 0 | Status: SHIPPED | OUTPATIENT
Start: 2024-05-09 | End: 2024-05-19

## 2024-05-08 NOTE — PATIENT INSTRUCTIONS
Always wash your hands before and after touching the wound.  Each time you change the dressing, look closely at the wound to be sure it is healing the right way. The wound may have a yellowish discharge, and this is normal.  Avoid picking the scab or scratching the site which may cause more irritation.  Do not soak in water or swim with an open wound.  Do not use hydrogen peroxide; it will cause the wound to dry out or delay wound healing/new skin growth.  Please complete full course of oral antibiotics.     If not allergic, take Tylenol (Acetaminophen) 650 mg to  1 g every 6 hours as needed as needed for fever/pain and/or Motrin (Ibuprofen) 600 to 800 mg every 6 hours as needed for pain and/or fever    _____________________________________________  Recommend oral antihistamine (Claritin/zyrtec) +/- oral decongestant (pseudoephedrine) for rhinorrhea, steroid nasal spray (flonase) ,Tylenol (Acetaminophen) and/or Motrin (Ibuprofen) as directed for control of pain and/or fever.    For nose bleeds; recommend nasal irrigation with a saline spray/gel (AYR nasal gel) or Netti Pot like device per their directions is also recommended.  Please drink plenty of fluids.  Please get plenty of rest.  If you  smoke, please stop smoking.      Discussed prescriptions and over-the-counter medicines to help with patient's symptoms:  A steroid nose spray (flonase) can help with a stuffy nose. It can also help with drainage down the back of your throat.  An antihistamine (loratadine,zyrtec,allegra, xyzal) can help with itching, sneezing, or runny nose.  An antihistamine eye drop can help with itchy eyes.  A decongestant (pseudoephedrine,  Phenylephrine) can help with a stuffy nose. Take <10 days for congestion and rhinorrhea. Once symptoms improve, proceed with loratadine/zyrtec once a day. These ingredients can keep you up all night, decrease appetite, feel jittery, and raise blood pressure with long term  use.      ______________________________________________________________  Please remember that you have received care at an urgent care today. Urgent cares are not emergency rooms and are not equipped to handle life threatening emergencies and cannot rule in or out certain medical conditions and you may be released before all of your medical problems are known or treated. Please arrange follow up with your primary care physician or speciality clinic  within 2-5 days if your signs and symptoms have not resolved or worsen. Patient can call our Referral Hotline at [phone] to make an appointment.    Please return here or go to the Emergency Department for any concerns or worsening of condition.Patient was educated on signs/symptoms that would warrant emergent medical attention. Patient verbalized understanding.  Signs of infection. These include a fever of 100.4°F (38°C) or higher, chills, or wound that will not heal.  The pain in and around the area gets much worse.  There is a bad smell or pus (thick yellow, green, or gray fluid) coming from your wound.  You notice a crunchy feeling or blisters in the skin around the wound.  The redness around your wound gets bigger or is spreading up your arm or leg.  Fluid that is not pus drains from your wound.  Your swelling doesnt improve or gets worse.

## 2024-05-08 NOTE — LETTER
"  May 8, 2024      [facility]  [address]  [facility] [address]  Phone: [phone]  Fax: [phone]       Patient: Marielena Tracy   YOB: 1960  Date of Visit: 05/08/2024    To Whom It May Concern:    Sophy Tracy  was at [facility] on 05/08/2024. The patient may return to work/school on 5/9/24 with no restrictions. If you have any questions or concerns, or if I can be of further assistance, please do not hesitate to contact me.    Sincerely,        Jasmineasif Carney PA-C (Jackie)       "

## 2024-05-08 NOTE — PROGRESS NOTES
Subjective:      Patient ID: Marielena Tracy is a 63 y.o. female.    Vitals:  weight is 72.6 kg (160 lb). Her oral temperature is 98.4 °F (36.9 °C). Her blood pressure is 157/86 (abnormal) and her pulse is 83. Her respiration is 16 and oxygen saturation is 98%.     Chief Complaint: Animal Bite    Marielena Tracy is a 63 y.o. female who complains of  cat bite (from pt's cat) to R forearm yesterday morning. Pt says cat is fully vaccinated. S/S of infection: redness, swelling, px, heat, no pus drainage. Swelling and redness is approx 17cm in length. She has 3 visible puncture wounds.    Home tx: wash, peroxide, AAA ointment, ice    [facility]: Tetanus not UTD    Animal Bite   The incident occurred yesterday. The incident occurred at home. There is an injury to the Right forearm. The pain is moderate. It is unlikely that a foreign body is present. Pertinent negatives include no chest pain, no abdominal pain, no nausea, no vomiting, no headaches, no neck pain and no cough. Her tetanus status is out of date.       Constitution: Negative for chills, fatigue and fever.   HENT:  Negative for congestion, postnasal drip, sinus pain, sinus pressure, sore throat, trouble swallowing and voice change.    Neck: Negative for neck pain and neck stiffness.   Cardiovascular:  Negative for chest pain, leg swelling, palpitations and sob on exertion.   Eyes:  Negative for eye itching and eye redness.   Respiratory:  Negative for cough, sputum production, shortness of breath, wheezing and asthma.    Gastrointestinal:  Negative for abdominal pain, nausea and vomiting.   Musculoskeletal:  Positive for pain and muscle ache.   Skin:  Positive for lesion, puncture wound and erythema. Negative for rash.   Allergic/Immunologic: Positive for environmental allergies and seasonal allergies. Negative for asthma, itching and sneezing.   Neurological:  Negative for headaches.   Psychiatric/Behavioral:  Positive for nervous/anxious. The patient is  nervous/anxious.       Objective:     Physical Exam   Constitutional: She is oriented to person, place, and time. No distress.      Comments:Patient is awake and alert, sitting up in exam chair, speaking and answering in complete sentences     normal  HENT:   Head: Normocephalic and atraumatic.   Ears:   Right Ear: External ear and ear canal normal. A middle ear effusion is present.   Left Ear: External ear and ear canal normal. A middle ear effusion is present.   Nose: Nose normal.   Mouth/Throat: Mucous membranes are moist. Oropharynx is clear.   Eyes: Conjunctivae are normal. Pupils are equal, round, and reactive to light. Extraocular movement intact   Neck: Neck supple.   Cardiovascular: Normal rate, regular rhythm, normal heart sounds and normal pulses.   Pulmonary/Chest: Effort normal and breath sounds normal.   Abdominal: Normal appearance.   Musculoskeletal:         General: Swelling and tenderness present.      Comments: Decreased ROM of right wrist; normal ROM of right fingers   Neurological: She is alert and oriented to person, place, and time.   Skin: Capillary refill takes less than 2 seconds. erythema and lesion         Comments: Right forearm with 3 puncture wounds with surrounding erythema, tenderness, and warmth to area; no purulent drainage noted   Psychiatric: Her behavior is normal. Mood, judgment and thought content normal.   Nursing note and vitals reviewed.      Assessment:     1. Cat bite of forearm, right, initial encounter    2. Non-seasonal allergic rhinitis due to other allergic trigger    3. Non-recurrent acute serous otitis media of both ears      Patient presents with clinical exam findings and history consistent with above.      On exam, patient is nontoxic appearing and vitals are stable.      Diagnostic testing results were reviewed and discussed with patient/guardian.   Tests ordered in clinic: None  Previous progress notes/admissions/labs and medications were reviewed.      Plan:  "  Recommend claritin and flonase for seasonal allergies. Line was drawn on right forearm for pt to monitor for redness. Discussed it is okay to use compression glove to help with pain; recommend ibuprofen 800 mg every 6 to 8 hours pain pain.     Cat bite of forearm, right, initial encounter  -     Tdap (BOOSTRIX) vaccine injection 0.5 mL  -     amoxicillin-clavulanate 875-125mg (AUGMENTIN) 875-125 mg per tablet; Take 1 tablet by mouth 2 (two) times daily. for 10 days  Dispense: 20 tablet; Refill: 0  -     ibuprofen (ADVIL,MOTRIN) 800 MG tablet; Take 1 tablet (800 mg total) by mouth every 6 (six) hours as needed for Pain or Temperature greater than (100.4).  Dispense: 30 tablet; Refill: 0  -     Ambulatory referral/consult to Internal Medicine    Non-seasonal allergic rhinitis due to other allergic trigger    Non-recurrent acute serous otitis media of both ears                    1) See orders for this visit as documented in the electronic medical record.  2) Symptomatic therapy suggested: use acetaminophen/ibuprofen every 6-8 hours prn pain or fever, push fluids.   3) Call or return to clinic prn if these symptoms worsen or fail to improve as anticipated.    Discussed results/diagnosis/plan with patient in clinic.  We had shared decision making for patient's treatment. Patient verbalized understanding and in agreement with current treatment plan.     Patient was instructed to return for re-evaluation with urgent care or PCP for continued outpatient workup and management if symptoms do not improve/worsening symptoms. Strict ED versus clinic precautions given in depth.    Discharge and follow-up instructions given verbally/printed with the patient who expressed understanding. The instructions and results are also available on [employer].              Jasmine "Katja" NANCY Carney          Patient Instructions     Always wash your hands before and after touching the wound.  Each time you change the dressing, look closely at " the wound to be sure it is healing the right way. The wound may have a yellowish discharge, and this is normal.  Avoid picking the scab or scratching the site which may cause more irritation.  Do not soak in water or swim with an open wound.  Do not use hydrogen peroxide; it will cause the wound to dry out or delay wound healing/new skin growth.  Please complete full course of oral antibiotics.     If not allergic, take Tylenol (Acetaminophen) 650 mg to  1 g every 6 hours as needed as needed for fever/pain and/or Motrin (Ibuprofen) 600 to 800 mg every 6 hours as needed for pain and/or fever    _____________________________________________  Recommend oral antihistamine (Claritin/zyrtec) +/- oral decongestant (pseudoephedrine) for rhinorrhea, steroid nasal spray (flonase) ,Tylenol (Acetaminophen) and/or Motrin (Ibuprofen) as directed for control of pain and/or fever.    For nose bleeds; recommend nasal irrigation with a saline spray/gel (AYR nasal gel) or Netti Pot like device per their directions is also recommended.  Please drink plenty of fluids.  Please get plenty of rest.  If you  smoke, please stop smoking.      Discussed prescriptions and over-the-counter medicines to help with patient's symptoms:  A steroid nose spray (flonase) can help with a stuffy nose. It can also help with drainage down the back of your throat.  An antihistamine (loratadine,zyrtec,allegra, xyzal) can help with itching, sneezing, or runny nose.  An antihistamine eye drop can help with itchy eyes.  A decongestant (pseudoephedrine,  Phenylephrine) can help with a stuffy nose. Take <10 days for congestion and rhinorrhea. Once symptoms improve, proceed with loratadine/zyrtec once a day. These ingredients can keep you up all night, decrease appetite, feel jittery, and raise blood pressure with long term use.      ______________________________________________________________  Please remember that you have received care at an urgent care today.  Urgent cares are not emergency rooms and are not equipped to handle life threatening emergencies and cannot rule in or out certain medical conditions and you may be released before all of your medical problems are known or treated. Please arrange follow up with your primary care physician or speciality clinic  within 2-5 days if your signs and symptoms have not resolved or worsen. Patient can call our Referral Hotline at [phone] to make an appointment.    Please return here or go to the Emergency Department for any concerns or worsening of condition.Patient was educated on signs/symptoms that would warrant emergent medical attention. Patient verbalized understanding.  Signs of infection. These include a fever of 100.4°F (38°C) or higher, chills, or wound that will not heal.  The pain in and around the area gets much worse.  There is a bad smell or pus (thick yellow, green, or gray fluid) coming from your wound.  You notice a crunchy feeling or blisters in the skin around the wound.  The redness around your wound gets bigger or is spreading up your arm or leg.  Fluid that is not pus drains from your wound.  Your swelling doesnt improve or gets worse.

## 2024-06-10 ENCOUNTER — PATIENT MESSAGE (OUTPATIENT)
Dept: INTERNAL MEDICINE | Facility: CLINIC | Age: 64
End: 2024-06-10
Payer: COMMERCIAL

## 2024-07-17 DIAGNOSIS — I10 HTN (HYPERTENSION): ICD-10-CM

## 2024-09-26 RX ORDER — SIMVASTATIN 40 MG/1
40 TABLET, FILM COATED ORAL
Qty: 90 TABLET | Refills: 0 | Status: SHIPPED | OUTPATIENT
Start: 2024-09-26

## 2024-09-26 NOTE — TELEPHONE ENCOUNTER
Care Due:                  Date            Visit Type   Department     Provider  --------------------------------------------------------------------------------                                ESTABLISHED                              PATIENT -    [facility]  Last Visit: 07-      [facility]       Delroy Gregory  Next Visit: None Scheduled  None         None Found                                                            Last  Test          Frequency    Reason                     Performed    Due Date  --------------------------------------------------------------------------------    Office Visit  15 months..  citalopram, simvastatin..  07-   10-    CMP.........  12 months..  simvastatin..............  07- 07-    Lipid Panel.  12 months..  simvastatin..............  07- 07-    [employer] Embedded Care Due Messages. Reference number: [id number].   9/26/2024 12:31:49 AM CDT

## 2024-09-26 NOTE — TELEPHONE ENCOUNTER
Refill Routing Note   Medication(s) are not appropriate for processing by [facility] for the following reason(s):        Required labs outdated    [facility] action(s):  Defer   Requires appointment : Yes     Requires labs : Yes             Appointments  past 12m or future 3m with PCP    Date Provider   Last Visit   7/11/2023 Delroy Gregory MD   Next Visit   Visit date not found Delroy Gregory MD   ED visits in past 90 days: 0        Note composed:2:38 PM 09/26/2024

## 2024-09-27 RX ORDER — CITALOPRAM 40 MG/1
TABLET, FILM COATED ORAL
Qty: 90 TABLET | Refills: 0 | Status: SHIPPED | OUTPATIENT
Start: 2024-09-27

## 2024-09-27 NOTE — TELEPHONE ENCOUNTER
Refill Decision Note   Marielena Tracy  is requesting a refill authorization.  Brief Assessment and Rationale for Refill:  Approve     Medication Therapy Plan:         Comments:     Note composed:12:50 PM 09/27/2024             Appointments     Last Visit   Visit date not found Delroy Gregory MD   Next Visit   Visit date not found Delroy Gregory MD

## 2024-09-27 NOTE — TELEPHONE ENCOUNTER
No care due was identified.  [facility] Embedded Care Due Messages. Reference number: [id number].   9/26/2024 7:37:06 PM CDT

## 2024-11-18 ENCOUNTER — PATIENT MESSAGE (OUTPATIENT)
Dept: ADMINISTRATIVE | Facility: HOSPITAL | Age: 64
End: 2024-11-18
Payer: COMMERCIAL

## 2024-12-24 NOTE — LETTER
May 2, 2019      Delroy Gregory MD  [address]  [place] [facility] [address]           [facility]  [address]  [facility] [address]  Phone: [phone]          Patient: Marielena Tracy   MR Number: [id number]   YOB: 1960   Date of Visit: 4/30/2019       Dear Dr. Delroy Gregory:    Thank you for referring Marielena Tracy to me for evaluation. Attached you will find relevant portions of my assessment and plan of care.    If you have questions, please do not hesitate to call me. I look forward to following Marielena Tracy along with you.    Sincerely,    Britton Priest, DIANN    Enclosure  CC:  No Recipients    If you would like to receive this communication electronically, please contact [email or web address] or [phone] to request more information on [employer] access.    For providers and/or their staff who would like to refer a patient to [facility], please contact us through our one-stop-shop provider referral line, [facility] , at [phone].    If you feel you have received this communication in error or would no longer like to receive these types of communications, please e-mail [email or web address]         
Heterosexual

## 2024-12-25 NOTE — TELEPHONE ENCOUNTER
Care Due:                  Date            Visit Type   Department     Provider  --------------------------------------------------------------------------------                                ESTABLISHED                              PATIENT -    [facility]  Last Visit: 07-      [facility]       Delroy Gregory  Next Visit: None Scheduled  None         None Found                                                            Last  Test          Frequency    Reason                     Performed    Due Date  --------------------------------------------------------------------------------    Office Visit  15 months..  citalopram, simvastatin..  07-   10-    CMP.........  12 months..  simvastatin..............  07- 07-    Lipid Panel.  12 months..  simvastatin..............  07- 07-    [employer] Embedded Care Due Messages. Reference number: [id number].   12/25/2024 12:21:15 AM CST

## 2024-12-26 RX ORDER — CITALOPRAM 40 MG/1
TABLET, FILM COATED ORAL
Qty: 90 TABLET | Refills: 0 | Status: SHIPPED | OUTPATIENT
Start: 2024-12-26

## 2024-12-26 RX ORDER — SIMVASTATIN 40 MG/1
40 TABLET, FILM COATED ORAL
Qty: 90 TABLET | Refills: 0 | Status: SHIPPED | OUTPATIENT
Start: 2024-12-26

## 2024-12-26 NOTE — TELEPHONE ENCOUNTER
Refill Routing Note   Medication(s) are not appropriate for processing by [facility] for the following reason(s):        Required labs outdated  Patient not seen by provider within 15 months    [facility] action(s):  Defer   Requires appointment : Yes     Requires labs : Yes             Appointments  past 12m or future 3m with PCP    Date Provider   Last Visit   7/11/2023 Delroy Gregory MD   Next Visit   Visit date not found Delroy Gregory MD   ED visits in past 90 days: 0        Note composed:8:10 AM 12/26/2024

## 2025-02-24 DIAGNOSIS — Z00.00 ENCOUNTER FOR MEDICARE ANNUAL WELLNESS EXAM: ICD-10-CM

## 2025-03-23 NOTE — TELEPHONE ENCOUNTER
Care Due:                  Date            Visit Type   Department     Provider  --------------------------------------------------------------------------------                                ESTABLISHED                              PATIENT -    [facility]  Last Visit: 07-      [facility]       Delroy Gregory  Next Visit: None Scheduled  None         None Found                                                            Last  Test          Frequency    Reason                     Performed    Due Date  --------------------------------------------------------------------------------    Office Visit  15 months..  citalopram, simvastatin..  07-   10-    CMP.........  12 months..  simvastatin..............  07- 07-    Lipid Panel.  12 months..  simvastatin..............  07- 07-    [employer] Embedded Care Due Messages. Reference number: [id number].   3/23/2025 7:09:26 AM CDT

## 2025-03-24 RX ORDER — CITALOPRAM 40 MG/1
40 TABLET, FILM COATED ORAL
Qty: 90 TABLET | Refills: 0 | Status: SHIPPED | OUTPATIENT
Start: 2025-03-24

## 2025-03-24 RX ORDER — SIMVASTATIN 40 MG/1
40 TABLET, FILM COATED ORAL
Qty: 90 TABLET | Refills: 0 | Status: SHIPPED | OUTPATIENT
Start: 2025-03-24

## 2025-04-16 ENCOUNTER — OFFICE VISIT (OUTPATIENT)
Dept: INTERNAL MEDICINE | Facility: CLINIC | Age: 65
End: 2025-04-16
Payer: MEDICARE

## 2025-04-16 ENCOUNTER — LAB VISIT (OUTPATIENT)
Dept: LAB | Facility: HOSPITAL | Age: 65
End: 2025-04-16
Attending: INTERNAL MEDICINE
Payer: MEDICARE

## 2025-04-16 VITALS
BODY MASS INDEX: 27.28 KG/M2 | OXYGEN SATURATION: 98 % | HEART RATE: 82 BPM | WEIGHT: 159.81 LBS | DIASTOLIC BLOOD PRESSURE: 86 MMHG | SYSTOLIC BLOOD PRESSURE: 138 MMHG | HEIGHT: 64 IN

## 2025-04-16 DIAGNOSIS — F41.9 ANXIETY: ICD-10-CM

## 2025-04-16 DIAGNOSIS — Z12.31 ENCOUNTER FOR SCREENING MAMMOGRAM FOR MALIGNANT NEOPLASM OF BREAST: ICD-10-CM

## 2025-04-16 DIAGNOSIS — Z12.11 COLON CANCER SCREENING: ICD-10-CM

## 2025-04-16 DIAGNOSIS — Z00.00 ROUTINE PHYSICAL EXAMINATION: ICD-10-CM

## 2025-04-16 DIAGNOSIS — R73.09 ELEVATED GLUCOSE LEVEL: ICD-10-CM

## 2025-04-16 DIAGNOSIS — E78.5 HYPERLIPIDEMIA, UNSPECIFIED HYPERLIPIDEMIA TYPE: ICD-10-CM

## 2025-04-16 DIAGNOSIS — Z00.00 ROUTINE PHYSICAL EXAMINATION: Primary | ICD-10-CM

## 2025-04-16 LAB
ALBUMIN SERPL BCP-MCNC: 4 G/DL (ref 3.5–5.2)
ALP SERPL-CCNC: 87 UNIT/L (ref 40–150)
ALT SERPL W/O P-5'-P-CCNC: 11 UNIT/L (ref 10–44)
ANION GAP (OHS): 10 MMOL/L (ref 8–16)
AST SERPL-CCNC: 19 UNIT/L (ref 11–45)
BILIRUB SERPL-MCNC: 0.5 MG/DL (ref 0.1–1)
BUN SERPL-MCNC: 12 MG/DL (ref 8–23)
CALCIUM SERPL-MCNC: 9.1 MG/DL (ref 8.7–10.5)
CHLORIDE SERPL-SCNC: 107 MMOL/L (ref 95–110)
CHOLEST SERPL-MCNC: 138 MG/DL (ref 120–199)
CHOLEST/HDLC SERPL: 2.7 {RATIO} (ref 2–5)
CO2 SERPL-SCNC: 24 MMOL/L (ref 23–29)
CREAT SERPL-MCNC: 0.8 MG/DL (ref 0.5–1.4)
EAG (OHS): 100 MG/DL (ref 68–131)
GFR SERPLBLD CREATININE-BSD FMLA CKD-EPI: >60 ML/MIN/1.73/M2
GLUCOSE SERPL-MCNC: 88 MG/DL (ref 70–110)
HBA1C MFR BLD: 5.1 % (ref 4–5.6)
HDLC SERPL-MCNC: 52 MG/DL (ref 40–75)
HDLC SERPL: 37.7 % (ref 20–50)
LDLC SERPL CALC-MCNC: 69.8 MG/DL (ref 63–159)
NONHDLC SERPL-MCNC: 86 MG/DL
POTASSIUM SERPL-SCNC: 4.6 MMOL/L (ref 3.5–5.1)
PROT SERPL-MCNC: 6.6 GM/DL (ref 6–8.4)
SODIUM SERPL-SCNC: 141 MMOL/L (ref 136–145)
TRIGL SERPL-MCNC: 81 MG/DL (ref 30–150)
TSH SERPL-ACNC: 0.98 UIU/ML (ref 0.4–4)

## 2025-04-16 PROCEDURE — 3008F BODY MASS INDEX DOCD: CPT | Mod: CPTII,S$GLB,, | Performed by: INTERNAL MEDICINE

## 2025-04-16 PROCEDURE — 84443 ASSAY THYROID STIM HORMONE: CPT

## 2025-04-16 PROCEDURE — 1159F MED LIST DOCD IN RCRD: CPT | Mod: CPTII,S$GLB,, | Performed by: INTERNAL MEDICINE

## 2025-04-16 PROCEDURE — 83718 ASSAY OF LIPOPROTEIN: CPT

## 2025-04-16 PROCEDURE — 1160F RVW MEDS BY RX/DR IN RCRD: CPT | Mod: CPTII,S$GLB,, | Performed by: INTERNAL MEDICINE

## 2025-04-16 PROCEDURE — 3075F SYST BP GE 130 - 139MM HG: CPT | Mod: CPTII,S$GLB,, | Performed by: INTERNAL MEDICINE

## 2025-04-16 PROCEDURE — 83036 HEMOGLOBIN GLYCOSYLATED A1C: CPT

## 2025-04-16 PROCEDURE — 1101F PT FALLS ASSESS-DOCD LE1/YR: CPT | Mod: CPTII,S$GLB,, | Performed by: INTERNAL MEDICINE

## 2025-04-16 PROCEDURE — 82040 ASSAY OF SERUM ALBUMIN: CPT

## 2025-04-16 PROCEDURE — 1126F AMNT PAIN NOTED NONE PRSNT: CPT | Mod: CPTII,S$GLB,, | Performed by: INTERNAL MEDICINE

## 2025-04-16 PROCEDURE — 3288F FALL RISK ASSESSMENT DOCD: CPT | Mod: CPTII,S$GLB,, | Performed by: INTERNAL MEDICINE

## 2025-04-16 PROCEDURE — 3079F DIAST BP 80-89 MM HG: CPT | Mod: CPTII,S$GLB,, | Performed by: INTERNAL MEDICINE

## 2025-04-16 PROCEDURE — 36415 COLL VENOUS BLD VENIPUNCTURE: CPT

## 2025-04-16 PROCEDURE — 99397 PER PM REEVAL EST PAT 65+ YR: CPT | Mod: S$GLB,,, | Performed by: INTERNAL MEDICINE

## 2025-04-16 PROCEDURE — 99999 PR PBB SHADOW E&M-EST. PATIENT-LVL V: CPT | Mod: PBBFAC,,, | Performed by: INTERNAL MEDICINE

## 2025-04-16 RX ORDER — CITALOPRAM 40 MG/1
40 TABLET, FILM COATED ORAL DAILY
Qty: 90 TABLET | Refills: 3 | Status: SHIPPED | OUTPATIENT
Start: 2025-04-16

## 2025-04-16 RX ORDER — SIMVASTATIN 40 MG/1
40 TABLET, FILM COATED ORAL DAILY
Qty: 90 TABLET | Refills: 3 | Status: SHIPPED | OUTPATIENT
Start: 2025-04-16

## 2025-04-16 RX ORDER — IBUPROFEN 100 MG/5ML
1000 SUSPENSION, ORAL (FINAL DOSE FORM) ORAL 2 TIMES DAILY
COMMUNITY

## 2025-04-16 NOTE — PROGRESS NOTES
"Subjective:       Patient ID: Marielena Tracy is a 64 y.o. female.    Chief Complaint: Annual Exam    HPI: Patient seen for annual exam.  She has not seen me in person in several years and her last few visits were virtual.  She is no longer on lorazepam and in general he is doing okay.  She weaned off.  She thinks since retiring her work stress is a lot less.  And therefore maybe is not missing the med.  She is active with family friends.  We will need to watch weight and blood pressure as it was borderline initially.    Denies any chest pain shortness a breath fevers or chills.  Doing well after recovering from kneecap fracture.      Review of Systems   Constitutional:  Negative for appetite change, chills and fever.   HENT:  Negative for nosebleeds and sore throat.    Eyes:  Negative for pain and visual disturbance.   Respiratory:  Negative for cough, shortness of breath and wheezing.    Cardiovascular:  Negative for chest pain and leg swelling.   Gastrointestinal:  Negative for abdominal pain, constipation and diarrhea.   Endocrine: Negative for polyuria.   Genitourinary:  Negative for difficulty urinating, hematuria and vaginal bleeding.   Musculoskeletal:  Negative for arthralgias, back pain, gait problem and neck pain.   Integumentary:  Negative for pallor and rash.   Neurological:  Negative for tremors, seizures and headaches.   Hematological:  Does not bruise/bleed easily.   Psychiatric/Behavioral:  Negative for dysphoric mood. The patient is not nervous/anxious.            Past Medical History:   Diagnosis Date    Anxiety and depression 2014    Colon polyps     Glaucoma     Hyperlipidemia     Hypertension     pt states " i don't have high blood pressure"    Keloid cicatrix     Obesity (BMI 30.0-34.9)      Past Surgical History:   Procedure Laterality Date     SECTION      x2    COLONOSCOPY N/A 2016    Procedure: COLONOSCOPY;  Surgeon: SHANNON Nj MD;  Location: [facility] ([facility] " FLR);  Service: Endoscopy;  Laterality: N/A;    KNEE SURGERY Left 09/24/2019    PATELLAR TENDON REPAIR Left 9/24/2019    Procedure: REPAIR/RECONSTRUCTION TENDON, PATELLAR;  Surgeon: Danielle Cloud MD;  Location: [facility];  Service: Orthopedics;  Laterality: Left;      Problem List[1]     Objective:      Physical Exam  Constitutional:       General: She is not in acute distress.     Appearance: She is well-developed.   HENT:      Head: Normocephalic and atraumatic.      Right Ear: Tympanic membrane, ear canal and external ear normal.      Left Ear: Tympanic membrane, ear canal and external ear normal.      Mouth/Throat:      Pharynx: No oropharyngeal exudate or posterior oropharyngeal erythema.   Eyes:      General: No scleral icterus.     Conjunctiva/sclera: Conjunctivae normal.      Pupils: Pupils are equal, round, and reactive to light.   Neck:      Thyroid: No thyromegaly.   Cardiovascular:      Rate and Rhythm: Normal rate and regular rhythm.      Pulses: Normal pulses.      Heart sounds: No murmur heard.  Pulmonary:      Effort: Pulmonary effort is normal.      Breath sounds: Normal breath sounds. No wheezing.   Abdominal:      General: Bowel sounds are normal. There is no distension.      Palpations: Abdomen is soft.      Tenderness: There is no abdominal tenderness.   Musculoskeletal:         General: No tenderness.      Cervical back: Normal range of motion and neck supple.      Right lower leg: No edema.      Left lower leg: No edema.   Lymphadenopathy:      Cervical: No cervical adenopathy.   Skin:     Coloration: Skin is not jaundiced.      Findings: No rash.   Neurological:      General: No focal deficit present.      Mental Status: She is alert and oriented to person, place, and time.   Psychiatric:         Mood and Affect: Mood normal.         Behavior: Behavior normal.         Assessment:       Problem List Items Addressed This Visit          Cardiac/Vascular    Hyperlipidemia    Relevant Medications     "simvastatin (ZOCOR) 40 MG tablet    Other Relevant Orders    Lipid Panel    TSH    Comprehensive Metabolic Panel     Other Visit Diagnoses         Routine physical examination    -  Primary    Relevant Orders    Lipid Panel    TSH    Hemoglobin A1C    Comprehensive Metabolic Panel      Encounter for screening mammogram for malignant neoplasm of breast        Relevant Orders    Mammo Digital Screening Bilat w/ Gordy (XPD)      Colon cancer screening        Relevant Orders    Ambulatory referral/consult to Endo Procedure       Anxiety        Relevant Medications    citalopram (CELEXA) 40 MG tablet      Elevated glucose level        Relevant Orders    Hemoglobin A1C            Plan:         Marielena was seen today for annual exam.    Diagnoses and all orders for this visit:    Routine physical examination  -     Lipid Panel; Future  -     TSH; Future  -     Hemoglobin A1C; Future  -     Comprehensive Metabolic Panel; Future    Encounter for screening mammogram for malignant neoplasm of breast  -     Mammo Digital Screening Bilat w/ Gordy (XPD); Future    Colon cancer screening  -     Ambulatory referral/consult to Endo Procedure ; Future    Anxiety  -     citalopram (CELEXA) 40 MG tablet; Take 1 tablet (40 mg total) by mouth once daily.    Hyperlipidemia, unspecified hyperlipidemia type  -     simvastatin (ZOCOR) 40 MG tablet; Take 1 tablet (40 mg total) by mouth once daily.  -     Lipid Panel; Future  -     TSH; Future  -     Comprehensive Metabolic Panel; Future    Elevated glucose level  -     Hemoglobin A1C; Future           Review labs.  Continue meds.  Follow-up 6-12 months          Portions of this note may have been created with voice recognition software. Occasional "wrong-word" or "sound-a-like" substitutions may have occurred due to the inherent limitations of voice recognition software. Please, read the note carefully and recognize, using context, where substitutions have occurred.       [1] "   Patient Active Problem List  Diagnosis    Hyperlipidemia    Glaucoma    HTN (hypertension)    Anxiety and depression    Screen for colon cancer    Displaced transverse fracture of left patella, initial encounter for closed fracture    Closed displaced transverse fracture of left patella    Range of motion deficit    Weakness    Patellar tendon rupture, left, sequela    Left knee pain

## 2025-04-17 ENCOUNTER — HOSPITAL ENCOUNTER (OUTPATIENT)
Dept: RADIOLOGY | Facility: HOSPITAL | Age: 65
Discharge: HOME | End: 2025-04-17
Attending: INTERNAL MEDICINE
Payer: MEDICARE

## 2025-04-17 DIAGNOSIS — Z12.31 ENCOUNTER FOR SCREENING MAMMOGRAM FOR MALIGNANT NEOPLASM OF BREAST: ICD-10-CM

## 2025-04-17 PROCEDURE — 77067 SCR MAMMO BI INCL CAD: CPT | Mod: TC

## 2025-04-22 ENCOUNTER — PATIENT MESSAGE (OUTPATIENT)
Dept: INTERNAL MEDICINE | Facility: CLINIC | Age: 65
End: 2025-04-22
Payer: MEDICARE

## 2025-04-29 ENCOUNTER — PATIENT MESSAGE (OUTPATIENT)
Dept: INTERNAL MEDICINE | Facility: CLINIC | Age: 65
End: 2025-04-29
Payer: MEDICARE

## 2025-05-01 DIAGNOSIS — Z78.0 MENOPAUSE: ICD-10-CM

## 2025-05-06 ENCOUNTER — TELEPHONE (OUTPATIENT)
Dept: ENDOSCOPY | Facility: HOSPITAL | Age: 65
End: 2025-05-06

## 2025-05-06 ENCOUNTER — CLINICAL SUPPORT (OUTPATIENT)
Dept: ENDOSCOPY | Facility: HOSPITAL | Age: 65
End: 2025-05-06
Attending: INTERNAL MEDICINE
Payer: MEDICARE

## 2025-05-06 VITALS — BODY MASS INDEX: 27.31 KG/M2 | HEIGHT: 64 IN | WEIGHT: 160 LBS

## 2025-05-06 DIAGNOSIS — Z12.11 SPECIAL SCREENING FOR MALIGNANT NEOPLASMS, COLON: Primary | ICD-10-CM

## 2025-05-06 DIAGNOSIS — Z12.11 COLON CANCER SCREENING: ICD-10-CM

## 2025-05-06 NOTE — TELEPHONE ENCOUNTER
Referral for procedure from PAT appointment      Spoke to patient to schedule procedure(s) Colonoscopy       Physician to perform procedure(s) Dr. Heaton  Date of Procedure (s) 6/4/25  Arrival Time 9:15 AM  Time of Procedure(s) 10:15 AM   Location of Procedure(s) [facility] [address] [facility]  Type of Rx Prep sent to patient: PEG  Instructions provided to patient via [facility]    Patient was informed on the following information and verbalized understanding. Screening questionnaire reviewed with patient and complete. If procedure requires anesthesia, a responsible adult needs to be present to accompany the patient home, patient cannot drive after receiving anesthesia. Appointment details are tentative, especially check-in time. Patient will receive a prep-op call 7 days prior to confirm check-in time for procedure. If applicable the patient should contact their pharmacy to verify Rx for procedure prep is ready for pick-up. Patient was advised to call the scheduling department at [phone] if pharmacy states no Rx is available. Patient was advised to call the endoscopy scheduling department if any questions or concerns arise.      [facility] Scheduling Department

## 2025-05-09 ENCOUNTER — HOSPITAL ENCOUNTER (OUTPATIENT)
Dept: RADIOLOGY | Facility: HOSPITAL | Age: 65
Discharge: HOME | End: 2025-05-09
Attending: INTERNAL MEDICINE
Payer: MEDICARE

## 2025-05-09 DIAGNOSIS — Z78.0 MENOPAUSE: ICD-10-CM

## 2025-05-09 PROCEDURE — 77080 DXA BONE DENSITY AXIAL: CPT | Mod: TC

## 2025-05-09 PROCEDURE — 77080 DXA BONE DENSITY AXIAL: CPT | Mod: 26,,, | Performed by: INTERNAL MEDICINE

## 2025-05-28 ENCOUNTER — PATIENT MESSAGE (OUTPATIENT)
Dept: INTERNAL MEDICINE | Facility: CLINIC | Age: 65
End: 2025-05-28
Payer: MEDICARE

## 2025-05-28 DIAGNOSIS — R93.7 ABNORMAL X-RAY OF LUMBAR SPINE: Primary | ICD-10-CM

## 2025-05-30 ENCOUNTER — HOSPITAL ENCOUNTER (OUTPATIENT)
Dept: RADIOLOGY | Facility: HOSPITAL | Age: 65
Discharge: HOME | End: 2025-05-30
Attending: INTERNAL MEDICINE
Payer: MEDICARE

## 2025-05-30 DIAGNOSIS — R93.7 ABNORMAL X-RAY OF LUMBAR SPINE: ICD-10-CM

## 2025-05-30 PROCEDURE — 72100 X-RAY EXAM L-S SPINE 2/3 VWS: CPT | Mod: 26,,, | Performed by: RADIOLOGY

## 2025-05-30 PROCEDURE — 72100 X-RAY EXAM L-S SPINE 2/3 VWS: CPT | Mod: TC

## 2025-06-04 ENCOUNTER — ANESTHESIA EVENT (OUTPATIENT)
Dept: ENDOSCOPY | Facility: HOSPITAL | Age: 65
End: 2025-06-04
Payer: MEDICARE

## 2025-06-04 ENCOUNTER — ANESTHESIA (OUTPATIENT)
Dept: ENDOSCOPY | Facility: HOSPITAL | Age: 65
End: 2025-06-04
Payer: MEDICARE

## 2025-06-04 ENCOUNTER — HOSPITAL ENCOUNTER (OUTPATIENT)
Facility: HOSPITAL | Age: 65
Discharge: HOME | End: 2025-06-04
Attending: COLON & RECTAL SURGERY | Admitting: COLON & RECTAL SURGERY
Payer: MEDICARE

## 2025-06-04 VITALS
TEMPERATURE: 98 F | DIASTOLIC BLOOD PRESSURE: 79 MMHG | HEART RATE: 45 BPM | SYSTOLIC BLOOD PRESSURE: 180 MMHG | RESPIRATION RATE: 14 BRPM | WEIGHT: 159.19 LBS | OXYGEN SATURATION: 100 % | HEIGHT: 64 IN | BODY MASS INDEX: 27.18 KG/M2

## 2025-06-04 DIAGNOSIS — Z12.11 COLON CANCER SCREENING: Primary | ICD-10-CM

## 2025-06-04 DIAGNOSIS — Z12.11 SCREENING FOR COLON CANCER: ICD-10-CM

## 2025-06-04 PROCEDURE — 63600175 PHARM REV CODE 636 W HCPCS: Performed by: NURSE ANESTHETIST, CERTIFIED REGISTERED

## 2025-06-04 PROCEDURE — 25000003 PHARM REV CODE 250: Performed by: COLON & RECTAL SURGERY

## 2025-06-04 PROCEDURE — 45380 COLONOSCOPY AND BIOPSY: CPT | Mod: PT | Performed by: COLON & RECTAL SURGERY

## 2025-06-04 PROCEDURE — 37000008 HC ANESTHESIA 1ST 15 MINUTES: Performed by: COLON & RECTAL SURGERY

## 2025-06-04 PROCEDURE — 27201012 HC FORCEPS, HOT/COLD, DISP: Performed by: COLON & RECTAL SURGERY

## 2025-06-04 PROCEDURE — 45380 COLONOSCOPY AND BIOPSY: CPT | Mod: PT,,, | Performed by: COLON & RECTAL SURGERY

## 2025-06-04 PROCEDURE — 37000009 HC ANESTHESIA EA ADD 15 MINS: Performed by: COLON & RECTAL SURGERY

## 2025-06-04 PROCEDURE — 88305 TISSUE EXAM BY PATHOLOGIST: CPT | Mod: 26,,, | Performed by: PATHOLOGY

## 2025-06-04 PROCEDURE — 88305 TISSUE EXAM BY PATHOLOGIST: CPT | Mod: TC | Performed by: COLON & RECTAL SURGERY

## 2025-06-04 RX ORDER — LIDOCAINE HYDROCHLORIDE 20 MG/ML
INJECTION INTRAVENOUS
Status: DISCONTINUED | OUTPATIENT
Start: 2025-06-04 | End: 2025-06-04

## 2025-06-04 RX ORDER — PROPOFOL 10 MG/ML
VIAL (ML) INTRAVENOUS
Status: DISCONTINUED | OUTPATIENT
Start: 2025-06-04 | End: 2025-06-04

## 2025-06-04 RX ORDER — SODIUM CHLORIDE 9 MG/ML
INJECTION, SOLUTION INTRAVENOUS CONTINUOUS
Status: DISCONTINUED | OUTPATIENT
Start: 2025-06-04 | End: 2025-06-04 | Stop reason: HOSPADM

## 2025-06-04 RX ORDER — GLUCAGON 1 MG
1 KIT INJECTION
OUTPATIENT
Start: 2025-06-04

## 2025-06-04 RX ORDER — ONDANSETRON HYDROCHLORIDE 2 MG/ML
4 INJECTION, SOLUTION INTRAVENOUS DAILY PRN
OUTPATIENT
Start: 2025-06-04

## 2025-06-04 RX ORDER — SODIUM CHLORIDE 0.9 % (FLUSH) 0.9 %
10 SYRINGE (ML) INJECTION
OUTPATIENT
Start: 2025-06-04

## 2025-06-04 RX ADMIN — SODIUM CHLORIDE: 0.9 INJECTION, SOLUTION INTRAVENOUS at 10:06

## 2025-06-04 RX ADMIN — LIDOCAINE HYDROCHLORIDE 50 MG: 20 INJECTION INTRAVENOUS at 10:06

## 2025-06-04 RX ADMIN — PROPOFOL 70 MG: 10 INJECTION, EMULSION INTRAVENOUS at 10:06

## 2025-06-04 RX ADMIN — PROPOFOL 200 MCG/KG/MIN: 10 INJECTION, EMULSION INTRAVENOUS at 10:06

## 2025-06-04 NOTE — PR[employer]ATION PATIENT INSTRUCTIONS
Discharge Summary/Instructions after an Endoscopic Procedure  Patient Name: Marielena Tracy  Patient MRN: [id number]  Patient YOB: 1960  Wednesday, June 4, 2025  Dwayne Heaton MD  Dear patient,  As a result of recent federal legislation (The Federal Cures Act), you may   receive lab or pathology results from your procedure in your [facility]   account before your physician is able to contact you. Your physician or   their representative will relay the results to you with their   recommendations at their soonest availability.  Thank you,  RESTRICTIONS:  During your procedure today, you received medications for sedation.  These   medications may affect your judgment, balance and coordination.  Therefore,   for 24 hours, you have the following restrictions:   - DO NOT drive a car, operate machinery, make legal/financial decisions,   sign important papers or drink alcohol.    ACTIVITY:  Today: no heavy lifting, straining or running due to procedural   sedation/anesthesia.  The following day: return to full activity including work.  DIET:  Eat and drink normally unless instructed otherwise.     TREATMENT FOR COMMON SIDE EFFECTS:  - Mild abdominal pain, nausea, belching, bloating or excessive gas:  rest,   eat lightly and use a heating pad.  - Sore Throat: treat with throat lozenges and/or gargle with warm salt   water.  - Because air was used during the procedure, expelling large amounts of air   from your rectum or belching is normal.  - If a bowel prep was taken, you may not have a bowel movement for 1-3 days.    This is normal.  SYMPTOMS TO WATCH FOR AND REPORT TO YOUR PHYSICIAN:  1. Abdominal pain or bloating, other than gas cramps.  2. Chest pain.  3. Back pain.  4. Signs of infection such as: chills or fever occurring within 24 hours   after the procedure.  5. Rectal bleeding, which would show as bright red, maroon, or black stools.   (A tablespoon of blood from the rectum is not serious, especially  if   hemorrhoids are present.)  6. Vomiting.  7. Weakness or dizziness.  GO DIRECTLY TO THE NEAREST EMERGENCY ROOM IF YOU HAVE ANY OF THE FOLLOWING:      Difficulty breathing              Chills and/or fever over 101 F   Persistent vomiting and/or vomiting blood   Severe abdominal pain   Severe chest pain   Black, tarry stools   Bleeding- more than one tablespoon   Any other symptom or condition that you feel may need urgent attention  Your doctor recommends these additional instructions:  If any biopsies were taken, your doctors clinic will contact you in 1 to 2   weeks with any results.  - Discharge patient to home (ambulatory).   - Patient has a contact number available for emergencies.  The signs and   symptoms of potential delayed complications were discussed with the   patient.  Return to normal activities tomorrow.  Written discharge   instructions were provided to the patient.   - Resume previous diet.   - Continue present medications.   - Return to primary care physician as previously scheduled.   - Repeat colonoscopy in 7-10 years for surveillance based on pathology   results.  For questions, problems or results please call your physician - Dwayne Heaton MD at Work:  [phone].  [facility], EMERGENCY ROOM PHONE NUMBER: [phone]  IF A COMPLICATION OR EMERGENCY SITUATION ARISES AND YOU ARE UNABLE TO REACH   YOUR PHYSICIAN - GO DIRECTLY TO THE EMERGENCY ROOM.  MD Dwayne Zendejas MD  6/4/2025 10:50:43 AM  This report has been verified and signed electronically.  Dear patient,  As a result of recent federal legislation (The Federal Cures Act), you may   receive lab or pathology results from your procedure in your [facility]   account before your physician is able to contact you. Your physician or   their representative will relay the results to you with their   recommendations at their soonest availability.  Thank you,  PROVATION

## 2025-06-04 NOTE — H&P
"COLONOSCOPY HISTORY AND PHYSICAL EXAM    Procedure : Colonoscopy      INDICATIONS: asymptomatic screening exam and personal history of colon polyps    Family Hx of CRC: none    Last Colonoscopy:  2016  Findings: t ubular adenoma       Past Medical History:   Diagnosis Date    Anxiety and depression 1/29/2014    Colon polyps     Glaucoma     Hyperlipidemia     Hypertension     pt states " i don't have high blood pressure"    Keloid cicatrix     Obesity (BMI 30.0-34.9)      Sedation Problems: NO  Family History   Problem Relation Name Age of Onset    Dementia Father      Breast cancer Sister      Stroke Brother      Heart disease Brother      Cancer Brother      Cancer Paternal Grandmother      Glaucoma Maternal Uncle      Colon cancer Neg Hx      Ovarian cancer Neg Hx      Blindness Neg Hx      Macular degeneration Neg Hx      Retinal detachment Neg Hx       Fam Hx of Sedation Problems: NO  Social History[1]    Review of Systems - Negative except   Respiratory ROS: no dyspnea  Cardiovascular ROS: no exertional chest pain  Gastrointestinal ROS: NO abdominal discomfort,  NO rectal bleeding  Musculoskeletal ROS: no muscular pain  Neurological ROS: no recent stroke    Physical Exam:  BP (!) 162/76 (BP Location: Left arm, Patient Position: Lying)   Pulse 75   Temp 98.2 °F (36.8 °C) (Temporal)   Resp 16   Ht 5' 4" (1.626 m)   Wt 72.2 kg (159 lb 2.8 oz)   SpO2 98%   Breastfeeding No   BMI 27.32 kg/m²   General: no distress  Head: normocephalic  Mallampati Score   Neck: supple, symmetrical, trachea midline  Lungs:  clear to auscultation bilaterally and normal respiratory effort  Heart: regular rate and rhythm and no murmur  Abdomen: soft, non-tender non-distented; bowel sounds normal; no masses,  no organomegaly  Extremities: no cyanosis or edema, or clubbing    PLAN  COLONOSCOPY.    SedationPlan :MAC    The details of the procedure, the possible need for biopsy or polypectomy and the potential risks including " bleeding, perforation, missed polyps were discussed in detail.          [1]   Social History  Socioeconomic History    Marital status:    Occupational History    Occupation: Manager     Employer: [employer]   Tobacco Use    Smoking status: Former     Passive exposure: Never    Smokeless tobacco: Never   Substance and Sexual Activity    Alcohol use: Yes     Comment: few times a week     Drug use: Yes     Types: Marijuana     Comment: occasional    Sexual activity: Yes     Partners: Male     Birth control/protection: Post-menopausal   Other Topics Concern    Are you pregnant or think you may be? No    Breast-feeding No     Social Drivers of Health     Financial Resource Strain: Low Risk  (4/15/2025)    Overall Financial Resource Strain (CARDIA)     Difficulty of Paying Living Expenses: Not hard at all   Food Insecurity: No Food Insecurity (4/15/2025)    Hunger Vital Sign     Worried About Running Out of Food in the Last Year: Never true     Ran Out of Food in the Last Year: Never true   Transportation Needs: No Transportation Needs (4/15/2025)    PRAPARE - Transportation     Lack of Transportation (Medical): No     Lack of Transportation (Non-Medical): No   Physical Activity: Insufficiently Active (4/15/2025)    Exercise Vital Sign     Days of Exercise per Week: 3 days     Minutes of Exercise per Session: 30 min   Stress: Stress Concern Present (4/15/2025)    [employer] - Occupational Stress Questionnaire     Feeling of Stress : To some extent   Housing Stability: Low Risk  (4/15/2025)    Housing Stability Vital Sign     Unable to Pay for Housing in the Last Year: No     Homeless in the Last Year: No

## 2025-06-05 LAB
ESTROGEN SERPL-MCNC: NORMAL PG/ML
INSULIN SERPL-ACNC: NORMAL U[IU]/ML
LAB AP CLINICAL INFORMATION: NORMAL
LAB AP GROSS DESCRIPTION: NORMAL
LAB AP PERFORMING LOCATION(S): NORMAL
LAB AP REPORT FOOTNOTES: NORMAL

## 2025-06-08 ENCOUNTER — PATIENT MESSAGE (OUTPATIENT)
Dept: INTERNAL MEDICINE | Facility: CLINIC | Age: 65
End: 2025-06-08
Payer: MEDICARE

## 2025-06-21 DIAGNOSIS — G47.00 INSOMNIA, UNSPECIFIED TYPE: ICD-10-CM

## 2025-06-23 RX ORDER — HYDROXYZINE HYDROCHLORIDE 50 MG/1
TABLET, FILM COATED ORAL
Qty: 90 TABLET | Refills: 4 | Status: SHIPPED | OUTPATIENT
Start: 2025-06-23

## 2025-06-23 NOTE — TELEPHONE ENCOUNTER
Refill Routing Note   Medication(s) are not appropriate for processing by [facility] for the following reason(s):        Outside of protocol    [facility] action(s):  Route               Appointments  past 12m or future 3m with PCP    Date Provider   Last Visit   4/16/2025 Delroy Gregory MD   Next Visit   Visit date not found Delroy Gregory MD   ED visits in past 90 days: 0        Note composed:6:29 AM 06/23/2025

## (undated) DEVICE — BIT DRILL 2.7.

## (undated) DEVICE — Device

## (undated) DEVICE — SEE MEDLINE ITEM 152523

## (undated) DEVICE — SUT VICRYL PLUS 0 CT1 18IN

## (undated) DEVICE — SUT MONOCRYL PLUS UD 3-0 27

## (undated) DEVICE — ADHESIVE MASTISOL VIAL 48/BX

## (undated) DEVICE — PAD CAST SPECIALIST STRL 6

## (undated) DEVICE — DRAPE PLASTIC U 60X72

## (undated) DEVICE — SEE MEDLINE ITEM 157117

## (undated) DEVICE — SOL IRR NACL .9% 3000ML

## (undated) DEVICE — SUT VICRYL PLUS 3-0 SH 18IN

## (undated) DEVICE — SEE MEDLINE ITEM 152530

## (undated) DEVICE — TOURNIQUET SB QC DP 34X4IN

## (undated) DEVICE — SEE MEDLINE ITEM 146231

## (undated) DEVICE — ORTHOCORD W/OS-6

## (undated) DEVICE — UNDERGLOVES BIOGEL PI SIZE 7.5

## (undated) DEVICE — GAUZE SPONGE 4X4 12PLY

## (undated) DEVICE — PAD COLD THERAPY KNEE WRAP ON

## (undated) DEVICE — GLOVE ORTHO PF SZ 8.5

## (undated) DEVICE — GLOVE BIOGEL SKINSENSE PI 7.0

## (undated) DEVICE — DRAPE STERI U-SHAPED 47X51IN

## (undated) DEVICE — GLOVE SURGEON SYN PF SZ 9

## (undated) DEVICE — APPLICATOR CHLORAPREP ORN 26ML

## (undated) DEVICE — SUT VICRYL+ 1 CT1 18IN

## (undated) DEVICE — CLOSURE SKIN STERI STRIP 1/2X4

## (undated) DEVICE — PAD ABD 8X10 STERILE

## (undated) DEVICE — PAD ELECTRODE STER 1.5X3

## (undated) DEVICE — SOL 9P NACL IRR PIC IL

## (undated) DEVICE — RETRIEVER SUTURE HEWSON DISP

## (undated) DEVICE — SPONGE LAP 18X18 PREWASHED

## (undated) DEVICE — SEE MEDLINE ITEM 157131

## (undated) DEVICE — SEE MEDLINE ITEM 146298

## (undated) DEVICE — GLOVE BIOGEL SKINSENSE PI 6.5

## (undated) DEVICE — SEE MEDLINE ITEM 157216

## (undated) DEVICE — ELECTRODE REM PLYHSV RETURN 9

## (undated) DEVICE — SUT 38 FIBERWIRE #2

## (undated) DEVICE — DRESSING XEROFORM FOIL PK 1X8

## (undated) DEVICE — BRACE KNEE T SCOPE PREMIER

## (undated) DEVICE — SEE MEDLINE ITEM 157150